# Patient Record
Sex: FEMALE | Race: OTHER | Employment: UNEMPLOYED | URBAN - METROPOLITAN AREA
[De-identification: names, ages, dates, MRNs, and addresses within clinical notes are randomized per-mention and may not be internally consistent; named-entity substitution may affect disease eponyms.]

---

## 2020-06-20 ENCOUNTER — APPOINTMENT (OUTPATIENT)
Dept: CT IMAGING | Facility: HOSPITAL | Age: 50
DRG: 552 | End: 2020-06-20
Attending: EMERGENCY MEDICINE
Payer: COMMERCIAL

## 2020-06-20 ENCOUNTER — HOSPITAL ENCOUNTER (INPATIENT)
Facility: HOSPITAL | Age: 50
LOS: 2 days | Discharge: HOME | DRG: 552 | End: 2020-06-23
Attending: EMERGENCY MEDICINE | Admitting: COLON & RECTAL SURGERY
Payer: COMMERCIAL

## 2020-06-20 DIAGNOSIS — S32.009A CLOSED FRACTURE OF TRANSVERSE PROCESS OF LUMBAR VERTEBRA, INITIAL ENCOUNTER (HCC): ICD-10-CM

## 2020-06-20 DIAGNOSIS — T14.90XA BLUNT TRAUMA: ICD-10-CM

## 2020-06-20 DIAGNOSIS — S12.600A CLOSED DISPLACED FRACTURE OF SEVENTH CERVICAL VERTEBRA, UNSPECIFIED FRACTURE MORPHOLOGY, INITIAL ENCOUNTER (HCC): ICD-10-CM

## 2020-06-20 DIAGNOSIS — S30.1XXA ABDOMINAL HEMATOMA: ICD-10-CM

## 2020-06-20 DIAGNOSIS — S12.500A CLOSED DISPLACED FRACTURE OF SIXTH CERVICAL VERTEBRA, UNSPECIFIED FRACTURE MORPHOLOGY, INITIAL ENCOUNTER (HCC): Primary | ICD-10-CM

## 2020-06-20 PROCEDURE — 99253 IP/OBS CNSLTJ NEW/EST LOW 45: CPT | Performed by: INTERNAL MEDICINE

## 2020-06-20 PROCEDURE — 70450 CT HEAD/BRAIN W/O DYE: CPT | Performed by: EMERGENCY MEDICINE

## 2020-06-20 PROCEDURE — 70498 CT ANGIOGRAPHY NECK: CPT | Performed by: EMERGENCY MEDICINE

## 2020-06-20 PROCEDURE — 74177 CT ABD & PELVIS W/CONTRAST: CPT | Performed by: EMERGENCY MEDICINE

## 2020-06-20 PROCEDURE — 72125 CT NECK SPINE W/O DYE: CPT | Performed by: EMERGENCY MEDICINE

## 2020-06-20 PROCEDURE — 99223 1ST HOSP IP/OBS HIGH 75: CPT | Performed by: COLON & RECTAL SURGERY

## 2020-06-20 PROCEDURE — 71260 CT THORAX DX C+: CPT | Performed by: EMERGENCY MEDICINE

## 2020-06-20 RX ORDER — ONDANSETRON 2 MG/ML
4 INJECTION INTRAMUSCULAR; INTRAVENOUS ONCE
Status: COMPLETED | OUTPATIENT
Start: 2020-06-20 | End: 2020-06-20

## 2020-06-20 RX ORDER — SODIUM CHLORIDE 9 MG/ML
125 INJECTION, SOLUTION INTRAVENOUS CONTINUOUS
Status: DISCONTINUED | OUTPATIENT
Start: 2020-06-20 | End: 2020-06-21

## 2020-06-20 RX ORDER — LISINOPRIL 10 MG/1
10 TABLET ORAL DAILY
COMMUNITY

## 2020-06-20 RX ORDER — MORPHINE SULFATE 4 MG/ML
INJECTION, SOLUTION INTRAMUSCULAR; INTRAVENOUS
Status: COMPLETED
Start: 2020-06-20 | End: 2020-06-20

## 2020-06-20 RX ORDER — ONDANSETRON 2 MG/ML
INJECTION INTRAMUSCULAR; INTRAVENOUS
Status: COMPLETED
Start: 2020-06-20 | End: 2020-06-20

## 2020-06-20 RX ORDER — MORPHINE SULFATE 4 MG/ML
4 INJECTION, SOLUTION INTRAMUSCULAR; INTRAVENOUS ONCE
Status: COMPLETED | OUTPATIENT
Start: 2020-06-20 | End: 2020-06-20
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
## 2020-06-21 PROBLEM — S12.500A: Status: ACTIVE | Noted: 2020-06-21

## 2020-06-21 PROBLEM — I87.1 MAY-THURNER SYNDROME: Chronic | Status: ACTIVE | Noted: 2020-06-21

## 2020-06-21 PROBLEM — S32.009A CLOSED FRACTURE OF TRANSVERSE PROCESS OF LUMBAR VERTEBRA, INITIAL ENCOUNTER (HCC): Status: ACTIVE | Noted: 2020-06-21

## 2020-06-21 PROBLEM — S30.1XXA ABDOMINAL HEMATOMA: Status: ACTIVE | Noted: 2020-06-21

## 2020-06-21 PROBLEM — T14.90XA BLUNT TRAUMA: Status: ACTIVE | Noted: 2020-06-21

## 2020-06-21 PROBLEM — I10 ESSENTIAL HYPERTENSION: Status: ACTIVE | Noted: 2020-06-21

## 2020-06-21 PROBLEM — S12.600A: Status: ACTIVE | Noted: 2020-06-21

## 2020-06-21 PROCEDURE — 99232 SBSQ HOSP IP/OBS MODERATE 35: CPT | Performed by: COLON & RECTAL SURGERY

## 2020-06-21 PROCEDURE — 99253 IP/OBS CNSLTJ NEW/EST LOW 45: CPT | Performed by: PHYSICIAN ASSISTANT

## 2020-06-21 PROCEDURE — 99232 SBSQ HOSP IP/OBS MODERATE 35: CPT | Performed by: HOSPITALIST

## 2020-06-21 RX ORDER — OXYCODONE HYDROCHLORIDE 5 MG/1
5 TABLET ORAL EVERY 6 HOURS PRN
Qty: 10 TABLET | Refills: 0 | OUTPATIENT
Start: 2020-06-21

## 2020-06-21 RX ORDER — DEXTROSE AND SODIUM CHLORIDE 5; .45 G/100ML; G/100ML
INJECTION, SOLUTION INTRAVENOUS CONTINUOUS
Status: ACTIVE | OUTPATIENT
Start: 2020-06-21 | End: 2020-06-21

## 2020-06-21 RX ORDER — METOCLOPRAMIDE HYDROCHLORIDE 5 MG/ML
10 INJECTION INTRAMUSCULAR; INTRAVENOUS EVERY 8 HOURS PRN
Status: DISCONTINUED | OUTPATIENT
Start: 2020-06-21 | End: 2020-06-23

## 2020-06-21 RX ORDER — ONDANSETRON 2 MG/ML
4 INJECTION INTRAMUSCULAR; INTRAVENOUS EVERY 6 HOURS PRN
Status: DISCONTINUED | OUTPATIENT
Start: 2020-06-21 | End: 2020-06-23

## 2020-06-21 RX ORDER — ACETAMINOPHEN 500 MG
1000 TABLET ORAL EVERY 6 HOURS PRN
Status: DISCONTINUED | OUTPATIENT
Start: 2020-06-21 | End: 2020-06-23

## 2020-06-21 RX ORDER — HYDROMORPHONE HYDROCHLORIDE 1 MG/ML
0.4 INJECTION, SOLUTION INTRAMUSCULAR; INTRAVENOUS; SUBCUTANEOUS EVERY 2 HOUR PRN
Status: DISCONTINUED | OUTPATIENT
Start: 2020-06-21 | End: 2020-06-23

## 2020-06-21 RX ORDER — HYDROCODONE BITARTRATE AND ACETAMINOPHEN 5; 325 MG/1; MG/1
1 TABLET ORAL EVERY 4 HOURS PRN
Status: DISCONTINUED | OUTPATIENT
Start: 2020-06-21 | End: 2020-06-22

## 2020-06-21 RX ORDER — ONDANSETRON 2 MG/ML
4 INJECTION INTRAMUSCULAR; INTRAVENOUS EVERY 4 HOURS PRN
Status: DISCONTINUED | OUTPATIENT
Start: 2020-06-21 | End: 2020-06-23

## 2020-06-21 RX ORDER — LISINOPRIL 10 MG/1
10 TABLET ORAL DAILY
Status: DISCONTINUED | OUTPATIENT
Start: 2020-06-21 | End: 2020-06-23

## 2020-06-21 RX ORDER — NALOXONE HYDROCHLORIDE 4 MG/.1ML
4 SPRAY, METERED NASAL AS NEEDED
Qty: 1 KIT | Refills: 0 | OUTPATIENT
Start: 2020-06-21

## 2020-06-21 RX ORDER — SODIUM CHLORIDE 9 MG/ML
INJECTION, SOLUTION INTRAVENOUS CONTINUOUS
Status: DISCONTINUED | OUTPATIENT
Start: 2020-06-21 | End: 2020-06-23

## 2020-06-21 RX ORDER — HYDROMORPHONE HYDROCHLORIDE 1 MG/ML
0.2 INJECTION, SOLUTION INTRAMUSCULAR; INTRAVENOUS; SUBCUTANEOUS EVERY 2 HOUR PRN
Status: DISCONTINUED | OUTPATIENT
Start: 2020-06-21 | End: 2020-06-23

## 2020-06-21 RX ORDER — HYDROMORPHONE HYDROCHLORIDE 1 MG/ML
0.8 INJECTION, SOLUTION INTRAMUSCULAR; INTRAVENOUS; SUBCUTANEOUS EVERY 2 HOUR PRN
Status: DISCONTINUED | OUTPATIENT
Start: 2020-06-21 | End: 2020-06-23

## 2020-06-21 RX ORDER — MORPHINE SULFATE 4 MG/ML
4 INJECTION, SOLUTION INTRAMUSCULAR; INTRAVENOUS EVERY 30 MIN PRN
Status: ACTIVE | OUTPATIENT
Start: 2020-06-21 | End: 2020-06-21
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
## 2020-06-21 NOTE — PLAN OF CARE
Plan of care explained and updated with patient input. Progressing per plan of care. All information obtained through the use of an . Patient can speak some [place], but primary language is [address]  Patient is alert and oriented to person, place

## 2020-06-21 NOTE — CONSULTS
[facility]  Report of Consultation    Gino Chang Patient Status:  Emergency    3/15/1970 MRN [id number]   Location [address] [facility] Attending Pancho Monaco, [address] [facility] Day # 0 PCP PHYSICIAN NONSTAFF     Reason for Consultation: Family History   Problem Relation Age of Onset   • Hypertension Mother      Social History:   reports that she has never smoked. She has never used smokeless tobacco. She reports that she does not drink alcohol or use drugs.     Allergies:  No Known All ALT 32 06/20/2020    PTT 26.2 06/20/2020    INR 1.05 06/20/2020    PTP 14.0 06/20/2020       Imaging:   CT SPINE CERVICAL (CPT=72125)     COMPARISON:  [facility] , CT, CT CHEST+ABDOMEN+PELVIS(ALL CNTRST ONLY)(CPT=71260/01397), 6/20/2020, 8:36 PM.     Linsey Amezquita line just anterior to the vertebral artery canal of C6. Critical findings discussed with Dr. Maxine Gloria at 2100 hours on 6/20/2020. Dictated by: Shamika Yeh MD on 6/20/2020 at 8:57 PM         CT BRAIN OR HEAD ([id number])     COMPARISON:  None.      SERGIO significant calcification. PLEURA:  No mass or effusion. CHEST WALL:  No mass or axillary adenopathy. AORTA:  No aneurysm or dissection. VASCULATURE:  No visible pulmonary arterial thrombus or attenuation.        ABDOMEN/PELVIS:  LIVER:  No enlarg gutter without active extravasation. 3. No significant injury to the chest.     4. Thrombosed left common iliac vein stent and partial thrombosis of the left superficial femoral vein stent. Patient has a IVC filter.      Critical findings discussed w hemodynamically significant stenosis or dissection. Smaller than the contralateral side which is developmental.     LEFT  SUBCLAVIAN ARTERY:    No hemodynamically significant stenosis or dissection.   COMMON CAROTID:    No hemodynamically significant steno fracture and hematoma in the left paracolic gutter.   Patient understands the risk of increasing DVT and clot while off anticoagulation, also understands the risk of bleeding with the current spinal fracture and hematoma with the anticoagulation and agreeab
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
## 2020-06-21 NOTE — H&P
[facility]  Report of Consultation    Sha King Patient Status:  Emergency    3/15/1970 MRN [id number]   Location [address] [facility] Attending Robert Parnell, [facility] Day # 0 PCP PHYSICIAN NONSTAFF       Chief Complaint:  Neck swallowing. Respiratory: Negative for shortness of breath and wheezing. Cardiovascular: Negative for chest pain and palpitations. Gastrointestinal: Positive for vomiting and abdominal pain. Negative for nausea, diarrhea and constipation.    Endocrin .0       Recent Labs   Lab 06/20/20  2116   *   BUN 11   CREATSERUM 0.93   GFRAA 82   GFRNAA 71   CA 8.7   ALB 3.7      K 3.4*      CO2 25.0   ALKPHO 67   AST 39*   ALT 32   BILT 0.5   TP 7.6         Recent Labs   Lab 06/20/20 pericolic gutter measuring 32 x 13 mm transversely and approximately 5 cm in caudal cranial dimension could represent a small hematoma. There is no active extravasation. No visible mass, obstruction, or bowel wall thickening. Small hiatal hernia.   ABD fluid collections. There is no midline shift. There are no intraparenchymal brain abnormalities. There is nothing specific for acute infarct. There is no hemorrhage or mass lesion. SINUSES:           No sign of acute sinusitis.   Mild mucosal thicken disc/facet abnormality, spinal stenosis, or foraminal stenosis. C4-C5:  No significant disc/facet abnormality, spinal stenosis, or foraminal stenosis. C5-C6:  No significant disc/facet abnormality, spinal stenosis, or foraminal stenosis.   C6-C7:  No sign
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
## 2020-06-21 NOTE — PLAN OF CARE
Patient admitted to 371 via cart from emergency department. Accompanied by transporter and ER nurse. Transferred from cart to bed with slideboard and assistance of 4. Oriented to room and safety protocols. All care explained with the use of a translater.  V

## 2020-06-21 NOTE — PLAN OF CARE
Pt in bed. Has been on bedrest since admission. RA, VSS. Dr. Gerri Poon saw this am, ok/stable to ambulate with PT/OT, [place] collar on at all times. [place]ar[place]uan speaking, family at bedside. Was NPO, non-surgical per neurosurgery, will add regular diet.  Eligio Talbot i
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
## 2020-06-21 NOTE — CONSULTS
[facility]  Neurosurgery Consult    Virgilio Schmitz Patient Status:  Inpatient    3/15/1970 MRN [id number]   [facility] 3SW-A Attending Amanda Cai MD   Hosp Day # 0 PCP PHYSICIAN NONSTAFF     REASON FOR CONSULTATION:  Multiple injection 0.4 mg, 0.4 mg, Intravenous, Q2H PRN    Or  HYDROmorphone HCl (DILAUDID) 1 MG/ML injection 0.8 mg, 0.8 mg, Intravenous, Q2H PRN  ondansetron HCl (ZOFRAN) injection 4 mg, 4 mg, Intravenous, Q6H PRN  Metoclopramide HCl (REGLAN) injection 10 mg, 10 arteries. CT Brain -     No acute intracranial process. CT CAP -    1. Nondisplaced acute right transverse process fractures of L1 and L2.     2. Age indeterminate hematoma within the left pericolic gutter without active extravasation.        3. No si
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
## 2020-06-22 PROCEDURE — 99232 SBSQ HOSP IP/OBS MODERATE 35: CPT | Performed by: HOSPITALIST

## 2020-06-22 RX ORDER — CYCLOBENZAPRINE HCL 10 MG
10 TABLET ORAL 3 TIMES DAILY PRN
Status: DISCONTINUED | OUTPATIENT
Start: 2020-06-22 | End: 2020-06-23

## 2020-06-22 RX ORDER — HYDROCODONE BITARTRATE AND ACETAMINOPHEN 10; 325 MG/1; MG/1
1 TABLET ORAL EVERY 4 HOURS PRN
Status: DISCONTINUED | OUTPATIENT
Start: 2020-06-22 | End: 2020-06-23

## 2020-06-22 RX ORDER — HYDROCODONE BITARTRATE AND ACETAMINOPHEN 10; 325 MG/1; MG/1
2 TABLET ORAL EVERY 4 HOURS PRN
Status: DISCONTINUED | OUTPATIENT
Start: 2020-06-22 | End: 2020-06-23
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
## 2020-06-22 NOTE — PROGRESS NOTES
[facility]  Progress Note    Padminimattheidi Mancini Patient Status:  Inpatient    3/15/1970 MRN [id number]   [facility] 3SW-A Attending Matt Renee MD   Hosp Day # 1 PCP PHYSICIAN NONSTAFF     Subjective:  Patient continues to complain minutes face to face with the patient. More than 50% of that time was spent counseling the patient and/or on coordination of care. The diagnosis, prognosis, and general treatment was explained to the patient and the family.      Gabriela Reyes PA-C  [facility]
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
## 2020-06-22 NOTE — HOME CARE LIAISON
TNL ask supervisor if [facility] would accept this [place] referral on dc. [facility] will not be able to provide services to ptnt on dc. Janet WHITFIELD and Monalisa De Jesus CM  Aware.     Thanks  Kamlesh Cruz
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
## 2020-06-22 NOTE — CM/SW NOTE
06/22/20 1100   CM/SW Referral Data   Referral Source Social Work (self-referral)   Reason for Referral Discharge planning;Psychoscial assessment   Informant Patient; Other  (family member, Bailey Johnson)      Method of Interpretation Family Member ov needs/concerns at this time. / to remain available for support and/or discharge planning.      Triny Klein, [facility]  Discharge Planner  [phone]

## 2020-06-22 NOTE — PLAN OF CARE
Patient is alert and oriented x4, complains of moderate to severe pain to her neck and back, Pain medications were adjusted today. Aspen collar kept on at all time, the abrasion on her neck line was cleaned with NS, bacitracin ointment and gauze applied.
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
## 2020-06-22 NOTE — PROGRESS NOTES
[facility]  Neurosurgery Progress Note    Crystal Smith Patient Status:  Inpatient    3/15/1970 MRN [id number]   [facility] 3SW-A Attending Malka Valdez MD   Hosp Day # 1 PCP PHYSICIAN NONSTAFF     Subjective:  Pt seen and exam [id number] Brenda White for d/c when cleared.      Gilberto Calvo MD  Neurological Surgeon  [facility]  [address], [address], [address] [facility]

## 2020-06-23 VITALS
SYSTOLIC BLOOD PRESSURE: 132 MMHG | OXYGEN SATURATION: 96 % | BODY MASS INDEX: 34.74 KG/M2 | HEART RATE: 74 BPM | RESPIRATION RATE: 18 BRPM | WEIGHT: 216.19 LBS | HEIGHT: 66 IN | TEMPERATURE: 98 F | DIASTOLIC BLOOD PRESSURE: 87 MMHG

## 2020-06-23 PROCEDURE — 99232 SBSQ HOSP IP/OBS MODERATE 35: CPT | Performed by: COLON & RECTAL SURGERY

## 2020-06-23 PROCEDURE — 99232 SBSQ HOSP IP/OBS MODERATE 35: CPT | Performed by: HOSPITALIST

## 2020-06-23 RX ORDER — CYCLOBENZAPRINE HCL 10 MG
10 TABLET ORAL 3 TIMES DAILY PRN
Qty: 90 TABLET | Refills: 0 | Status: SHIPPED | OUTPATIENT
Start: 2020-06-23

## 2020-06-23 RX ORDER — HYDROCODONE BITARTRATE AND ACETAMINOPHEN 10; 325 MG/1; MG/1
1 TABLET ORAL EVERY 4 HOURS PRN
Qty: 30 TABLET | Refills: 0 | Status: SHIPPED | OUTPATIENT
Start: 2020-06-23

## 2020-06-23 RX ORDER — POLYETHYLENE GLYCOL 3350 17 G/17G
17 POWDER, FOR SOLUTION ORAL DAILY PRN
Status: DISCONTINUED | OUTPATIENT
Start: 2020-06-23 | End: 2020-06-23

## 2020-06-23 RX ORDER — DOCUSATE SODIUM 100 MG/1
100 CAPSULE, LIQUID FILLED ORAL 2 TIMES DAILY
Status: DISCONTINUED | OUTPATIENT
Start: 2020-06-23 | End: 2020-06-23
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
## 2020-06-23 NOTE — PROGRESS NOTES
[facility]  Progress Note    Dukenorma Alanis Patient Status:  Inpatient    3/15/1970 MRN [id number]   [facility] 3SW-A Attending Chadd Kaplan MD   Hosp Day # 2 PCP PHYSICIAN NONSTAFF     Subjective:  Patient was able to bathe her 30  minutes face to face with the patient. More than 50% of that time was spent counseling the patient and/or on coordination of care. The diagnosis, prognosis, and general treatment was explained to the patient and the family.      Danis Roper MD  [facility]

## 2020-06-23 NOTE — PLAN OF CARE
RN informed pt that there is a discharge order in place, hospitalist informed pt that she would be d/c'ing tonight as well. Pt states that \"she wants to go to [facility] [place]". SW accompanied RN to room so pt could explain this.  Pt informed that without

## 2020-06-23 NOTE — PLAN OF CARE
D/c paperwork given, family friend instructed on paperwork and where to  scripts. Will d/c home via personal car.
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
## 2020-06-23 NOTE — PLAN OF CARE
Pt AOX4. Has moderate pain on her neck. Aspen collar ON at all times. Pain management plan explained.  was used during assessment. All questions were answered. Slightly anxious d/t pain. Tolerating norco and Flexeril PRN.  Up with min ass
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
## 2020-07-08 ENCOUNTER — OFFICE VISIT (OUTPATIENT)
Dept: SURGERY | Facility: CLINIC | Age: 50
End: 2020-07-08
Payer: COMMERCIAL

## 2020-07-08 ENCOUNTER — TELEPHONE (OUTPATIENT)
Dept: NEUROLOGY | Facility: CLINIC | Age: 50
End: 2020-07-08

## 2020-07-08 DIAGNOSIS — S12.500A CLOSED DISPLACED FRACTURE OF SIXTH CERVICAL VERTEBRA, UNSPECIFIED FRACTURE MORPHOLOGY, INITIAL ENCOUNTER (HCC): ICD-10-CM

## 2020-07-08 DIAGNOSIS — S32.009A CLOSED FRACTURE OF TRANSVERSE PROCESS OF LUMBAR VERTEBRA, INITIAL ENCOUNTER (HCC): ICD-10-CM

## 2020-07-08 DIAGNOSIS — S12.600A CLOSED DISPLACED FRACTURE OF SEVENTH CERVICAL VERTEBRA, UNSPECIFIED FRACTURE MORPHOLOGY, INITIAL ENCOUNTER (HCC): Primary | ICD-10-CM

## 2020-07-08 PROCEDURE — 1111F DSCHRG MED/CURRENT MED MERGE: CPT | Performed by: PHYSICIAN ASSISTANT

## 2020-07-08 PROCEDURE — 99214 OFFICE O/P EST MOD 30 MIN: CPT | Performed by: PHYSICIAN ASSISTANT
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
## 2020-08-05 ENCOUNTER — OFFICE VISIT (OUTPATIENT)
Dept: SURGERY | Facility: CLINIC | Age: 50
End: 2020-08-05
Payer: COMMERCIAL

## 2020-08-05 ENCOUNTER — TELEPHONE (OUTPATIENT)
Dept: SURGERY | Facility: CLINIC | Age: 50
End: 2020-08-05

## 2020-08-05 VITALS — HEART RATE: 72 BPM | SYSTOLIC BLOOD PRESSURE: 128 MMHG | DIASTOLIC BLOOD PRESSURE: 82 MMHG

## 2020-08-05 DIAGNOSIS — S32.009A CLOSED FRACTURE OF TRANSVERSE PROCESS OF LUMBAR VERTEBRA, INITIAL ENCOUNTER (HCC): ICD-10-CM

## 2020-08-05 DIAGNOSIS — M54.81 OCCIPITAL NEURALGIA OF LEFT SIDE: ICD-10-CM

## 2020-08-05 DIAGNOSIS — S12.600A CLOSED DISPLACED FRACTURE OF SEVENTH CERVICAL VERTEBRA, UNSPECIFIED FRACTURE MORPHOLOGY, INITIAL ENCOUNTER (HCC): ICD-10-CM

## 2020-08-05 DIAGNOSIS — S12.500A CLOSED DISPLACED FRACTURE OF SIXTH CERVICAL VERTEBRA, UNSPECIFIED FRACTURE MORPHOLOGY, INITIAL ENCOUNTER (HCC): ICD-10-CM
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
## 2020-08-05 DIAGNOSIS — M54.2 NECK PAIN ON LEFT SIDE: Primary | ICD-10-CM

## 2020-08-05 DIAGNOSIS — M25.512 ACUTE PAIN OF LEFT SHOULDER: ICD-10-CM

## 2020-08-05 PROCEDURE — 99214 OFFICE O/P EST MOD 30 MIN: CPT | Performed by: PHYSICIAN ASSISTANT

## 2020-08-05 PROCEDURE — 3074F SYST BP LT 130 MM HG: CPT | Performed by: PHYSICIAN ASSISTANT

## 2020-08-05 PROCEDURE — 3079F DIAST BP 80-89 MM HG: CPT | Performed by: PHYSICIAN ASSISTANT
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
## 2020-08-06 ENCOUNTER — TELEPHONE (OUTPATIENT)
Dept: ORTHOPEDICS CLINIC | Facility: CLINIC | Age: 50
End: 2020-08-06

## 2020-08-06 DIAGNOSIS — M25.512 LEFT SHOULDER PAIN, UNSPECIFIED CHRONICITY: Primary | ICD-10-CM

## 2020-08-14 ENCOUNTER — TELEPHONE (OUTPATIENT)
Dept: PAIN CLINIC | Facility: CLINIC | Age: 50
End: 2020-08-14

## 2020-08-14 ENCOUNTER — OFFICE VISIT (OUTPATIENT)
Dept: PAIN CLINIC | Facility: CLINIC | Age: 50
End: 2020-08-14
Payer: COMMERCIAL

## 2020-08-14 VITALS — HEIGHT: 66.14 IN | BODY MASS INDEX: 33.75 KG/M2 | WEIGHT: 210 LBS
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
## 2020-08-14 DIAGNOSIS — M54.16 LUMBAR RADICULOPATHY: ICD-10-CM

## 2020-08-14 DIAGNOSIS — S32.009A CLOSED FRACTURE OF TRANSVERSE PROCESS OF LUMBAR VERTEBRA, INITIAL ENCOUNTER (HCC): ICD-10-CM

## 2020-08-14 DIAGNOSIS — S12.500A CLOSED DISPLACED FRACTURE OF SIXTH CERVICAL VERTEBRA, UNSPECIFIED FRACTURE MORPHOLOGY, INITIAL ENCOUNTER (HCC): ICD-10-CM

## 2020-08-14 DIAGNOSIS — S06.0X0D CONCUSSION WITHOUT LOSS OF CONSCIOUSNESS, SUBSEQUENT ENCOUNTER: Primary | ICD-10-CM

## 2020-08-14 PROCEDURE — 3008F BODY MASS INDEX DOCD: CPT | Performed by: ANESTHESIOLOGY

## 2020-08-14 PROCEDURE — 99204 OFFICE O/P NEW MOD 45 MIN: CPT | Performed by: ANESTHESIOLOGY

## 2020-08-14 NOTE — PROGRESS NOTES
Authorization required for recommended procedure. Patient left without receiving pre-procedure instructions.
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
## 2020-08-14 NOTE — PROGRESS NOTES
PHYSICAL EXAM:   Physical Exam   Constitutional:           Patient presents in office today with reported pain in neck, shoulders, right side of lower back, right buttocks, right leg    Current pain level reported = 3/10    Last reported dose of HYDROcod

## 2020-08-17 ENCOUNTER — HOSPITAL ENCOUNTER (OUTPATIENT)
Dept: GENERAL RADIOLOGY | Facility: HOSPITAL | Age: 50
Discharge: HOME | End: 2020-08-17
Attending: NURSE PRACTITIONER
Payer: COMMERCIAL

## 2020-08-17 DIAGNOSIS — M25.512 LEFT SHOULDER PAIN, UNSPECIFIED CHRONICITY: ICD-10-CM

## 2020-08-17 PROCEDURE — 73030 X-RAY EXAM OF SHOULDER: CPT | Performed by: NURSE PRACTITIONER
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
## 2020-08-25 ENCOUNTER — HOSPITAL ENCOUNTER (OUTPATIENT)
Dept: MRI IMAGING | Facility: HOSPITAL | Age: 50
Discharge: HOME | End: 2020-08-25
Attending: ANESTHESIOLOGY
Payer: COMMERCIAL

## 2020-08-25 DIAGNOSIS — M54.16 LUMBAR RADICULOPATHY: ICD-10-CM

## 2020-08-25 PROCEDURE — 72148 MRI LUMBAR SPINE W/O DYE: CPT | Performed by: ANESTHESIOLOGY

## 2020-08-27 ENCOUNTER — TELEPHONE (OUTPATIENT)
Dept: PAIN CLINIC | Facility: CLINIC | Age: 50
End: 2020-08-27

## 2020-08-27 DIAGNOSIS — M54.16 LUMBAR RADICULOPATHY: Primary | ICD-10-CM

## 2020-08-27 NOTE — TELEPHONE ENCOUNTER
Cases placed for 3 right TLESI's as recommended by Dr. Inés Pelletier. Patient does not have insurance, please forward as appropriate so that she can get information regarding cost of injections. Thanks!

## 2020-08-28 NOTE — TELEPHONE ENCOUNTER
Attempted to reach patient to provide information below through LUCY Cordova. Name of  - Mike Sam  ID [id number]       Patient was informed of below . Provided hospital Billing information to obtain out of pocket cost and payment.  Advised patient

## 2020-09-02 ENCOUNTER — OFFICE VISIT (OUTPATIENT)
Dept: SURGERY | Facility: CLINIC | Age: 50
End: 2020-09-02
Payer: COMMERCIAL

## 2020-09-02 VITALS — HEART RATE: 70 BPM | SYSTOLIC BLOOD PRESSURE: 130 MMHG | DIASTOLIC BLOOD PRESSURE: 72 MMHG

## 2020-09-02 DIAGNOSIS — M54.2 CERVICALGIA: Primary | ICD-10-CM
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
## 2020-09-02 PROCEDURE — 3078F DIAST BP <80 MM HG: CPT | Performed by: PHYSICIAN ASSISTANT

## 2020-09-02 PROCEDURE — 99212 OFFICE O/P EST SF 10 MIN: CPT | Performed by: PHYSICIAN ASSISTANT

## 2020-09-02 PROCEDURE — 3075F SYST BP GE 130 - 139MM HG: CPT | Performed by: PHYSICIAN ASSISTANT
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
## 2020-09-15 ENCOUNTER — OFFICE VISIT (OUTPATIENT)
Dept: PAIN CLINIC | Facility: CLINIC | Age: 50
End: 2020-09-15
Payer: COMMERCIAL

## 2020-09-15 DIAGNOSIS — M54.2 NECK PAIN: Primary | ICD-10-CM

## 2020-09-15 PROCEDURE — 97810 ACUP 1/> WO ESTIM 1ST 15 MIN: CPT | Performed by: ACUPUNCTURIST

## 2020-09-15 PROCEDURE — 97811 ACUP 1/> W/O ESTIM EA ADD 15: CPT | Performed by: ACUPUNCTURIST

## 2020-09-15 NOTE — PROGRESS NOTES
Melisa Pedrozavin Shone is a 44year old female Acupuncture Therapy. Complaints:  1. Neck pain but more pronoucced on the left side 6-8/10  2. Low back that extends down both legs but more pronounced on the left 6-7/10  3. Frontal HA 5-6/10  4.  Trouble

## 2020-09-23 ENCOUNTER — OFFICE VISIT (OUTPATIENT)
Dept: PAIN CLINIC | Facility: CLINIC | Age: 50
End: 2020-09-23
Payer: COMMERCIAL

## 2020-09-23 DIAGNOSIS — M54.2 NECK PAIN: ICD-10-CM

## 2020-09-23 PROCEDURE — 97810 ACUP 1/> WO ESTIM 1ST 15 MIN: CPT | Performed by: ACUPUNCTURIST

## 2020-09-23 PROCEDURE — 97811 ACUP 1/> W/O ESTIM EA ADD 15: CPT | Performed by: ACUPUNCTURIST

## 2020-09-23 NOTE — PROGRESS NOTES
Melisa Maldonado is a 44year old female Acupuncture Therapy. Complaints:  1. Neck pain but more pronoucced on the left side 6-8/10  2. Low back that extends down both legs but more pronounced on the left 6-7/10  3. Frontal HA 5-6/10  4.  Trouble

## 2020-09-30 ENCOUNTER — OFFICE VISIT (OUTPATIENT)
Dept: PAIN CLINIC | Facility: CLINIC | Age: 50
End: 2020-09-30
Payer: COMMERCIAL

## 2020-09-30 DIAGNOSIS — M54.2 NECK PAIN: ICD-10-CM

## 2020-09-30 PROCEDURE — 97811 ACUP 1/> W/O ESTIM EA ADD 15: CPT | Performed by: ACUPUNCTURIST

## 2020-09-30 PROCEDURE — 97810 ACUP 1/> WO ESTIM 1ST 15 MIN: CPT | Performed by: ACUPUNCTURIST

## 2020-09-30 NOTE — PROGRESS NOTES
Melisa Boldencarlos Solomon is a 44year old female Acupuncture Therapy. Complaints:  1. Neck pain but more pronoucced on the left side 6-8/10  2. Low back that extends down both legs but more pronounced on the left 6-7/10  3. Frontal HA 5-6/10  4.  Trouble

## 2020-10-07 ENCOUNTER — OFFICE VISIT (OUTPATIENT)
Dept: PAIN CLINIC | Facility: CLINIC | Age: 50
End: 2020-10-07
Payer: COMMERCIAL

## 2020-10-07 DIAGNOSIS — M54.2 NECK PAIN: ICD-10-CM

## 2020-10-07 PROCEDURE — 97810 ACUP 1/> WO ESTIM 1ST 15 MIN: CPT | Performed by: ACUPUNCTURIST

## 2020-10-07 PROCEDURE — 97811 ACUP 1/> W/O ESTIM EA ADD 15: CPT | Performed by: ACUPUNCTURIST

## 2020-10-07 NOTE — PROGRESS NOTES
Melisa Winters is a 44year old female Acupuncture Therapy. Complaints:  1. Neck pain but more pronoucced on the left side 6-8/10  2. Low back that extends down both legs but more pronounced on the left 6-7/10  3. Frontal HA 5-6/10  4.  Trouble

## 2020-10-21 ENCOUNTER — OFFICE VISIT (OUTPATIENT)
Dept: PAIN CLINIC | Facility: CLINIC | Age: 50
End: 2020-10-21
Payer: COMMERCIAL

## 2020-10-21 DIAGNOSIS — M54.2 NECK PAIN: ICD-10-CM

## 2020-10-21 PROCEDURE — 97811 ACUP 1/> W/O ESTIM EA ADD 15: CPT | Performed by: ACUPUNCTURIST

## 2020-10-21 PROCEDURE — 97810 ACUP 1/> WO ESTIM 1ST 15 MIN: CPT | Performed by: ACUPUNCTURIST

## 2020-10-21 NOTE — PROGRESS NOTES
Melisa Coates Yeni Oar is a 44year old female Acupuncture Therapy. Complaints:  1. Neck pain but more pronoucced on the left side 6-8/10  2. Low back that extends down both legs but more pronounced on the left 6-7/10  3. Frontal HA 5-6/10  4.  Trouble file.    LULU Roach.

## 2020-10-28 ENCOUNTER — OFFICE VISIT (OUTPATIENT)
Dept: PAIN CLINIC | Facility: CLINIC | Age: 50
End: 2020-10-28
Payer: COMMERCIAL

## 2020-10-28 DIAGNOSIS — M54.2 NECK PAIN: ICD-10-CM

## 2020-10-28 PROCEDURE — 97811 ACUP 1/> W/O ESTIM EA ADD 15: CPT | Performed by: ACUPUNCTURIST

## 2020-10-28 PROCEDURE — 97810 ACUP 1/> WO ESTIM 1ST 15 MIN: CPT | Performed by: ACUPUNCTURIST

## 2020-10-28 NOTE — PROGRESS NOTES
Melisa Patel Katherin is a 44year old female Acupuncture Therapy. Complaints:  1. Neck pain but more pronoucced on the left side 6-8/10  2. Low back that extends down both legs but more pronounced on the left 6-7/10  3. Frontal HA 5-6/10  4.  Trouble Anirudh Humphrey

## 2020-11-06 ENCOUNTER — TELEPHONE (OUTPATIENT)
Dept: SURGERY | Facility: CLINIC | Age: 50
End: 2020-11-06

## 2020-11-11 ENCOUNTER — OFFICE VISIT (OUTPATIENT)
Dept: PAIN CLINIC | Facility: CLINIC | Age: 50
End: 2020-11-11
Payer: COMMERCIAL

## 2020-11-11 DIAGNOSIS — M54.2 NECK PAIN: Primary | ICD-10-CM

## 2020-11-11 PROCEDURE — 97811 ACUP 1/> W/O ESTIM EA ADD 15: CPT | Performed by: ACUPUNCTURIST

## 2020-11-11 PROCEDURE — 97810 ACUP 1/> WO ESTIM 1ST 15 MIN: CPT | Performed by: ACUPUNCTURIST

## 2020-11-11 NOTE — PROGRESS NOTES
Melisa Coates Bj Bachelor is a 44year old female Acupuncture Therapy. Complaints:  1. Neck pain but more pronoucced on the left side 6-8/10  2. Low back that extends down both legs but more pronounced on the left 6-7/10  3. Frontal HA 5-6/10  4.  Trouble performed by Agnes RUSH Lac. at [place] [address]. No follow-ups on file. LULU Bolton.

## 2020-11-17 ENCOUNTER — OFFICE VISIT (OUTPATIENT)
Dept: PAIN CLINIC | Facility: CLINIC | Age: 50
End: 2020-11-17
Payer: COMMERCIAL

## 2020-11-17 DIAGNOSIS — M54.2 NECK PAIN: Primary | ICD-10-CM

## 2020-11-17 PROCEDURE — 97811 ACUP 1/> W/O ESTIM EA ADD 15: CPT | Performed by: ACUPUNCTURIST

## 2020-11-17 PROCEDURE — 97810 ACUP 1/> WO ESTIM 1ST 15 MIN: CPT | Performed by: ACUPUNCTURIST

## 2020-11-17 NOTE — PROGRESS NOTES
Melisa Coates Bj Bachelor is a 44year old female Acupuncture Therapy. Complaints:  1. Neck pain but more pronoucced on the left side 6-8/10  2. Low back that extends down both legs but more pronounced on the left 6-7/10  3. Frontal HA 5-6/10  4.  Trouble follow-ups on file.     Quentin Damian, [facility]  11/17/2020  10:44 AM

## 2020-11-18 ENCOUNTER — OFFICE VISIT (OUTPATIENT)
Dept: PAIN CLINIC | Facility: CLINIC | Age: 50
End: 2020-11-18
Payer: COMMERCIAL

## 2020-11-18 DIAGNOSIS — M54.2 NECK PAIN: ICD-10-CM

## 2020-11-18 PROCEDURE — 97811 ACUP 1/> W/O ESTIM EA ADD 15: CPT | Performed by: ACUPUNCTURIST

## 2020-11-18 PROCEDURE — 97810 ACUP 1/> WO ESTIM 1ST 15 MIN: CPT | Performed by: ACUPUNCTURIST

## 2020-11-18 NOTE — PROGRESS NOTES
Melisa Day Phoenix is a 44year old female Acupuncture Therapy. Complaints:  1. Neck pain but more pronoucced on the left side 6-8/10  2. Low back that extends down both legs but more pronounced on the left 6-7/10  3. Frontal HA 5-6/10  4.  Trouble on file. LULU Francois AC

## 2020-11-24 ENCOUNTER — OFFICE VISIT (OUTPATIENT)
Dept: PAIN CLINIC | Facility: CLINIC | Age: 50
End: 2020-11-24
Payer: COMMERCIAL

## 2020-11-24 DIAGNOSIS — M54.2 NECK PAIN: Primary | ICD-10-CM

## 2020-11-24 PROCEDURE — 97811 ACUP 1/> W/O ESTIM EA ADD 15: CPT | Performed by: ACUPUNCTURIST

## 2020-11-24 PROCEDURE — 97810 ACUP 1/> WO ESTIM 1ST 15 MIN: CPT | Performed by: ACUPUNCTURIST

## 2020-11-24 NOTE — PROGRESS NOTES
Melisa Aminata Adelfovin Shone is a 44year old female Acupuncture Therapy. Complaints:  1. Neck pain but more pronoucced on the left side 6-8/10  2. Frontal HA 5-6/10  3. Low back that extends down both legs but more pronounced on the left 4/10  4.  Trouble s Health. No follow-ups on file.     Quentin Damian, [facility]  11/24/2020  11:07 AM

## 2020-12-01 ENCOUNTER — OFFICE VISIT (OUTPATIENT)
Dept: PAIN CLINIC | Facility: CLINIC | Age: 50
End: 2020-12-01
Payer: COMMERCIAL

## 2020-12-01 VITALS — SYSTOLIC BLOOD PRESSURE: 110 MMHG | DIASTOLIC BLOOD PRESSURE: 72 MMHG

## 2020-12-01 DIAGNOSIS — R51.9 HEADACHE DISORDER: Primary | ICD-10-CM

## 2020-12-01 PROCEDURE — 3074F SYST BP LT 130 MM HG: CPT | Performed by: ACUPUNCTURIST

## 2020-12-01 PROCEDURE — 97810 ACUP 1/> WO ESTIM 1ST 15 MIN: CPT | Performed by: ACUPUNCTURIST

## 2020-12-01 PROCEDURE — 3078F DIAST BP <80 MM HG: CPT | Performed by: ACUPUNCTURIST

## 2020-12-01 PROCEDURE — 97811 ACUP 1/> W/O ESTIM EA ADD 15: CPT | Performed by: ACUPUNCTURIST

## 2020-12-01 NOTE — PROGRESS NOTES
Melisa Reagan is a 44year old female Acupuncture Therapy. Complaints:  1. Frontal HA 0-6/10  2. Neck pain but more pronoucced on the left side 6-8/10  3. Low back that extends down both legs but more pronounced on the left 4/10  4.  Trouble s PATRICIA  12/1/2020  11:11 AM

## 2020-12-07 ENCOUNTER — TELEPHONE (OUTPATIENT)
Dept: SURGERY | Facility: CLINIC | Age: 50
End: 2020-12-07

## 2020-12-07 NOTE — TELEPHONE ENCOUNTER
pt states that after acupuncture she has a lot of bloating and swelling in the head with pus where needles were placed

## 2020-12-08 NOTE — TELEPHONE ENCOUNTER
LULU BrodyAC  You 15 hours ago (5:34 PM)     Rd Brito,   This is a very unusual response.  There should not be pus around the needle site.  I suggest that she follow up with urgent care to make sure that there is not an infection.  The needles w

## 2021-06-07 ENCOUNTER — OFFICE VISIT (OUTPATIENT)
Dept: SURGERY | Facility: CLINIC | Age: 51
End: 2021-06-07
Payer: COMMERCIAL

## 2021-06-07 ENCOUNTER — TELEPHONE (OUTPATIENT)
Dept: ORTHOPEDICS CLINIC | Facility: CLINIC | Age: 51
End: 2021-06-07

## 2021-06-07 VITALS
DIASTOLIC BLOOD PRESSURE: 82 MMHG | WEIGHT: 210 LBS | BODY MASS INDEX: 33.75 KG/M2 | SYSTOLIC BLOOD PRESSURE: 120 MMHG | HEIGHT: 66 IN

## 2021-06-07 DIAGNOSIS — M25.512 CHRONIC LEFT SHOULDER PAIN: Primary | ICD-10-CM
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
## 2021-06-07 DIAGNOSIS — G89.29 CHRONIC LEFT SHOULDER PAIN: Primary | ICD-10-CM

## 2021-06-07 PROCEDURE — 3008F BODY MASS INDEX DOCD: CPT | Performed by: PHYSICIAN ASSISTANT

## 2021-06-07 PROCEDURE — 99214 OFFICE O/P EST MOD 30 MIN: CPT | Performed by: PHYSICIAN ASSISTANT

## 2021-06-07 PROCEDURE — 3079F DIAST BP 80-89 MM HG: CPT | Performed by: PHYSICIAN ASSISTANT

## 2021-06-07 PROCEDURE — 3074F SYST BP LT 130 MM HG: CPT | Performed by: PHYSICIAN ASSISTANT

## 2021-06-07 RX ORDER — LISINOPRIL 20 MG/1
TABLET ORAL
COMMUNITY
Start: 2021-06-02 | End: 2021-08-19
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
## 2021-06-07 NOTE — TELEPHONE ENCOUNTER
Patient coming in for Left shoulder pain. Patient had x-ray's done back in August 2020. Imaging can be viewed in [facility]. If additional imaging needed please place rx.   Future Appointments   Date Time Provider Kendall Villagran   7/1/2021  9:40 AM Jesusita Rodriguez

## 2021-07-01 ENCOUNTER — OFFICE VISIT (OUTPATIENT)
Dept: ORTHOPEDICS CLINIC | Facility: CLINIC | Age: 51
End: 2021-07-01
Payer: COMMERCIAL

## 2021-07-01 DIAGNOSIS — M75.02 ADHESIVE CAPSULITIS OF LEFT SHOULDER: Primary | ICD-10-CM

## 2021-07-01 PROCEDURE — 99243 OFF/OP CNSLTJ NEW/EST LOW 30: CPT | Performed by: ORTHOPAEDIC SURGERY

## 2021-07-01 PROCEDURE — 20610 DRAIN/INJ JOINT/BURSA W/O US: CPT | Performed by: ORTHOPAEDIC SURGERY

## 2021-07-01 RX ORDER — TRIAMCINOLONE ACETONIDE 40 MG/ML
40 INJECTION, SUSPENSION INTRA-ARTICULAR; INTRAMUSCULAR ONCE
Status: COMPLETED | OUTPATIENT
Start: 2021-07-01 | End: 2021-07-01

## 2021-07-01 RX ADMIN — TRIAMCINOLONE ACETONIDE 40 MG: 40 INJECTION, SUSPENSION INTRA-ARTICULAR; INTRAMUSCULAR at 11:06:00
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
## 2021-08-03 ENCOUNTER — APPOINTMENT (OUTPATIENT)
Dept: PHYSICAL THERAPY | Facility: HOSPITAL | Age: 51
End: 2021-08-03
Attending: ORTHOPAEDIC SURGERY
Payer: COMMERCIAL

## 2021-08-03 ENCOUNTER — TELEPHONE (OUTPATIENT)
Dept: PHYSICAL THERAPY | Facility: HOSPITAL | Age: 51
End: 2021-08-03

## 2021-08-10 ENCOUNTER — APPOINTMENT (OUTPATIENT)
Dept: PHYSICAL THERAPY | Facility: HOSPITAL | Age: 51
End: 2021-08-10
Attending: ORTHOPAEDIC SURGERY
Payer: COMMERCIAL

## 2021-08-12 ENCOUNTER — APPOINTMENT (OUTPATIENT)
Dept: PHYSICAL THERAPY | Facility: HOSPITAL | Age: 51
End: 2021-08-12
Attending: ORTHOPAEDIC SURGERY
Payer: COMMERCIAL

## 2021-08-17 ENCOUNTER — APPOINTMENT (OUTPATIENT)
Dept: PHYSICAL THERAPY | Facility: HOSPITAL | Age: 51
End: 2021-08-17
Attending: ORTHOPAEDIC SURGERY
Payer: COMMERCIAL

## 2021-08-19 ENCOUNTER — APPOINTMENT (OUTPATIENT)
Dept: PHYSICAL THERAPY | Facility: HOSPITAL | Age: 51
End: 2021-08-19
Attending: ORTHOPAEDIC SURGERY
Payer: COMMERCIAL

## 2021-08-24 ENCOUNTER — APPOINTMENT (OUTPATIENT)
Dept: PHYSICAL THERAPY | Facility: HOSPITAL | Age: 51
End: 2021-08-24
Attending: ORTHOPAEDIC SURGERY
Payer: COMMERCIAL

## 2021-08-26 ENCOUNTER — APPOINTMENT (OUTPATIENT)
Dept: PHYSICAL THERAPY | Facility: HOSPITAL | Age: 51
End: 2021-08-26
Attending: ORTHOPAEDIC SURGERY
Payer: COMMERCIAL

## 2022-08-18 ENCOUNTER — HOSPITAL ENCOUNTER (EMERGENCY)
Facility: HOSPITAL | Age: 52
Discharge: HOME | End: 2022-08-19
Attending: STUDENT IN AN ORGANIZED HEALTH CARE EDUCATION/TRAINING PROGRAM
Payer: MEDICAID

## 2022-08-18 ENCOUNTER — APPOINTMENT (OUTPATIENT)
Dept: GENERAL RADIOLOGY | Facility: HOSPITAL | Age: 52
End: 2022-08-18
Payer: MEDICAID

## 2022-08-18 ENCOUNTER — APPOINTMENT (OUTPATIENT)
Dept: CT IMAGING | Facility: HOSPITAL | Age: 52
End: 2022-08-18
Attending: STUDENT IN AN ORGANIZED HEALTH CARE EDUCATION/TRAINING PROGRAM
Payer: MEDICAID

## 2022-08-18 VITALS
OXYGEN SATURATION: 99 % | DIASTOLIC BLOOD PRESSURE: 85 MMHG | WEIGHT: 220.44 LBS | HEART RATE: 72 BPM | HEIGHT: 66.14 IN | BODY MASS INDEX: 35.43 KG/M2 | RESPIRATION RATE: 18 BRPM | TEMPERATURE: 99 F | SYSTOLIC BLOOD PRESSURE: 137 MMHG

## 2022-08-18 DIAGNOSIS — R10.9 ABDOMINAL PAIN, ACUTE: ICD-10-CM

## 2022-08-18 DIAGNOSIS — J20.9 ACUTE BRONCHITIS, UNSPECIFIED ORGANISM: Primary | ICD-10-CM

## 2022-08-18 LAB
ALBUMIN SERPL-MCNC: 3.7 G/DL (ref 3.4–5)
ALBUMIN/GLOB SERPL: 1 {RATIO} (ref 1–2)
ALP LIVER SERPL-CCNC: 93 U/L
ALT SERPL-CCNC: 38 U/L
ANION GAP SERPL CALC-SCNC: 3 MMOL/L (ref 0–18)
APTT PPP: 28.2 SECONDS (ref 23.3–35.6)
AST SERPL-CCNC: 35 U/L (ref 15–37)
BASOPHILS # BLD AUTO: 0.03 X10(3) UL (ref 0–0.2)
BASOPHILS NFR BLD AUTO: 0.4 %
BILIRUB SERPL-MCNC: 0.4 MG/DL (ref 0.1–2)
BILIRUB UR QL STRIP.AUTO: NEGATIVE
BUN BLD-MCNC: 11 MG/DL (ref 7–18)
CALCIUM BLD-MCNC: 8.7 MG/DL (ref 8.5–10.1)
CHLORIDE SERPL-SCNC: 108 MMOL/L (ref 98–112)
CLARITY UR REFRACT.AUTO: CLEAR
CO2 SERPL-SCNC: 26 MMOL/L (ref 21–32)
COLOR UR AUTO: YELLOW
CREAT BLD-MCNC: 0.74 MG/DL
EOSINOPHIL # BLD AUTO: 0.11 X10(3) UL (ref 0–0.7)
EOSINOPHIL NFR BLD AUTO: 1.6 %
ERYTHROCYTE [DISTWIDTH] IN BLOOD BY AUTOMATED COUNT: 13.1 %
GFR SERPLBLD BASED ON 1.73 SQ M-ARVRAT: 96 ML/MIN/1.73M2 (ref 60–?)
GLOBULIN PLAS-MCNC: 3.6 G/DL (ref 2.8–4.4)
GLUCOSE BLD-MCNC: 95 MG/DL (ref 70–99)
GLUCOSE UR STRIP.AUTO-MCNC: NEGATIVE MG/DL
HCT VFR BLD AUTO: 39.9 %
HGB BLD-MCNC: 12.8 G/DL
IMM GRANULOCYTES # BLD AUTO: 0.02 X10(3) UL (ref 0–1)
IMM GRANULOCYTES NFR BLD: 0.3 %
INR BLD: 1.04 (ref 0.85–1.16)
KETONES UR STRIP.AUTO-MCNC: NEGATIVE MG/DL
LEUKOCYTE ESTERASE UR QL STRIP.AUTO: NEGATIVE
LIPASE SERPL-CCNC: 118 U/L (ref 73–393)
LYMPHOCYTES # BLD AUTO: 1.13 X10(3) UL (ref 1–4)
LYMPHOCYTES NFR BLD AUTO: 16.1 %
MCH RBC QN AUTO: 28 PG (ref 26–34)
MCHC RBC AUTO-ENTMCNC: 32.1 G/DL (ref 31–37)
MCV RBC AUTO: 87.3 FL
MONOCYTES # BLD AUTO: 0.5 X10(3) UL (ref 0.1–1)
MONOCYTES NFR BLD AUTO: 7.1 %
NEUTROPHILS # BLD AUTO: 5.22 X10 (3) UL (ref 1.5–7.7)
NEUTROPHILS # BLD AUTO: 5.22 X10(3) UL (ref 1.5–7.7)
NEUTROPHILS NFR BLD AUTO: 74.5 %
NITRITE UR QL STRIP.AUTO: NEGATIVE
OSMOLALITY SERPL CALC.SUM OF ELEC: 283 MOSM/KG (ref 275–295)
PH UR STRIP.AUTO: 7 [PH] (ref 5–8)
PLATELET # BLD AUTO: 177 10(3)UL (ref 150–450)
POTASSIUM SERPL-SCNC: 3.9 MMOL/L (ref 3.5–5.1)
PROT SERPL-MCNC: 7.3 G/DL (ref 6.4–8.2)
PROT UR STRIP.AUTO-MCNC: NEGATIVE MG/DL
PROTHROMBIN TIME: 13.6 SECONDS (ref 11.6–14.8)
RBC # BLD AUTO: 4.57 X10(6)UL
RBC UR QL AUTO: NEGATIVE
SARS-COV-2 RNA RESP QL NAA+PROBE: NOT DETECTED
SODIUM SERPL-SCNC: 137 MMOL/L (ref 136–145)
SP GR UR STRIP.AUTO: 1.01 (ref 1–1.03)
UROBILINOGEN UR STRIP.AUTO-MCNC: 0.2 MG/DL
WBC # BLD AUTO: 7 X10(3) UL (ref 4–11)

## 2022-08-18 PROCEDURE — 99284 EMERGENCY DEPT VISIT MOD MDM: CPT | Performed by: STUDENT IN AN ORGANIZED HEALTH CARE EDUCATION/TRAINING PROGRAM

## 2022-08-18 PROCEDURE — 83690 ASSAY OF LIPASE: CPT | Performed by: STUDENT IN AN ORGANIZED HEALTH CARE EDUCATION/TRAINING PROGRAM

## 2022-08-18 PROCEDURE — 74177 CT ABD & PELVIS W/CONTRAST: CPT | Performed by: STUDENT IN AN ORGANIZED HEALTH CARE EDUCATION/TRAINING PROGRAM

## 2022-08-18 PROCEDURE — 85610 PROTHROMBIN TIME: CPT | Performed by: STUDENT IN AN ORGANIZED HEALTH CARE EDUCATION/TRAINING PROGRAM

## 2022-08-18 PROCEDURE — 96374 THER/PROPH/DIAG INJ IV PUSH: CPT | Performed by: STUDENT IN AN ORGANIZED HEALTH CARE EDUCATION/TRAINING PROGRAM

## 2022-08-18 PROCEDURE — 81003 URINALYSIS AUTO W/O SCOPE: CPT | Performed by: STUDENT IN AN ORGANIZED HEALTH CARE EDUCATION/TRAINING PROGRAM

## 2022-08-18 PROCEDURE — 71046 X-RAY EXAM CHEST 2 VIEWS: CPT | Performed by: STUDENT IN AN ORGANIZED HEALTH CARE EDUCATION/TRAINING PROGRAM

## 2022-08-18 PROCEDURE — 96375 TX/PRO/DX INJ NEW DRUG ADDON: CPT | Performed by: STUDENT IN AN ORGANIZED HEALTH CARE EDUCATION/TRAINING PROGRAM

## 2022-08-18 PROCEDURE — 85730 THROMBOPLASTIN TIME PARTIAL: CPT | Performed by: STUDENT IN AN ORGANIZED HEALTH CARE EDUCATION/TRAINING PROGRAM

## 2022-08-18 PROCEDURE — 85025 COMPLETE CBC W/AUTO DIFF WBC: CPT | Performed by: STUDENT IN AN ORGANIZED HEALTH CARE EDUCATION/TRAINING PROGRAM

## 2022-08-18 PROCEDURE — 80053 COMPREHEN METABOLIC PANEL: CPT | Performed by: STUDENT IN AN ORGANIZED HEALTH CARE EDUCATION/TRAINING PROGRAM

## 2022-08-18 PROCEDURE — 96361 HYDRATE IV INFUSION ADD-ON: CPT | Performed by: STUDENT IN AN ORGANIZED HEALTH CARE EDUCATION/TRAINING PROGRAM

## 2022-08-18 RX ORDER — IOHEXOL 350 MG/ML
100 INJECTION, SOLUTION INTRAVENOUS
Status: COMPLETED | OUTPATIENT
Start: 2022-08-18 | End: 2022-08-18

## 2022-08-18 RX ORDER — METOCLOPRAMIDE HYDROCHLORIDE 5 MG/ML
10 INJECTION INTRAMUSCULAR; INTRAVENOUS ONCE
Status: COMPLETED | OUTPATIENT
Start: 2022-08-18 | End: 2022-08-18

## 2022-08-18 RX ORDER — DIPHENHYDRAMINE HYDROCHLORIDE 50 MG/ML
25 INJECTION INTRAMUSCULAR; INTRAVENOUS ONCE
Status: COMPLETED | OUTPATIENT
Start: 2022-08-18 | End: 2022-08-18

## 2022-08-18 RX ORDER — KETOROLAC TROMETHAMINE 15 MG/ML
15 INJECTION, SOLUTION INTRAMUSCULAR; INTRAVENOUS ONCE
Status: COMPLETED | OUTPATIENT
Start: 2022-08-18 | End: 2022-08-18

## 2022-08-18 NOTE — ED INITIAL ASSESSMENT (HPI)
Patient presenting with a cough for 3 days, that's getting worse. Patient had something similar 1 month ago, at that moment she was sent with antibiotics that she only took for 2 days because she started having GI issues because of it.

## 2022-08-19 RX ORDER — ALBUTEROL SULFATE 90 UG/1
2 AEROSOL, METERED RESPIRATORY (INHALATION) EVERY 4 HOURS PRN
Qty: 1 EACH | Refills: 0 | Status: SHIPPED | OUTPATIENT
Start: 2022-08-18 | End: 2022-09-17

## 2022-08-19 RX ORDER — DICYCLOMINE HCL 20 MG
20 TABLET ORAL 4 TIMES DAILY PRN
Qty: 30 TABLET | Refills: 0 | Status: SHIPPED | OUTPATIENT
Start: 2022-08-18 | End: 2022-09-17

## 2022-08-19 RX ORDER — CODEINE PHOSPHATE AND GUAIFENESIN 10; 100 MG/5ML; MG/5ML
5 SOLUTION ORAL EVERY 6 HOURS PRN
Qty: 180 ML | Refills: 0 | Status: SHIPPED | OUTPATIENT
Start: 2022-08-19

## 2022-08-19 RX ORDER — BENZONATATE 100 MG/1
100 CAPSULE ORAL 3 TIMES DAILY PRN
Qty: 30 CAPSULE | Refills: 0 | Status: SHIPPED | OUTPATIENT
Start: 2022-08-18 | End: 2022-09-17

## 2022-08-30 ENCOUNTER — APPOINTMENT (OUTPATIENT)
Dept: CT IMAGING | Facility: HOSPITAL | Age: 52
End: 2022-08-30
Attending: STUDENT IN AN ORGANIZED HEALTH CARE EDUCATION/TRAINING PROGRAM
Payer: MEDICAID

## 2022-08-30 ENCOUNTER — APPOINTMENT (OUTPATIENT)
Dept: GENERAL RADIOLOGY | Facility: HOSPITAL | Age: 52
End: 2022-08-30
Attending: STUDENT IN AN ORGANIZED HEALTH CARE EDUCATION/TRAINING PROGRAM
Payer: MEDICAID

## 2022-08-30 ENCOUNTER — HOSPITAL ENCOUNTER (EMERGENCY)
Facility: HOSPITAL | Age: 52
Discharge: HOME | End: 2022-08-30
Attending: STUDENT IN AN ORGANIZED HEALTH CARE EDUCATION/TRAINING PROGRAM
Payer: MEDICAID

## 2022-08-30 VITALS
SYSTOLIC BLOOD PRESSURE: 133 MMHG | DIASTOLIC BLOOD PRESSURE: 94 MMHG | OXYGEN SATURATION: 98 % | RESPIRATION RATE: 15 BRPM | HEART RATE: 78 BPM

## 2022-08-30 DIAGNOSIS — R05.3 CHRONIC COUGH: Primary | ICD-10-CM

## 2022-08-30 LAB
ANION GAP SERPL CALC-SCNC: 4 MMOL/L (ref 0–18)
BASOPHILS # BLD AUTO: 0.08 X10(3) UL (ref 0–0.2)
BASOPHILS NFR BLD AUTO: 0.9 %
BUN BLD-MCNC: 9 MG/DL (ref 7–18)
CALCIUM BLD-MCNC: 8.5 MG/DL (ref 8.5–10.1)
CHLORIDE SERPL-SCNC: 111 MMOL/L (ref 98–112)
CO2 SERPL-SCNC: 24 MMOL/L (ref 21–32)
CREAT BLD-MCNC: 0.8 MG/DL
EOSINOPHIL # BLD AUTO: 0.23 X10(3) UL (ref 0–0.7)
EOSINOPHIL NFR BLD AUTO: 2.5 %
ERYTHROCYTE [DISTWIDTH] IN BLOOD BY AUTOMATED COUNT: 12.9 %
GFR SERPLBLD BASED ON 1.73 SQ M-ARVRAT: 88 ML/MIN/1.73M2 (ref 60–?)
GLUCOSE BLD-MCNC: 128 MG/DL (ref 70–99)
HCT VFR BLD AUTO: 40.2 %
HGB BLD-MCNC: 13.3 G/DL
IMM GRANULOCYTES # BLD AUTO: 0.07 X10(3) UL (ref 0–1)
IMM GRANULOCYTES NFR BLD: 0.8 %
LYMPHOCYTES # BLD AUTO: 2.08 X10(3) UL (ref 1–4)
LYMPHOCYTES NFR BLD AUTO: 22.8 %
MCH RBC QN AUTO: 28.5 PG (ref 26–34)
MCHC RBC AUTO-ENTMCNC: 33.1 G/DL (ref 31–37)
MCV RBC AUTO: 86.1 FL
MONOCYTES # BLD AUTO: 0.57 X10(3) UL (ref 0.1–1)
MONOCYTES NFR BLD AUTO: 6.3 %
NEUTROPHILS # BLD AUTO: 6.08 X10 (3) UL (ref 1.5–7.7)
NEUTROPHILS # BLD AUTO: 6.08 X10(3) UL (ref 1.5–7.7)
NEUTROPHILS NFR BLD AUTO: 66.7 %
OSMOLALITY SERPL CALC.SUM OF ELEC: 288 MOSM/KG (ref 275–295)
PLATELET # BLD AUTO: 227 10(3)UL (ref 150–450)
POTASSIUM SERPL-SCNC: 3.8 MMOL/L (ref 3.5–5.1)
RBC # BLD AUTO: 4.67 X10(6)UL
SODIUM SERPL-SCNC: 139 MMOL/L (ref 136–145)
TROPONIN I HIGH SENSITIVITY: 4 NG/L
WBC # BLD AUTO: 9.1 X10(3) UL (ref 4–11)

## 2022-08-30 PROCEDURE — 36415 COLL VENOUS BLD VENIPUNCTURE: CPT

## 2022-08-30 PROCEDURE — 85025 COMPLETE CBC W/AUTO DIFF WBC: CPT | Performed by: STUDENT IN AN ORGANIZED HEALTH CARE EDUCATION/TRAINING PROGRAM

## 2022-08-30 PROCEDURE — 99284 EMERGENCY DEPT VISIT MOD MDM: CPT

## 2022-08-30 PROCEDURE — 71260 CT THORAX DX C+: CPT | Performed by: STUDENT IN AN ORGANIZED HEALTH CARE EDUCATION/TRAINING PROGRAM

## 2022-08-30 PROCEDURE — 80048 BASIC METABOLIC PNL TOTAL CA: CPT | Performed by: STUDENT IN AN ORGANIZED HEALTH CARE EDUCATION/TRAINING PROGRAM

## 2022-08-30 PROCEDURE — 84484 ASSAY OF TROPONIN QUANT: CPT | Performed by: STUDENT IN AN ORGANIZED HEALTH CARE EDUCATION/TRAINING PROGRAM

## 2022-08-30 PROCEDURE — 93005 ELECTROCARDIOGRAM TRACING: CPT

## 2022-08-30 PROCEDURE — 99285 EMERGENCY DEPT VISIT HI MDM: CPT

## 2022-08-30 PROCEDURE — 71046 X-RAY EXAM CHEST 2 VIEWS: CPT | Performed by: STUDENT IN AN ORGANIZED HEALTH CARE EDUCATION/TRAINING PROGRAM

## 2022-08-30 PROCEDURE — 93010 ELECTROCARDIOGRAM REPORT: CPT

## 2022-08-30 RX ORDER — AZITHROMYCIN 250 MG/1
250 TABLET, FILM COATED ORAL DAILY
COMMUNITY

## 2022-08-30 RX ORDER — HYDROCHLOROTHIAZIDE 25 MG/1
25 TABLET ORAL DAILY
Qty: 30 TABLET | Refills: 0 | Status: SHIPPED | OUTPATIENT
Start: 2022-08-30

## 2022-08-31 NOTE — ED INITIAL ASSESSMENT (HPI)
Pt arrives via walk c/o cough and suspected collapsed lung from [facility], [address] A&Ox4. Clear speech. Respirations easy and nonlabored.

## 2022-09-01 LAB
ATRIAL RATE: 79 BPM
P AXIS: 11 DEGREES
P-R INTERVAL: 138 MS
Q-T INTERVAL: 368 MS
QRS DURATION: 84 MS
QTC CALCULATION (BEZET): 421 MS
R AXIS: 0 DEGREES
T AXIS: 28 DEGREES
VENTRICULAR RATE: 79 BPM

## 2022-09-21 ENCOUNTER — OFFICE VISIT (OUTPATIENT)
Dept: FAMILY MEDICINE CLINIC | Facility: CLINIC | Age: 52
End: 2022-09-21

## 2022-09-21 VITALS
HEART RATE: 74 BPM | WEIGHT: 219 LBS | SYSTOLIC BLOOD PRESSURE: 135 MMHG | DIASTOLIC BLOOD PRESSURE: 87 MMHG | BODY MASS INDEX: 35.2 KG/M2 | RESPIRATION RATE: 19 BRPM | HEIGHT: 66.14 IN

## 2022-09-21 DIAGNOSIS — R10.84 GENERALIZED ABDOMINAL PAIN: ICD-10-CM

## 2022-09-21 DIAGNOSIS — K64.4 EXTERNAL HEMORRHOIDS: ICD-10-CM

## 2022-09-21 DIAGNOSIS — F43.10 PTSD (POST-TRAUMATIC STRESS DISORDER): ICD-10-CM

## 2022-09-21 DIAGNOSIS — R73.09 ELEVATED GLUCOSE: ICD-10-CM

## 2022-09-21 DIAGNOSIS — I10 ESSENTIAL HYPERTENSION: ICD-10-CM

## 2022-09-21 DIAGNOSIS — Z13.220 SCREENING CHOLESTEROL LEVEL: ICD-10-CM

## 2022-09-21 DIAGNOSIS — R60.0 LOWER EXTREMITY EDEMA: Primary | ICD-10-CM

## 2022-09-21 DIAGNOSIS — I82.5Z2 CHRONIC DEEP VEIN THROMBOSIS (DVT) OF DISTAL VEIN OF LEFT LOWER EXTREMITY (HCC): ICD-10-CM

## 2022-09-21 PROBLEM — T14.90XA BLUNT FORCE INJURY: Status: ACTIVE | Noted: 2020-06-21

## 2022-09-21 PROBLEM — S32.009A CLOSED FRACTURE OF TRANSVERSE PROCESS OF LUMBAR VERTEBRA, INITIAL ENCOUNTER (HCC): Status: RESOLVED | Noted: 2020-06-21 | Resolved: 2022-09-21

## 2022-09-21 PROBLEM — I82.5Z9 CHRONIC DEEP VEIN THROMBOSIS (DVT) OF DISTAL VEIN OF LOWER EXTREMITY (HCC): Status: ACTIVE | Noted: 2020-02-22

## 2022-09-21 PROBLEM — T14.90XA BLUNT FORCE INJURY: Status: RESOLVED | Noted: 2020-06-21 | Resolved: 2022-09-21
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
## 2022-09-21 PROBLEM — M72.2 PLANTAR FASCIITIS: Status: ACTIVE | Noted: 2022-09-21

## 2022-09-21 PROBLEM — S12.500A: Status: RESOLVED | Noted: 2020-06-21 | Resolved: 2022-09-21

## 2022-09-21 PROBLEM — I87.2 PERIPHERAL VENOUS INSUFFICIENCY: Status: ACTIVE | Noted: 2022-09-21

## 2022-09-21 PROBLEM — S12.600A: Status: RESOLVED | Noted: 2020-06-21 | Resolved: 2022-09-21

## 2022-09-21 PROBLEM — S30.1XXA ABDOMINAL HEMATOMA: Status: RESOLVED | Noted: 2020-06-21 | Resolved: 2022-09-21

## 2022-09-21 PROCEDURE — 99204 OFFICE O/P NEW MOD 45 MIN: CPT | Performed by: FAMILY MEDICINE

## 2022-09-21 PROCEDURE — 3075F SYST BP GE 130 - 139MM HG: CPT | Performed by: FAMILY MEDICINE

## 2022-09-21 PROCEDURE — 3079F DIAST BP 80-89 MM HG: CPT | Performed by: FAMILY MEDICINE

## 2022-09-21 PROCEDURE — 3008F BODY MASS INDEX DOCD: CPT | Performed by: FAMILY MEDICINE

## 2022-09-21 RX ORDER — FUROSEMIDE 40 MG/1
TABLET ORAL
COMMUNITY
Start: 2022-09-21

## 2022-09-21 RX ORDER — POLYETHYLENE GLYCOL 3350 17 G/17G
17 POWDER, FOR SOLUTION ORAL DAILY
Qty: 100 EACH | Refills: 0 | Status: SHIPPED | OUTPATIENT
Start: 2022-09-21

## 2022-09-21 RX ORDER — ESCITALOPRAM OXALATE 10 MG/1
TABLET ORAL
Qty: 27 TABLET | Refills: 0 | Status: SHIPPED | OUTPATIENT
Start: 2022-09-21 | End: 2022-10-21

## 2022-09-21 RX ORDER — HYDROXYZINE HYDROCHLORIDE 25 MG/1
25 TABLET, FILM COATED ORAL EVERY 8 HOURS PRN
Qty: 30 TABLET | Refills: 0 | Status: SHIPPED | OUTPATIENT
Start: 2022-09-21

## 2022-09-21 RX ORDER — PANTOPRAZOLE SODIUM 40 MG/1
40 TABLET, DELAYED RELEASE ORAL DAILY
COMMUNITY
Start: 2022-08-25

## 2022-09-21 RX ORDER — DOCUSATE SODIUM 100 MG/1
100 CAPSULE, LIQUID FILLED ORAL 2 TIMES DAILY
Qty: 30 CAPSULE | Refills: 0 | Status: SHIPPED | OUTPATIENT
Start: 2022-09-21

## 2022-09-21 RX ORDER — LOSARTAN POTASSIUM 50 MG/1
50 TABLET ORAL DAILY
Qty: 30 TABLET | Refills: 1 | Status: SHIPPED | OUTPATIENT
Start: 2022-09-21 | End: 2022-10-21

## 2022-09-22 ENCOUNTER — DOCUMENTATION ONLY (OUTPATIENT)
Dept: FAMILY MEDICINE CLINIC | Facility: CLINIC | Age: 52
End: 2022-09-22

## 2022-09-22 NOTE — PROGRESS NOTES
Per Octavia Nicholas navigator    On 9-22-22, the following referrals for therapy were placed in the mail to your patient's home per her preference:     [place] [address] [facility] [address], [place], [address]   [phone]     [facility] Department   [address], [place], [address]   [phone] [address], [id number] [phone]

## 2022-09-25 ENCOUNTER — HOSPITAL ENCOUNTER (EMERGENCY)
Facility: HOSPITAL | Age: 52
Discharge: HOME | End: 2022-09-25
Attending: EMERGENCY MEDICINE

## 2022-09-25 ENCOUNTER — APPOINTMENT (OUTPATIENT)
Dept: CT IMAGING | Facility: HOSPITAL | Age: 52
End: 2022-09-25
Attending: EMERGENCY MEDICINE

## 2022-09-25 VITALS
TEMPERATURE: 98 F | BODY MASS INDEX: 33.75 KG/M2 | OXYGEN SATURATION: 95 % | HEART RATE: 53 BPM | SYSTOLIC BLOOD PRESSURE: 144 MMHG | RESPIRATION RATE: 15 BRPM | WEIGHT: 210 LBS | DIASTOLIC BLOOD PRESSURE: 74 MMHG | HEIGHT: 66.14 IN

## 2022-09-25 DIAGNOSIS — G44.86 CERVICOGENIC HEADACHE: Primary | ICD-10-CM

## 2022-09-25 LAB
ALBUMIN SERPL-MCNC: 3.7 G/DL (ref 3.4–5)
ALBUMIN/GLOB SERPL: 0.9 {RATIO} (ref 1–2)
ALP LIVER SERPL-CCNC: 73 U/L
ALT SERPL-CCNC: 26 U/L
ANION GAP SERPL CALC-SCNC: 4 MMOL/L (ref 0–18)
AST SERPL-CCNC: 26 U/L (ref 15–37)
BASOPHILS # BLD AUTO: 0.07 X10(3) UL (ref 0–0.2)
BASOPHILS NFR BLD AUTO: 1.1 %
BILIRUB SERPL-MCNC: 0.3 MG/DL (ref 0.1–2)
BUN BLD-MCNC: 17 MG/DL (ref 7–18)
CALCIUM BLD-MCNC: 9.1 MG/DL (ref 8.5–10.1)
CHLORIDE SERPL-SCNC: 109 MMOL/L (ref 98–112)
CO2 SERPL-SCNC: 27 MMOL/L (ref 21–32)
CREAT BLD-MCNC: 0.83 MG/DL
EOSINOPHIL # BLD AUTO: 0.17 X10(3) UL (ref 0–0.7)
EOSINOPHIL NFR BLD AUTO: 2.7 %
ERYTHROCYTE [DISTWIDTH] IN BLOOD BY AUTOMATED COUNT: 13.1 %
GFR SERPLBLD BASED ON 1.73 SQ M-ARVRAT: 84 ML/MIN/1.73M2 (ref 60–?)
GLOBULIN PLAS-MCNC: 3.9 G/DL (ref 2.8–4.4)
GLUCOSE BLD-MCNC: 111 MG/DL (ref 70–99)
HCT VFR BLD AUTO: 42 %
HGB BLD-MCNC: 13.9 G/DL
IMM GRANULOCYTES # BLD AUTO: 0.03 X10(3) UL (ref 0–1)
IMM GRANULOCYTES NFR BLD: 0.5 %
LYMPHOCYTES # BLD AUTO: 2.19 X10(3) UL (ref 1–4)
LYMPHOCYTES NFR BLD AUTO: 34.3 %
MCH RBC QN AUTO: 28.7 PG (ref 26–34)
MCHC RBC AUTO-ENTMCNC: 33.1 G/DL (ref 31–37)
MCV RBC AUTO: 86.6 FL
MONOCYTES # BLD AUTO: 0.47 X10(3) UL (ref 0.1–1)
MONOCYTES NFR BLD AUTO: 7.4 %
NEUTROPHILS # BLD AUTO: 3.45 X10 (3) UL (ref 1.5–7.7)
NEUTROPHILS # BLD AUTO: 3.45 X10(3) UL (ref 1.5–7.7)
NEUTROPHILS NFR BLD AUTO: 54 %
OSMOLALITY SERPL CALC.SUM OF ELEC: 292 MOSM/KG (ref 275–295)
PLATELET # BLD AUTO: 227 10(3)UL (ref 150–450)
POTASSIUM SERPL-SCNC: 4 MMOL/L (ref 3.5–5.1)
PROT SERPL-MCNC: 7.6 G/DL (ref 6.4–8.2)
RBC # BLD AUTO: 4.85 X10(6)UL
SODIUM SERPL-SCNC: 140 MMOL/L (ref 136–145)
WBC # BLD AUTO: 6.4 X10(3) UL (ref 4–11)

## 2022-09-25 PROCEDURE — 99284 EMERGENCY DEPT VISIT MOD MDM: CPT | Performed by: EMERGENCY MEDICINE

## 2022-09-25 PROCEDURE — 80053 COMPREHEN METABOLIC PANEL: CPT | Performed by: EMERGENCY MEDICINE

## 2022-09-25 PROCEDURE — 96374 THER/PROPH/DIAG INJ IV PUSH: CPT | Performed by: EMERGENCY MEDICINE

## 2022-09-25 PROCEDURE — 96361 HYDRATE IV INFUSION ADD-ON: CPT | Performed by: EMERGENCY MEDICINE

## 2022-09-25 PROCEDURE — 85025 COMPLETE CBC W/AUTO DIFF WBC: CPT | Performed by: EMERGENCY MEDICINE

## 2022-09-25 PROCEDURE — 96375 TX/PRO/DX INJ NEW DRUG ADDON: CPT | Performed by: EMERGENCY MEDICINE

## 2022-09-25 PROCEDURE — 70498 CT ANGIOGRAPHY NECK: CPT | Performed by: EMERGENCY MEDICINE

## 2022-09-25 PROCEDURE — 70496 CT ANGIOGRAPHY HEAD: CPT | Performed by: EMERGENCY MEDICINE

## 2022-09-25 RX ORDER — CYCLOBENZAPRINE HCL 10 MG
10 TABLET ORAL 3 TIMES DAILY PRN
Qty: 20 TABLET | Refills: 0 | Status: SHIPPED | OUTPATIENT
Start: 2022-09-25 | End: 2022-10-02

## 2022-09-25 RX ORDER — ONDANSETRON 2 MG/ML
4 INJECTION INTRAMUSCULAR; INTRAVENOUS ONCE
Status: COMPLETED | OUTPATIENT
Start: 2022-09-25 | End: 2022-09-25

## 2022-09-25 RX ORDER — CYCLOBENZAPRINE HCL 10 MG
10 TABLET ORAL ONCE
Status: COMPLETED | OUTPATIENT
Start: 2022-09-25 | End: 2022-09-25

## 2022-09-25 RX ORDER — IOHEXOL 350 MG/ML
75 INJECTION, SOLUTION INTRAVENOUS
Status: COMPLETED | OUTPATIENT
Start: 2022-09-25 | End: 2022-09-25

## 2022-09-25 RX ORDER — ACETAMINOPHEN 500 MG
1000 TABLET ORAL EVERY 6 HOURS PRN
Qty: 30 TABLET | Refills: 0 | Status: SHIPPED | OUTPATIENT
Start: 2022-09-25 | End: 2022-10-02

## 2022-09-25 RX ORDER — MORPHINE SULFATE 2 MG/ML
2 INJECTION, SOLUTION INTRAMUSCULAR; INTRAVENOUS ONCE
Status: COMPLETED | OUTPATIENT
Start: 2022-09-25 | End: 2022-09-25

## 2022-09-25 NOTE — ED INITIAL ASSESSMENT (HPI)
Patient arrived to the ED from home with c/o headache x 3 days. Patient states she has a lot of pain in the back of her head and left eye that is sharp. Sensitivity to light. Complaining of nausea. Been taking Ibuprofen with minimal relief. AAOx4 and rates pain 7/10. Welsh speaking.

## 2022-09-28 ENCOUNTER — APPOINTMENT (OUTPATIENT)
Dept: HEMATOLOGY/ONCOLOGY | Facility: HOSPITAL | Age: 52
End: 2022-09-28
Attending: INTERNAL MEDICINE
Payer: MEDICAID

## 2022-09-28 ENCOUNTER — TELEPHONE (OUTPATIENT)
Dept: HEMATOLOGY/ONCOLOGY | Facility: HOSPITAL | Age: 52
End: 2022-09-28

## 2022-09-28 NOTE — TELEPHONE ENCOUNTER
Patient called and left message calling to reschedule consult appointment. Left message to please call [phone] to reschedule.

## 2022-09-29 ENCOUNTER — TELEPHONE (OUTPATIENT)
Dept: HEMATOLOGY/ONCOLOGY | Facility: HOSPITAL | Age: 52
End: 2022-09-29

## 2022-09-29 NOTE — TELEPHONE ENCOUNTER
Jamey Cogan patient cx appt for consult 9/28 because he thinks he has covid. I have not spoken to him to confirm. Looking for new date and time.  Thank you tarah

## 2022-09-29 NOTE — TELEPHONE ENCOUNTER
Patient calling with Interpretor   To reschedule consult    Stated she had cold/ covid symptoms        Please call with interpretor

## 2022-10-04 ENCOUNTER — HOSPITAL ENCOUNTER (OUTPATIENT)
Dept: LAB | Facility: HOSPITAL | Age: 52
Discharge: HOME | End: 2022-10-04
Attending: FAMILY MEDICINE
Payer: MEDICAID

## 2022-10-04 ENCOUNTER — HOSPITAL ENCOUNTER (OUTPATIENT)
Dept: CV DIAGNOSTICS | Facility: HOSPITAL | Age: 52
Discharge: HOME | End: 2022-10-04
Attending: FAMILY MEDICINE
Payer: MEDICAID

## 2022-10-04 DIAGNOSIS — R73.09 ELEVATED GLUCOSE: ICD-10-CM

## 2022-10-04 DIAGNOSIS — R60.0 LOWER EXTREMITY EDEMA: ICD-10-CM

## 2022-10-04 DIAGNOSIS — Z13.220 SCREENING CHOLESTEROL LEVEL: ICD-10-CM

## 2022-10-04 PROBLEM — R73.03 PREDIABETES: Status: ACTIVE | Noted: 2022-10-04

## 2022-10-04 LAB
ALBUMIN SERPL-MCNC: 3.5 G/DL (ref 3.4–5)
ALBUMIN/GLOB SERPL: 1 {RATIO} (ref 1–2)
ALP LIVER SERPL-CCNC: 68 U/L
ALT SERPL-CCNC: 35 U/L
ANION GAP SERPL CALC-SCNC: 5 MMOL/L (ref 0–18)
AST SERPL-CCNC: 28 U/L (ref 15–37)
BILIRUB SERPL-MCNC: 0.7 MG/DL (ref 0.1–2)
BUN BLD-MCNC: 12 MG/DL (ref 7–18)
CALCIUM BLD-MCNC: 9.1 MG/DL (ref 8.5–10.1)
CHLORIDE SERPL-SCNC: 111 MMOL/L (ref 98–112)
CHOLEST SERPL-MCNC: 180 MG/DL (ref ?–200)
CO2 SERPL-SCNC: 24 MMOL/L (ref 21–32)
CREAT BLD-MCNC: 0.83 MG/DL
EST. AVERAGE GLUCOSE BLD GHB EST-MCNC: 120 MG/DL (ref 68–126)
FASTING PATIENT LIPID ANSWER: YES
FASTING STATUS PATIENT QL REPORTED: YES
GFR SERPLBLD BASED ON 1.73 SQ M-ARVRAT: 84 ML/MIN/1.73M2 (ref 60–?)
GLOBULIN PLAS-MCNC: 3.6 G/DL (ref 2.8–4.4)
GLUCOSE BLD-MCNC: 100 MG/DL (ref 70–99)
HBA1C MFR BLD: 5.8 % (ref ?–5.7)
HDLC SERPL-MCNC: 35 MG/DL (ref 40–59)
LDLC SERPL CALC-MCNC: 104 MG/DL (ref ?–100)
NONHDLC SERPL-MCNC: 145 MG/DL (ref ?–130)
OSMOLALITY SERPL CALC.SUM OF ELEC: 290 MOSM/KG (ref 275–295)
POTASSIUM SERPL-SCNC: 3.9 MMOL/L (ref 3.5–5.1)
PROT SERPL-MCNC: 7.1 G/DL (ref 6.4–8.2)
SODIUM SERPL-SCNC: 140 MMOL/L (ref 136–145)
TRIGL SERPL-MCNC: 235 MG/DL (ref 30–149)
VLDLC SERPL CALC-MCNC: 40 MG/DL (ref 0–30)

## 2022-10-04 PROCEDURE — 93306 TTE W/DOPPLER COMPLETE: CPT | Performed by: FAMILY MEDICINE

## 2022-10-04 PROCEDURE — 83036 HEMOGLOBIN GLYCOSYLATED A1C: CPT

## 2022-10-04 PROCEDURE — 36415 COLL VENOUS BLD VENIPUNCTURE: CPT

## 2022-10-04 PROCEDURE — 80053 COMPREHEN METABOLIC PANEL: CPT

## 2022-10-04 PROCEDURE — 80061 LIPID PANEL: CPT

## 2022-10-11 ENCOUNTER — APPOINTMENT (OUTPATIENT)
Dept: CT IMAGING | Facility: HOSPITAL | Age: 52
End: 2022-10-11
Attending: EMERGENCY MEDICINE
Payer: MEDICAID

## 2022-10-11 ENCOUNTER — HOSPITAL ENCOUNTER (EMERGENCY)
Facility: HOSPITAL | Age: 52
Discharge: HOME | End: 2022-10-11
Attending: EMERGENCY MEDICINE
Payer: MEDICAID

## 2022-10-11 VITALS
WEIGHT: 200 LBS | SYSTOLIC BLOOD PRESSURE: 136 MMHG | HEART RATE: 63 BPM | OXYGEN SATURATION: 100 % | RESPIRATION RATE: 18 BRPM | BODY MASS INDEX: 32.14 KG/M2 | TEMPERATURE: 98 F | DIASTOLIC BLOOD PRESSURE: 72 MMHG | HEIGHT: 66.14 IN

## 2022-10-11 DIAGNOSIS — R42 DIZZINESS: ICD-10-CM

## 2022-10-11 DIAGNOSIS — R10.9 ABDOMINAL PAIN OF UNKNOWN ETIOLOGY: Primary | ICD-10-CM

## 2022-10-11 LAB
ALBUMIN SERPL-MCNC: 3.8 G/DL (ref 3.4–5)
ALBUMIN/GLOB SERPL: 1 {RATIO} (ref 1–2)
ALP LIVER SERPL-CCNC: 70 U/L
ALT SERPL-CCNC: 28 U/L
ANION GAP SERPL CALC-SCNC: 7 MMOL/L (ref 0–18)
AST SERPL-CCNC: 21 U/L (ref 15–37)
BASOPHILS # BLD AUTO: 0.06 X10(3) UL (ref 0–0.2)
BASOPHILS NFR BLD AUTO: 0.9 %
BILIRUB SERPL-MCNC: 0.4 MG/DL (ref 0.1–2)
BILIRUB UR QL STRIP.AUTO: NEGATIVE
BUN BLD-MCNC: 12 MG/DL (ref 7–18)
CALCIUM BLD-MCNC: 9.6 MG/DL (ref 8.5–10.1)
CHLORIDE SERPL-SCNC: 107 MMOL/L (ref 98–112)
CLARITY UR REFRACT.AUTO: CLEAR
CO2 SERPL-SCNC: 26 MMOL/L (ref 21–32)
COLOR UR AUTO: COLORLESS
CREAT BLD-MCNC: 0.86 MG/DL
EOSINOPHIL # BLD AUTO: 0.07 X10(3) UL (ref 0–0.7)
EOSINOPHIL NFR BLD AUTO: 1.1 %
ERYTHROCYTE [DISTWIDTH] IN BLOOD BY AUTOMATED COUNT: 13.1 %
GFR SERPLBLD BASED ON 1.73 SQ M-ARVRAT: 80 ML/MIN/1.73M2 (ref 60–?)
GLOBULIN PLAS-MCNC: 3.9 G/DL (ref 2.8–4.4)
GLUCOSE BLD-MCNC: 95 MG/DL (ref 70–99)
GLUCOSE UR STRIP.AUTO-MCNC: NEGATIVE MG/DL
HCT VFR BLD AUTO: 41.5 %
HGB BLD-MCNC: 13.8 G/DL
IMM GRANULOCYTES # BLD AUTO: 0.02 X10(3) UL (ref 0–1)
IMM GRANULOCYTES NFR BLD: 0.3 %
KETONES UR STRIP.AUTO-MCNC: NEGATIVE MG/DL
LIPASE SERPL-CCNC: 135 U/L (ref 73–393)
LYMPHOCYTES # BLD AUTO: 1.56 X10(3) UL (ref 1–4)
LYMPHOCYTES NFR BLD AUTO: 24.3 %
MCH RBC QN AUTO: 28.8 PG (ref 26–34)
MCHC RBC AUTO-ENTMCNC: 33.3 G/DL (ref 31–37)
MCV RBC AUTO: 86.6 FL
MONOCYTES # BLD AUTO: 0.32 X10(3) UL (ref 0.1–1)
MONOCYTES NFR BLD AUTO: 5 %
NEUTROPHILS # BLD AUTO: 4.39 X10 (3) UL (ref 1.5–7.7)
NEUTROPHILS # BLD AUTO: 4.39 X10(3) UL (ref 1.5–7.7)
NEUTROPHILS NFR BLD AUTO: 68.4 %
NITRITE UR QL STRIP.AUTO: NEGATIVE
OSMOLALITY SERPL CALC.SUM OF ELEC: 290 MOSM/KG (ref 275–295)
PH UR STRIP.AUTO: 6 [PH] (ref 5–8)
PLATELET # BLD AUTO: 219 10(3)UL (ref 150–450)
POTASSIUM SERPL-SCNC: 3.9 MMOL/L (ref 3.5–5.1)
PROT SERPL-MCNC: 7.7 G/DL (ref 6.4–8.2)
PROT UR STRIP.AUTO-MCNC: NEGATIVE MG/DL
RBC # BLD AUTO: 4.79 X10(6)UL
RBC UR QL AUTO: NEGATIVE
SARS-COV-2 RNA RESP QL NAA+PROBE: NOT DETECTED
SODIUM SERPL-SCNC: 140 MMOL/L (ref 136–145)
SP GR UR STRIP.AUTO: 1 (ref 1–1.03)
UROBILINOGEN UR STRIP.AUTO-MCNC: <2 MG/DL
WBC # BLD AUTO: 6.4 X10(3) UL (ref 4–11)

## 2022-10-11 PROCEDURE — 99284 EMERGENCY DEPT VISIT MOD MDM: CPT

## 2022-10-11 PROCEDURE — 96361 HYDRATE IV INFUSION ADD-ON: CPT

## 2022-10-11 PROCEDURE — 96360 HYDRATION IV INFUSION INIT: CPT

## 2022-10-11 PROCEDURE — 81001 URINALYSIS AUTO W/SCOPE: CPT | Performed by: EMERGENCY MEDICINE

## 2022-10-11 PROCEDURE — 83690 ASSAY OF LIPASE: CPT | Performed by: EMERGENCY MEDICINE

## 2022-10-11 PROCEDURE — 85025 COMPLETE CBC W/AUTO DIFF WBC: CPT | Performed by: EMERGENCY MEDICINE

## 2022-10-11 PROCEDURE — 87086 URINE CULTURE/COLONY COUNT: CPT | Performed by: EMERGENCY MEDICINE

## 2022-10-11 PROCEDURE — 74177 CT ABD & PELVIS W/CONTRAST: CPT | Performed by: EMERGENCY MEDICINE

## 2022-10-11 PROCEDURE — 80053 COMPREHEN METABOLIC PANEL: CPT | Performed by: EMERGENCY MEDICINE

## 2022-10-11 PROCEDURE — 93010 ELECTROCARDIOGRAM REPORT: CPT

## 2022-10-11 PROCEDURE — 70450 CT HEAD/BRAIN W/O DYE: CPT | Performed by: EMERGENCY MEDICINE

## 2022-10-11 PROCEDURE — 93005 ELECTROCARDIOGRAM TRACING: CPT

## 2022-10-11 PROCEDURE — 99285 EMERGENCY DEPT VISIT HI MDM: CPT

## 2022-10-11 RX ORDER — MECLIZINE HYDROCHLORIDE 25 MG/1
25 TABLET ORAL ONCE
Status: COMPLETED | OUTPATIENT
Start: 2022-10-11 | End: 2022-10-11

## 2022-10-11 RX ORDER — MECLIZINE HYDROCHLORIDE 25 MG/1
25 TABLET ORAL 3 TIMES DAILY PRN
Qty: 20 TABLET | Refills: 0 | Status: SHIPPED | OUTPATIENT
Start: 2022-10-11 | End: 2022-10-31

## 2022-10-11 RX ORDER — IOHEXOL 350 MG/ML
100 INJECTION, SOLUTION INTRAVENOUS
Status: COMPLETED | OUTPATIENT
Start: 2022-10-11 | End: 2022-10-11

## 2022-10-11 RX ORDER — ONDANSETRON 2 MG/ML
4 INJECTION INTRAMUSCULAR; INTRAVENOUS ONCE
Status: DISCONTINUED | OUTPATIENT
Start: 2022-10-11 | End: 2022-10-11

## 2022-10-11 RX ORDER — SODIUM CHLORIDE 9 MG/ML
125 INJECTION, SOLUTION INTRAVENOUS CONTINUOUS
Status: DISCONTINUED | OUTPATIENT
Start: 2022-10-11 | End: 2022-10-11

## 2022-10-11 NOTE — ED QUICK NOTES
Rounding Completed    Plan of Care reviewed. Waiting for CT scan     Bed is locked and in lowest position. Call light within reach.

## 2022-10-11 NOTE — ED QUICK NOTES
Rounding Completed    Plan of Care reviewed. Waiting for CT scan. Patient reports her abdomen feels better and just a slight headache. Bed is locked and in lowest position. Call light within reach.

## 2022-10-11 NOTE — ED INITIAL ASSESSMENT (HPI)
Pt states BP at home was 170/90, pt states dizziness since this am.  Pt states throbbing HA and nausea.

## 2022-10-12 LAB
ATRIAL RATE: 63 BPM
P AXIS: 173 DEGREES
P-R INTERVAL: 156 MS
Q-T INTERVAL: 400 MS
QRS DURATION: 88 MS
QTC CALCULATION (BEZET): 409 MS
R AXIS: 193 DEGREES
T AXIS: 164 DEGREES
VENTRICULAR RATE: 63 BPM

## 2022-10-13 DIAGNOSIS — I10 ESSENTIAL HYPERTENSION: ICD-10-CM

## 2022-10-13 DIAGNOSIS — F43.10 PTSD (POST-TRAUMATIC STRESS DISORDER): ICD-10-CM

## 2022-10-14 ENCOUNTER — OFFICE VISIT (OUTPATIENT)
Dept: HEMATOLOGY/ONCOLOGY | Facility: HOSPITAL | Age: 52
End: 2022-10-14
Attending: INTERNAL MEDICINE
Payer: MEDICAID

## 2022-10-14 VITALS
HEIGHT: 66 IN | SYSTOLIC BLOOD PRESSURE: 138 MMHG | HEART RATE: 72 BPM | TEMPERATURE: 98 F | WEIGHT: 217 LBS | BODY MASS INDEX: 34.87 KG/M2 | OXYGEN SATURATION: 98 % | RESPIRATION RATE: 16 BRPM | DIASTOLIC BLOOD PRESSURE: 95 MMHG

## 2022-10-14 DIAGNOSIS — I82.5Z2 CHRONIC DEEP VEIN THROMBOSIS (DVT) OF DISTAL VEIN OF LEFT LOWER EXTREMITY (HCC): Primary | ICD-10-CM

## 2022-10-14 DIAGNOSIS — I87.009 POST-PHLEBITIC SYNDROME: ICD-10-CM

## 2022-10-14 DIAGNOSIS — I87.1 MAY-THURNER SYNDROME: Chronic | ICD-10-CM

## 2022-10-14 DIAGNOSIS — Z95.828 PRESENCE OF IVC FILTER: ICD-10-CM

## 2022-10-14 PROCEDURE — 99244 OFF/OP CNSLTJ NEW/EST MOD 40: CPT | Performed by: INTERNAL MEDICINE

## 2022-10-14 RX ORDER — LOSARTAN POTASSIUM 50 MG/1
50 TABLET ORAL DAILY
Qty: 90 TABLET | Refills: 1 | Status: SHIPPED | OUTPATIENT
Start: 2022-10-14

## 2022-10-14 RX ORDER — ESCITALOPRAM OXALATE 10 MG/1
10 TABLET ORAL DAILY
Qty: 30 TABLET | Refills: 0 | Status: SHIPPED | OUTPATIENT
Start: 2022-10-14

## 2022-10-26 DIAGNOSIS — F43.10 PTSD (POST-TRAUMATIC STRESS DISORDER): ICD-10-CM

## 2022-10-26 RX ORDER — ESCITALOPRAM OXALATE 10 MG/1
10 TABLET ORAL DAILY
Qty: 90 TABLET | Refills: 0 | Status: SHIPPED | OUTPATIENT
Start: 2022-10-26

## 2022-11-01 ENCOUNTER — OFFICE VISIT (OUTPATIENT)
Dept: GASTROENTEROLOGY | Facility: CLINIC | Age: 52
End: 2022-11-01
Payer: MEDICAID

## 2022-11-01 VITALS
WEIGHT: 213 LBS | SYSTOLIC BLOOD PRESSURE: 124 MMHG | DIASTOLIC BLOOD PRESSURE: 88 MMHG | BODY MASS INDEX: 34.23 KG/M2 | HEIGHT: 66 IN

## 2022-11-01 DIAGNOSIS — R10.9 FLANK PAIN: ICD-10-CM

## 2022-11-01 DIAGNOSIS — A04.8 HELICOBACTER PYLORI (H. PYLORI) INFECTION: ICD-10-CM

## 2022-11-01 DIAGNOSIS — R10.2 PELVIC PAIN: Primary | ICD-10-CM

## 2022-11-01 PROCEDURE — 3074F SYST BP LT 130 MM HG: CPT | Performed by: INTERNAL MEDICINE

## 2022-11-01 PROCEDURE — 99204 OFFICE O/P NEW MOD 45 MIN: CPT | Performed by: INTERNAL MEDICINE

## 2022-11-01 PROCEDURE — 3079F DIAST BP 80-89 MM HG: CPT | Performed by: INTERNAL MEDICINE

## 2022-11-01 PROCEDURE — 3008F BODY MASS INDEX DOCD: CPT | Performed by: INTERNAL MEDICINE

## 2022-11-01 RX ORDER — CYCLOBENZAPRINE HCL 10 MG
10 TABLET ORAL 3 TIMES DAILY PRN
COMMUNITY

## 2022-11-01 RX ORDER — CETIRIZINE HYDROCHLORIDE 10 MG/1
10 TABLET ORAL DAILY
COMMUNITY

## 2022-11-02 ENCOUNTER — OFFICE VISIT (OUTPATIENT)
Dept: FAMILY MEDICINE CLINIC | Facility: CLINIC | Age: 52
End: 2022-11-02
Payer: MEDICAID

## 2022-11-02 VITALS
HEIGHT: 66 IN | RESPIRATION RATE: 18 BRPM | WEIGHT: 215 LBS | SYSTOLIC BLOOD PRESSURE: 132 MMHG | DIASTOLIC BLOOD PRESSURE: 84 MMHG | BODY MASS INDEX: 34.55 KG/M2 | HEART RATE: 84 BPM

## 2022-11-02 DIAGNOSIS — F43.10 PTSD (POST-TRAUMATIC STRESS DISORDER): ICD-10-CM

## 2022-11-02 DIAGNOSIS — Z12.31 SCREENING MAMMOGRAM FOR BREAST CANCER: ICD-10-CM

## 2022-11-02 DIAGNOSIS — I10 ESSENTIAL HYPERTENSION: Primary | ICD-10-CM

## 2022-11-02 PROCEDURE — 3008F BODY MASS INDEX DOCD: CPT | Performed by: FAMILY MEDICINE

## 2022-11-02 PROCEDURE — 3079F DIAST BP 80-89 MM HG: CPT | Performed by: FAMILY MEDICINE

## 2022-11-02 PROCEDURE — 99214 OFFICE O/P EST MOD 30 MIN: CPT | Performed by: FAMILY MEDICINE

## 2022-11-02 PROCEDURE — 3075F SYST BP GE 130 - 139MM HG: CPT | Performed by: FAMILY MEDICINE

## 2022-11-02 RX ORDER — METOPROLOL SUCCINATE 50 MG/1
50 TABLET, EXTENDED RELEASE ORAL DAILY
Qty: 30 TABLET | Refills: 1 | Status: SHIPPED | OUTPATIENT
Start: 2022-11-02

## 2022-11-21 VITALS — BODY MASS INDEX: 34.55 KG/M2 | WEIGHT: 215 LBS | HEIGHT: 66 IN

## 2022-11-21 RX ORDER — SODIUM CHLORIDE 9 MG/ML
INJECTION, SOLUTION INTRAVENOUS CONTINUOUS
Status: CANCELLED | OUTPATIENT
Start: 2022-11-21

## 2022-11-25 DIAGNOSIS — I10 ESSENTIAL HYPERTENSION: ICD-10-CM

## 2022-11-25 RX ORDER — METOPROLOL SUCCINATE 50 MG/1
50 TABLET, EXTENDED RELEASE ORAL DAILY
Qty: 30 TABLET | Refills: 1 | OUTPATIENT
Start: 2022-11-25

## 2022-11-27 DIAGNOSIS — F43.10 PTSD (POST-TRAUMATIC STRESS DISORDER): ICD-10-CM

## 2022-11-28 RX ORDER — HYDROXYZINE HYDROCHLORIDE 25 MG/1
25 TABLET, FILM COATED ORAL EVERY 8 HOURS PRN
Qty: 30 TABLET | Refills: 0 | Status: SHIPPED | OUTPATIENT
Start: 2022-11-28

## 2022-11-28 NOTE — TELEPHONE ENCOUNTER
Please review; protocol failed. Or has no protocol    Requested Prescriptions   Pending Prescriptions Disp Refills    HYDROXYZINE 25 MG Oral Tab [Pharmacy Med Name: HYDROXYZINE HCL 25 MG TABLET] 30 tablet 0     Sig: Take 1 tablet (25 mg total) by mouth every 8 (eight) hours as needed for Anxiety (and sleep).        There is no refill protocol information for this order           Recent Outpatient Visits              3 weeks ago Essential hypertension    [address] [facility] Gino Britton MD    Office Visit    3 weeks ago Pelvic pain    [address] [facility], Barbara Jackson MD    Office Visit    1 month ago Chronic deep vein thrombosis (DVT) of distal vein of left lower extremity [facility] Hematology Oncology Nubia Barillas MD    Office Visit    2 months ago Lower extremity edema    [address] [facility] Gino Britton MD    Office Visit    1 year ago Pain of upper abdomen    Gastroenterology - Smith Brito MD    Office Visit           Future Appointments         Provider Department Appt Notes    In 3 days [address], [address]    In 4 days Ag Sol MD; [facility] Interventional Suites IVC filter removal;HP scanned;Covid 12/1 [facility]  Needs     In 1 week [facility] QA SLS CONSENT    In 2 weeks Gino Britton MD [facility]

## 2022-11-30 RX ORDER — SODIUM CHLORIDE 9 MG/ML
INJECTION, SOLUTION INTRAVENOUS CONTINUOUS
Status: CANCELLED | OUTPATIENT
Start: 2022-11-30

## 2022-12-01 ENCOUNTER — LAB ENCOUNTER (OUTPATIENT)
Dept: LAB | Facility: HOSPITAL | Age: 52
End: 2022-12-01
Attending: RADIOLOGY
Payer: MEDICAID

## 2022-12-01 ENCOUNTER — HOSPITAL ENCOUNTER (OUTPATIENT)
Dept: INTERVENTIONAL RADIOLOGY/VASCULAR | Facility: HOSPITAL | Age: 52
Discharge: HOME | End: 2022-12-01
Attending: RADIOLOGY
Payer: MEDICAID

## 2022-12-01 VITALS — BODY MASS INDEX: 34.55 KG/M2 | WEIGHT: 215 LBS | HEIGHT: 66 IN

## 2022-12-01 DIAGNOSIS — Z01.818 PRE-OP TESTING: ICD-10-CM

## 2022-12-01 DIAGNOSIS — Z01.818 PRE-OP TESTING: Primary | ICD-10-CM

## 2022-12-01 LAB — SARS-COV-2 RNA RESP QL NAA+PROBE: NOT DETECTED

## 2022-12-02 ENCOUNTER — HOSPITAL ENCOUNTER (OUTPATIENT)
Dept: INTERVENTIONAL RADIOLOGY/VASCULAR | Facility: HOSPITAL | Age: 52
Discharge: HOME | End: 2022-12-02
Attending: RADIOLOGY
Payer: MEDICAID

## 2022-12-02 ENCOUNTER — LAB ENCOUNTER (OUTPATIENT)
Dept: LAB | Facility: HOSPITAL | Age: 52
End: 2022-12-02
Attending: INTERNAL MEDICINE
Payer: MEDICAID

## 2022-12-02 DIAGNOSIS — A04.8 HELICOBACTER PYLORI (H. PYLORI) INFECTION: ICD-10-CM

## 2022-12-02 PROCEDURE — 87338 HPYLORI STOOL AG IA: CPT

## 2022-12-05 ENCOUNTER — HOSPITAL ENCOUNTER (OUTPATIENT)
Dept: INTERVENTIONAL RADIOLOGY/VASCULAR | Facility: HOSPITAL | Age: 52
Discharge: HOME | End: 2022-12-05
Attending: RADIOLOGY | Admitting: RADIOLOGY
Payer: MEDICAID

## 2022-12-05 VITALS
WEIGHT: 215 LBS | TEMPERATURE: 97 F | BODY MASS INDEX: 34.55 KG/M2 | OXYGEN SATURATION: 97 % | RESPIRATION RATE: 13 BRPM | DIASTOLIC BLOOD PRESSURE: 100 MMHG | SYSTOLIC BLOOD PRESSURE: 174 MMHG | HEIGHT: 66 IN | HEART RATE: 54 BPM

## 2022-12-05 DIAGNOSIS — I82.409 DVT (DEEP VENOUS THROMBOSIS) (HCC): ICD-10-CM

## 2022-12-05 LAB
H PYLORI AG STL QL IA: NEGATIVE
SARS-COV-2 RNA RESP QL NAA+PROBE: NOT DETECTED

## 2022-12-05 PROCEDURE — 36415 COLL VENOUS BLD VENIPUNCTURE: CPT

## 2022-12-05 PROCEDURE — 36010 PLACE CATHETER IN VEIN: CPT

## 2022-12-05 PROCEDURE — 06PY3DZ REMOVAL OF INTRALUMINAL DEVICE FROM LOWER VEIN, PERCUTANEOUS APPROACH: ICD-10-PCS | Performed by: RADIOLOGY

## 2022-12-05 PROCEDURE — 37193 REM ENDOVAS VENA CAVA FILTER: CPT

## 2022-12-05 PROCEDURE — B5191ZZ FLUOROSCOPY OF INFERIOR VENA CAVA USING LOW OSMOLAR CONTRAST: ICD-10-PCS | Performed by: RADIOLOGY

## 2022-12-05 PROCEDURE — 99152 MOD SED SAME PHYS/QHP 5/>YRS: CPT

## 2022-12-05 RX ORDER — LOSARTAN POTASSIUM 50 MG/1
50 TABLET ORAL DAILY
COMMUNITY
Start: 2022-11-20

## 2022-12-05 RX ORDER — MIDAZOLAM HYDROCHLORIDE 1 MG/ML
INJECTION INTRAMUSCULAR; INTRAVENOUS
Status: COMPLETED
Start: 2022-12-05 | End: 2022-12-05

## 2022-12-05 RX ORDER — LIDOCAINE HYDROCHLORIDE 20 MG/ML
INJECTION, SOLUTION EPIDURAL; INFILTRATION; INTRACAUDAL; PERINEURAL
Status: COMPLETED
Start: 2022-12-05 | End: 2022-12-05

## 2022-12-05 RX ORDER — SODIUM CHLORIDE 9 MG/ML
INJECTION, SOLUTION INTRAVENOUS CONTINUOUS
Status: DISCONTINUED | OUTPATIENT
Start: 2022-12-05 | End: 2022-12-05

## 2022-12-05 RX ADMIN — SODIUM CHLORIDE: 9 INJECTION, SOLUTION INTRAVENOUS at 07:30:00

## 2022-12-05 NOTE — DISCHARGE INSTRUCTIONS
Pr-14  4.2  [phone]     Patient Name:  Lea Sanders    Procedure:  IVC Filter removal    Site Care: Remove your band-aid or dressing in 24 hours. Gently wash area with soap and water. Activity/Diet  No heavy lifting or strenuous activity for 48 hours. Drink plenty of fluids, unless you have otherwise been told to restrict your fluid intake. Do not drink alcohol for 24 hours. Do not drive,  operate heavy machinery, make important decisions or sign legal documents today. Medications: Take acetaminophen if needed for pain. Do not exceed 4000mg of acetaminophen in a 24-hour period. , Do not take blood thinners, aspirin, or Plavix for 24 hours. , and Make no changes to your existing medications. Contact Interventional Radiology at [phone] if you have severe/unrelieved pain, fever, chills, dizziness/lightheadedness, or drainage/bleeding from your incision site.

## 2022-12-16 ENCOUNTER — TELEPHONE (OUTPATIENT)
Dept: FAMILY MEDICINE CLINIC | Facility: CLINIC | Age: 52
End: 2022-12-16

## 2022-12-16 ENCOUNTER — TELEPHONE (OUTPATIENT)
Facility: CLINIC | Age: 52
End: 2022-12-16

## 2022-12-16 NOTE — TELEPHONE ENCOUNTER
Dr. Charito Bah,    Contacted patient and verified . Informed her h. Pylori test was negative. Patient reported she is still having lower abdominal pain, notes had IVC filter removed. She also has been having bleeding when wiping for the past 3 months. Has to clean her self about 3 times a day, attributes this to her hemorrhoids. She denies lightheadedness/dizziness, chest pain, sob. The patient is on eliquis. The patient also wanted to make an appt with you sooner. Reviewed hemorrhoid management recommendations. Advised ED if symtoms worsens. Please advise, thank you.

## 2022-12-16 NOTE — TELEPHONE ENCOUNTER
[address], [place] [address], [place]    Condition update:  She missed her physical with Dr Eloise Chatman on 12/02/2022. IVC Filter removed by Dr Gill Prater 12/02/2022. Reports having pain in right shoulder. Also in neck. Had to go back to work and trying not to do a lot of heavy lifting. Denies fever. Able to turn her head side to side, up and down. 3 days after procedure skin was red and hot but not anymore. Denies chest pain or shortness of breath. \"There was a cap to take out but she did that in the shower as the surgeon had instructed. \"  Advised:  Go to Immediate Care if she is having pain now. She wanted to make follow up appointment with Dr Eloise Chatman. Declines alternate provider. Appointment was scheduled.   Future Appointments   Date Time Provider Kendall Villagran   1/3/2023  5:00 PM [facility] RM[facility] [address] [facility]   1/4/2023  2:45 PM Gay Russo MD [facility] [place]

## 2022-12-19 NOTE — TELEPHONE ENCOUNTER
used [id number] patient and confirmed appt for 1/3 at 10am. Location/date/time details provided.     Your appointments     Date & Time Appointment Department [facility])    Jan 03, 2023 10:00 AM CST Follow Up Visit with Dayami Mcgowan MD [address] [place], [address], [address] ([address])

## 2022-12-19 NOTE — TELEPHONE ENCOUNTER
Can offer appointment 1/3/22    Thanks    Dorien Mcburney, MD  [address] - Gastroenterology  12/19/2022  3:50 PM

## 2023-01-03 ENCOUNTER — HOSPITAL ENCOUNTER (OUTPATIENT)
Dept: MAMMOGRAPHY | Facility: HOSPITAL | Age: 53
Discharge: HOME | End: 2023-01-03
Attending: FAMILY MEDICINE
Payer: MEDICAID

## 2023-01-03 ENCOUNTER — OFFICE VISIT (OUTPATIENT)
Dept: GASTROENTEROLOGY | Facility: CLINIC | Age: 53
End: 2023-01-03
Payer: MEDICAID

## 2023-01-03 ENCOUNTER — TELEPHONE (OUTPATIENT)
Dept: GASTROENTEROLOGY | Facility: CLINIC | Age: 53
End: 2023-01-03

## 2023-01-03 VITALS
HEART RATE: 64 BPM | HEIGHT: 66 IN | SYSTOLIC BLOOD PRESSURE: 148 MMHG | WEIGHT: 216 LBS | BODY MASS INDEX: 34.72 KG/M2 | DIASTOLIC BLOOD PRESSURE: 89 MMHG

## 2023-01-03 DIAGNOSIS — K62.5 RECTAL BLEEDING: Primary | ICD-10-CM

## 2023-01-03 DIAGNOSIS — K62.89 RECTAL PAIN: ICD-10-CM

## 2023-01-03 DIAGNOSIS — Z12.31 SCREENING MAMMOGRAM FOR BREAST CANCER: ICD-10-CM

## 2023-01-03 PROCEDURE — 3079F DIAST BP 80-89 MM HG: CPT | Performed by: INTERNAL MEDICINE

## 2023-01-03 PROCEDURE — 99214 OFFICE O/P EST MOD 30 MIN: CPT | Performed by: INTERNAL MEDICINE

## 2023-01-03 PROCEDURE — 3077F SYST BP >= 140 MM HG: CPT | Performed by: INTERNAL MEDICINE

## 2023-01-03 PROCEDURE — 3008F BODY MASS INDEX DOCD: CPT | Performed by: INTERNAL MEDICINE

## 2023-01-03 NOTE — TELEPHONE ENCOUNTER
Spoke with Dr. Diony Yates who ok'd IV sedation with patient. He updated his notes to the following:  \"flexible sigmoidoscopy with MAC or IV sedation\"    Surgical case change request sent to Endo.

## 2023-01-03 NOTE — TELEPHONE ENCOUNTER
Scheduled for:  Flexible sigmoidoscopy [address]  Provider Name:  Dr. Duke Mcgowan  Date:  1/20/2023  Location:  [facility]  Sedation:  MAC  Time:  8:15 am, arrival 7:15 am  Prep:  Fleet enemas  Meds/Allergies Reconciled?:  Physician reviewed  Diagnosis with codes:  Rectal bleeding K62.5; Rectal pain K62.89  Was patient informed to call insurance with codes (Y/N):  Yes  Referral sent?:  Referral was sent at the time of electronic surgical scheduling. [address] [facility] or [address] [place] [address] notified?:  I sent an electronic request to Endo Scheduling and received a confirmation today. Medication Orders:  Hold Losartan the night before/morning of. \"no need to discontinue anti-coagulation\"  Misc Orders:  N/A     Further instructions given by staff:  Provided patient with English prep instruction sheet. I did not have a Israeli copy at the time. Discussed in detail with  the prep instructions. Informed patient I will mail her a copy of instructions in Israeli once I get a copy. She verbalized understanding.

## 2023-01-03 NOTE — TELEPHONE ENCOUNTER
Dr. Baldemar Caraballo, this patient needs to be either IV sedation or needs to be rescheduled. Please advise if IV is ok for her. Thank you!

## 2023-01-04 ENCOUNTER — OFFICE VISIT (OUTPATIENT)
Dept: FAMILY MEDICINE CLINIC | Facility: CLINIC | Age: 53
End: 2023-01-04
Payer: MEDICAID

## 2023-01-04 VITALS
WEIGHT: 213 LBS | DIASTOLIC BLOOD PRESSURE: 86 MMHG | SYSTOLIC BLOOD PRESSURE: 128 MMHG | BODY MASS INDEX: 34.23 KG/M2 | HEIGHT: 66 IN | HEART RATE: 87 BPM

## 2023-01-04 DIAGNOSIS — Z12.4 CERVICAL CANCER SCREENING: ICD-10-CM

## 2023-01-04 DIAGNOSIS — M79.18 MUSCULOSKELETAL PAIN: ICD-10-CM

## 2023-01-04 DIAGNOSIS — M51.36 DEGENERATIVE DISC DISEASE, LUMBAR: ICD-10-CM

## 2023-01-04 DIAGNOSIS — I87.009 POST-PHLEBITIC SYNDROME: ICD-10-CM

## 2023-01-04 DIAGNOSIS — I10 ESSENTIAL HYPERTENSION: ICD-10-CM

## 2023-01-04 DIAGNOSIS — J02.8 SORE THROAT (VIRAL): ICD-10-CM

## 2023-01-04 DIAGNOSIS — N63.20 MASS OF MULTIPLE SITES OF LEFT BREAST: ICD-10-CM

## 2023-01-04 DIAGNOSIS — M71.379 SYNOVIAL CYST OF ANKLE AND FOOT REGION: ICD-10-CM

## 2023-01-04 DIAGNOSIS — Z00.00 WELLNESS EXAMINATION: Primary | ICD-10-CM

## 2023-01-04 DIAGNOSIS — I87.1 MAY-THURNER SYNDROME: ICD-10-CM

## 2023-01-04 DIAGNOSIS — F43.10 PTSD (POST-TRAUMATIC STRESS DISORDER): ICD-10-CM

## 2023-01-04 DIAGNOSIS — Z12.31 ENCOUNTER FOR SCREENING MAMMOGRAM FOR MALIGNANT NEOPLASM OF BREAST: ICD-10-CM

## 2023-01-04 DIAGNOSIS — I82.5Z2 CHRONIC DEEP VEIN THROMBOSIS (DVT) OF DISTAL VEIN OF LEFT LOWER EXTREMITY (HCC): ICD-10-CM

## 2023-01-04 DIAGNOSIS — B97.89 SORE THROAT (VIRAL): ICD-10-CM

## 2023-01-04 PROCEDURE — 3079F DIAST BP 80-89 MM HG: CPT | Performed by: FAMILY MEDICINE

## 2023-01-04 PROCEDURE — 99396 PREV VISIT EST AGE 40-64: CPT | Performed by: FAMILY MEDICINE

## 2023-01-04 PROCEDURE — 3074F SYST BP LT 130 MM HG: CPT | Performed by: FAMILY MEDICINE

## 2023-01-04 PROCEDURE — 3008F BODY MASS INDEX DOCD: CPT | Performed by: FAMILY MEDICINE

## 2023-01-04 PROCEDURE — 99214 OFFICE O/P EST MOD 30 MIN: CPT | Performed by: FAMILY MEDICINE

## 2023-01-04 RX ORDER — ESCITALOPRAM OXALATE 10 MG/1
10 TABLET ORAL DAILY
Qty: 90 TABLET | Refills: 0 | Status: SHIPPED | OUTPATIENT
Start: 2023-01-04

## 2023-01-04 RX ORDER — HYDROXYZINE HYDROCHLORIDE 25 MG/1
25 TABLET, FILM COATED ORAL EVERY 8 HOURS PRN
Qty: 30 TABLET | Refills: 0 | Status: SHIPPED | OUTPATIENT
Start: 2023-01-04

## 2023-01-04 RX ORDER — METOPROLOL SUCCINATE 50 MG/1
50 TABLET, EXTENDED RELEASE ORAL DAILY
Qty: 30 TABLET | Refills: 1 | Status: SHIPPED | OUTPATIENT
Start: 2023-01-04

## 2023-01-05 LAB — HPV I/H RISK 1 DNA SPEC QL NAA+PROBE: NEGATIVE

## 2023-01-20 ENCOUNTER — HOSPITAL ENCOUNTER (OUTPATIENT)
Facility: HOSPITAL | Age: 53
Setting detail: HOSPITAL OUTPATIENT SURGERY
Discharge: HOME | End: 2023-01-20
Attending: INTERNAL MEDICINE | Admitting: INTERNAL MEDICINE
Payer: MEDICAID

## 2023-01-20 VITALS
HEIGHT: 66 IN | OXYGEN SATURATION: 97 % | BODY MASS INDEX: 31.98 KG/M2 | SYSTOLIC BLOOD PRESSURE: 130 MMHG | HEART RATE: 47 BPM | RESPIRATION RATE: 15 BRPM | WEIGHT: 199 LBS | DIASTOLIC BLOOD PRESSURE: 86 MMHG

## 2023-01-20 DIAGNOSIS — K62.89 RECTAL PAIN: ICD-10-CM

## 2023-01-20 DIAGNOSIS — K62.5 RECTAL BLEEDING: ICD-10-CM

## 2023-01-20 PROCEDURE — 0DBQ8ZX EXCISION OF ANUS, VIA NATURAL OR ARTIFICIAL OPENING ENDOSCOPIC, DIAGNOSTIC: ICD-10-PCS | Performed by: INTERNAL MEDICINE

## 2023-01-20 PROCEDURE — 45331 SIGMOIDOSCOPY AND BIOPSY: CPT | Performed by: INTERNAL MEDICINE

## 2023-01-20 PROCEDURE — G0500 MOD SEDAT ENDO SERVICE >5YRS: HCPCS | Performed by: INTERNAL MEDICINE

## 2023-01-20 RX ORDER — SODIUM CHLORIDE 0.9 % (FLUSH) 0.9 %
10 SYRINGE (ML) INJECTION AS NEEDED
Status: DISCONTINUED | OUTPATIENT
Start: 2023-01-20 | End: 2023-01-20

## 2023-01-20 RX ORDER — MIDAZOLAM HYDROCHLORIDE 1 MG/ML
1 INJECTION INTRAMUSCULAR; INTRAVENOUS EVERY 5 MIN PRN
Status: DISCONTINUED | OUTPATIENT
Start: 2023-01-20 | End: 2023-01-20

## 2023-01-20 RX ORDER — MIDAZOLAM HYDROCHLORIDE 1 MG/ML
INJECTION INTRAMUSCULAR; INTRAVENOUS
Status: DISCONTINUED | OUTPATIENT
Start: 2023-01-20 | End: 2023-01-20

## 2023-01-20 RX ORDER — SODIUM CHLORIDE, SODIUM LACTATE, POTASSIUM CHLORIDE, CALCIUM CHLORIDE 600; 310; 30; 20 MG/100ML; MG/100ML; MG/100ML; MG/100ML
INJECTION, SOLUTION INTRAVENOUS CONTINUOUS
Status: DISCONTINUED | OUTPATIENT
Start: 2023-01-20 | End: 2023-01-20

## 2023-01-20 NOTE — DISCHARGE INSTRUCTIONS
Home Care Instructions for Colonoscopy and/or Gastroscopy with Sedation    Diet:  - Resume your regular diet as tolerated unless otherwise instructed. - Start with light meals to minimize bloating.  - Do not drink alcohol today. Medication:  - If you have questions about resuming your normal medications, please contact your Primary Care Physician. Activities:  - Take it easy today. Do not return to work today. - Do not drive today. - Do not operate any machinery today (including kitchen equipment). Colonoscopy:  - You may notice some rectal \"spotting\" (a little blood on the toilet tissue) for a day or two after the exam. This is normal.  - If you experience any rectal bleeding (not spotting), persistent tenderness or sharp severe abdominal pains, oral temperature over 100 degrees Fahrenheit, light-headedness or dizziness, or any other problems, contact your doctor. **If unable to reach your doctor, please go to the [facility] Emergency Room**    - Your referring physician will receive a full report of your examination.  - If you do not hear from your doctor's office within two weeks of your biopsy, please call them for your results. You may be able to see your laboratory results in [facility] between 4 and 7 business days. In some cases, your physician may not have viewed the results before they are released to [address] If you have questions regarding your results contact the physician who ordered the test/exam by phone or via [address] by choosing \"Ask a Medical Question. \"

## 2023-01-25 ENCOUNTER — TELEPHONE (OUTPATIENT)
Dept: GASTROENTEROLOGY | Facility: CLINIC | Age: 53
End: 2023-01-25

## 2023-01-25 ENCOUNTER — HOSPITAL ENCOUNTER (OUTPATIENT)
Dept: MAMMOGRAPHY | Facility: HOSPITAL | Age: 53
Discharge: HOME | End: 2023-01-25
Attending: FAMILY MEDICINE
Payer: MEDICAID

## 2023-01-25 DIAGNOSIS — Z12.31 ENCOUNTER FOR SCREENING MAMMOGRAM FOR MALIGNANT NEOPLASM OF BREAST: ICD-10-CM

## 2023-01-25 DIAGNOSIS — N63.20 MASS OF MULTIPLE SITES OF LEFT BREAST: ICD-10-CM

## 2023-01-25 PROCEDURE — 76642 ULTRASOUND BREAST LIMITED: CPT | Performed by: FAMILY MEDICINE

## 2023-01-25 PROCEDURE — 77066 DX MAMMO INCL CAD BI: CPT | Performed by: FAMILY MEDICINE

## 2023-01-25 PROCEDURE — 77062 BREAST TOMOSYNTHESIS BI: CPT | Performed by: FAMILY MEDICINE

## 2023-01-25 NOTE — TELEPHONE ENCOUNTER
GI staff:    Please contact the patient with a phone     Please let her know the biopsies from her rectum were unremarkable. As we discussed her bleeding is likely related to hemorrhoids. Recommendations for her bleeding is increase fiber in the diet     She has asked me about her pains in the back, flanks and lower pelvis in November and in January and as I've discussed with her I don't believe is related to her gastrointestinal tract and have recommended she follow up with her primary care physician    Based on her outside colonoscopy from 2021 it is recommended to repeat her colonoscopy for 2031 and she can be recalled for colonoscopy then.     Thank you    Yi Mroan MD  [address] - Gastroenterology  1/25/2023  4:05 PM

## 2023-01-26 DIAGNOSIS — I10 ESSENTIAL HYPERTENSION: ICD-10-CM

## 2023-01-26 RX ORDER — METOPROLOL SUCCINATE 50 MG/1
50 TABLET, EXTENDED RELEASE ORAL EVERY MORNING
Qty: 90 TABLET | Refills: 1 | Status: SHIPPED | OUTPATIENT
Start: 2023-01-26

## 2023-01-26 NOTE — TELEPHONE ENCOUNTER
Refill passed per [employer], [facility] protocol. Requested Prescriptions   Pending Prescriptions Disp Refills    METOPROLOL SUCCINATE ER 50 MG Oral Tablet 24 Hr [Pharmacy Med Name: METOPROLOL SUCC ER 50 MG TAB] 30 tablet 1     Sig: TAKE 1 TABLET (50 MG TOTAL) BY MOUTH IN THE MORNING.        Hypertensive Medications Protocol Passed - 1/26/2023  8:32 AM        Passed - In person appointment in the past 12 or next 3 months     Recent Outpatient Visits              3 weeks ago Wellness examination    [address] [facility], [place] Yue Soto MD    Office Visit    3 weeks ago Rectal bleeding    [facility], [address], Roshan Jackson MD    Office Visit    2 months ago Essential hypertension    [address], [place] Anand June, Eddie Oquendo MD    Office Visit    2 months ago Pelvic pain    [facility], [address], Roshan Jackson MD    Office Visit    3 months ago Chronic deep vein thrombosis (DVT) of distal vein of left lower extremity [facility] AND [facility] Hematology Oncology Renu Cano MD    Office Visit          Future Appointments         Provider Department Appt Notes    In 1 month Yue Stoo MD [facility], [address], [place] 3 Month Follow Up               Passed - Last BP reading less than 140/90     BP Readings from Last 1 Encounters:  01/20/23 : 130/86              Passed - CMP or BMP in past 6 months     Recent Results (from the past 4392 hour(s))   Comp Metabolic Panel (14)    Collection Time: 10/11/22 11:52 AM   Result Value Ref Range    Glucose 95 70 - 99 mg/dL    Sodium 140 136 - 145 mmol/L    Potassium 3.9 3.5 - 5.1 mmol/L    Chloride 107 98 - 112 mmol/L    CO2 26.0 21.0 - 32.0 mmol/L    Anion Gap 7 0 - 18 mmol/L    BUN 12 7 - 18 mg/dL    Creatinine 0.86 0.55 - 1.02 mg/dL    Calcium, Total 9.6 8.5 - 10.1 mg/dL    Calculated Osmolality 290 275 - 295 mOsm/kg    eGFR-Cr 80 >=60 mL/min/1.73m2    AST 21 15 - 37 U/L    ALT 28 13 - 56 U/L    Alkaline Phosphatase 70 41 - 108 U/L    Bilirubin, Total 0.4 0.1 - 2.0 mg/dL    Total Protein 7.7 6.4 - 8.2 g/dL    Albumin 3.8 3.4 - 5.0 g/dL    Globulin  3.9 2.8 - 4.4 g/dL    A/G Ratio 1.0 1.0 - 2.0     *Note: Due to a large number of results and/or encounters for the requested time period, some results have not been displayed. A complete set of results can be found in Results Review.                Passed - In person appointment or virtual visit in the past 6 months     Recent Outpatient Visits              3 weeks ago Wellness examination    [address], [place] Elinore Slimmer, MD    Office Visit    3 weeks ago [facility] [address], Ying Jackson MD    Office Visit    2 months ago Essential hypertension    [address], [place] Earvin Hillock, Katherene Cover, MD    Office Visit    2 months ago [facility] [address], Ying Jackson MD    Office Visit    3 months ago Chronic deep vein thrombosis (DVT) of distal vein of left lower extremity [facility] Hematology Oncology Daya Moreno MD    Office Visit          Future Appointments         Provider Department Appt Notes    In 1 month [facility] [address], [place] 3 Month Follow Up               Passed - [facility] or [facility] > 50     GFR Evaluation  EGFRCR: 80 , resulted on 10/11/2022             Recent Outpatient Visits              3 weeks ago Wellness examination    [address], [place] Elinore Slimmer, MD    Office Visit    3 weeks ago Rectal bleeding    [address] [facility] Capri Erwin [facility]    Office Visit    2 months ago Essential hypertension [facility], [address], [place] Rj Rivas MD    Office Visit    2 months ago Pelvic pain    Alyssa Horvath MD    Office Visit    3 months ago Chronic deep vein thrombosis (DVT) of distal vein of left lower extremity [facility] Hematology Oncology Fritz Noel MD    Office Visit          Future Appointments         Provider Department Appt Notes    In 1 month Rj Rivas MD [address], [facility] [address] [place] [address], [place] 3 Month Follow Up

## 2023-01-31 ENCOUNTER — TELEPHONE (OUTPATIENT)
Dept: ORTHOPEDICS CLINIC | Facility: CLINIC | Age: 53
End: 2023-01-31

## 2023-01-31 DIAGNOSIS — M79.671 HEEL PAIN, BILATERAL: Primary | ICD-10-CM

## 2023-01-31 DIAGNOSIS — M79.672 HEEL PAIN, BILATERAL: Primary | ICD-10-CM

## 2023-01-31 NOTE — TELEPHONE ENCOUNTER
HEEL xrays ordered for both feet. Can both be scheduled. If any pain.symptoms beyond heels, please advise. Thank you!

## 2023-01-31 NOTE — TELEPHONE ENCOUNTER
NPT scheduled with Dr. Gregorio Zuniga for MAR feet bump on heels. No imaging in epic.      Future Appointments   Date Time Provider Kendall Murphyi   2/7/2023  1:00 PM Ashkan Diop MD PM&R [facility] OF THE [facility]   2/22/2023  3:15 PM Javier Motta., DPM [facility]O [facility]   3/1/2023  3:00 PM Luis Ochoa MD [facility] [place]

## 2023-02-01 ENCOUNTER — APPOINTMENT (OUTPATIENT)
Dept: GENERAL RADIOLOGY | Facility: HOSPITAL | Age: 53
End: 2023-02-01
Attending: EMERGENCY MEDICINE
Payer: MEDICAID

## 2023-02-01 ENCOUNTER — APPOINTMENT (OUTPATIENT)
Dept: CT IMAGING | Facility: HOSPITAL | Age: 53
End: 2023-02-01
Attending: EMERGENCY MEDICINE
Payer: MEDICAID

## 2023-02-01 ENCOUNTER — HOSPITAL ENCOUNTER (EMERGENCY)
Facility: HOSPITAL | Age: 53
Discharge: HOME | End: 2023-02-01
Attending: EMERGENCY MEDICINE
Payer: MEDICAID

## 2023-02-01 VITALS
OXYGEN SATURATION: 100 % | SYSTOLIC BLOOD PRESSURE: 130 MMHG | HEART RATE: 46 BPM | WEIGHT: 210 LBS | RESPIRATION RATE: 18 BRPM | TEMPERATURE: 98 F | BODY MASS INDEX: 33.75 KG/M2 | DIASTOLIC BLOOD PRESSURE: 84 MMHG | HEIGHT: 66.14 IN

## 2023-02-01 DIAGNOSIS — S09.90XA CLOSED HEAD INJURY, INITIAL ENCOUNTER: ICD-10-CM

## 2023-02-01 DIAGNOSIS — S80.02XA CONTUSION OF LEFT KNEE, INITIAL ENCOUNTER: ICD-10-CM
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
## 2023-02-01 DIAGNOSIS — S60.042A CONTUSION OF LEFT RING FINGER WITHOUT DAMAGE TO NAIL, INITIAL ENCOUNTER: ICD-10-CM

## 2023-02-01 PROCEDURE — 73562 X-RAY EXAM OF KNEE 3: CPT | Performed by: EMERGENCY MEDICINE

## 2023-02-01 PROCEDURE — 99284 EMERGENCY DEPT VISIT MOD MDM: CPT

## 2023-02-01 PROCEDURE — 73140 X-RAY EXAM OF FINGER(S): CPT | Performed by: EMERGENCY MEDICINE

## 2023-02-01 PROCEDURE — 73030 X-RAY EXAM OF SHOULDER: CPT | Performed by: EMERGENCY MEDICINE

## 2023-02-01 PROCEDURE — 73502 X-RAY EXAM HIP UNI 2-3 VIEWS: CPT | Performed by: EMERGENCY MEDICINE

## 2023-02-01 PROCEDURE — 70450 CT HEAD/BRAIN W/O DYE: CPT | Performed by: EMERGENCY MEDICINE

## 2023-02-01 PROCEDURE — 71101 X-RAY EXAM UNILAT RIBS/CHEST: CPT | Performed by: EMERGENCY MEDICINE

## 2023-02-01 RX ORDER — ACETAMINOPHEN AND CODEINE PHOSPHATE 300; 30 MG/1; MG/1
1 TABLET ORAL ONCE
Status: COMPLETED | OUTPATIENT
Start: 2023-02-01 | End: 2023-02-01

## 2023-02-01 NOTE — DISCHARGE INSTRUCTIONS
Follow-up with your primary care doctor within the next week for reassessment. Take Tylenol as needed for pain. Ice the areas of discomfort. Return for any new or worsening pain or any other concerning symptoms.

## 2023-02-01 NOTE — ED INITIAL ASSESSMENT (HPI)
Patient to ED with c/o fall. Left hand pain, head, and left knee pain and left hip pain after slipping on ice. No LOC, c/o dizziness. Patient is on eliquis.

## 2023-02-07 ENCOUNTER — OFFICE VISIT (OUTPATIENT)
Dept: PHYSICAL MEDICINE AND REHAB | Facility: CLINIC | Age: 53
End: 2023-02-07
Payer: MEDICAID

## 2023-02-07 ENCOUNTER — HOSPITAL ENCOUNTER (OUTPATIENT)
Dept: GENERAL RADIOLOGY | Facility: HOSPITAL | Age: 53
Discharge: HOME | End: 2023-02-07
Attending: PHYSICAL MEDICINE & REHABILITATION
Payer: MEDICAID

## 2023-02-07 VITALS
SYSTOLIC BLOOD PRESSURE: 110 MMHG | DIASTOLIC BLOOD PRESSURE: 80 MMHG | HEIGHT: 66 IN | WEIGHT: 210 LBS | BODY MASS INDEX: 33.75 KG/M2

## 2023-02-07 DIAGNOSIS — M17.10 PRIMARY OSTEOARTHRITIS OF KNEE, UNSPECIFIED LATERALITY: ICD-10-CM

## 2023-02-07 DIAGNOSIS — M25.462 BILATERAL KNEE EFFUSIONS: ICD-10-CM

## 2023-02-07 DIAGNOSIS — M22.2X9 PATELLOFEMORAL PAIN SYNDROME, UNSPECIFIED LATERALITY: Primary | ICD-10-CM

## 2023-02-07 DIAGNOSIS — I82.502 LEG DVT (DEEP VENOUS THROMBOEMBOLISM), CHRONIC, LEFT (HCC): ICD-10-CM

## 2023-02-07 DIAGNOSIS — M25.461 BILATERAL KNEE EFFUSIONS: ICD-10-CM

## 2023-02-07 DIAGNOSIS — D17.20 LIPOMA OF LOWER EXTREMITY, UNSPECIFIED LATERALITY: ICD-10-CM

## 2023-02-07 DIAGNOSIS — M71.21 SYNOVIAL CYST OF RIGHT POPLITEAL SPACE: ICD-10-CM

## 2023-02-07 DIAGNOSIS — M22.2X9 PATELLOFEMORAL PAIN SYNDROME, UNSPECIFIED LATERALITY: ICD-10-CM

## 2023-02-07 DIAGNOSIS — I87.1 MAY-THURNER SYNDROME: Chronic | ICD-10-CM

## 2023-02-07 DIAGNOSIS — I87.2 PERIPHERAL VENOUS INSUFFICIENCY: ICD-10-CM

## 2023-02-07 PROCEDURE — 99244 OFF/OP CNSLTJ NEW/EST MOD 40: CPT | Performed by: PHYSICAL MEDICINE & REHABILITATION

## 2023-02-07 PROCEDURE — 3008F BODY MASS INDEX DOCD: CPT | Performed by: PHYSICAL MEDICINE & REHABILITATION

## 2023-02-07 PROCEDURE — 73564 X-RAY EXAM KNEE 4 OR MORE: CPT | Performed by: PHYSICAL MEDICINE & REHABILITATION

## 2023-02-07 PROCEDURE — 3079F DIAST BP 80-89 MM HG: CPT | Performed by: PHYSICAL MEDICINE & REHABILITATION

## 2023-02-07 PROCEDURE — 3074F SYST BP LT 130 MM HG: CPT | Performed by: PHYSICAL MEDICINE & REHABILITATION

## 2023-02-07 NOTE — PATIENT INSTRUCTIONS
1) My office will call you to schedule the RIGHT baker cyst aspiration and knee CSI once the procedure is approved by your insurance carrier. 2) My office will call you to schedule the LEFT knee CSI under ultrasound guidance once the procedure is approved by your insurance carrier. 3) Please begin physical therapy as soon as possible. 4) Look into [facility], [facility], [place], or [facility] shoes. You can purchase these at [employer] or [employer] [address] [employer] in front fo the [address]  5) Please get X-rays of the RIGHT knee today on your way out. 6)  Ice 20 minutes at a time 3-4 times per day  7) Tylenol 500-1000 mg every 6-8 hours as needed for pain. No more than 3000 mg daily.

## 2023-02-08 ENCOUNTER — TELEPHONE (OUTPATIENT)
Dept: PHYSICAL MEDICINE AND REHAB | Facility: CLINIC | Age: 53
End: 2023-02-08

## 2023-02-08 NOTE — TELEPHONE ENCOUNTER
used #[id number]    Contacted patient and verified . Reviewed note below with patient which she verbalized understanding of. All questions answered. Health maintenance updated. Last colonoscopy done 2021. 10 year recall placed into Pt Outreach, next due on 2031 per Dr. Oscar Beard.

## 2023-02-09 NOTE — TELEPHONE ENCOUNTER
Patient is eligible for Medicaid Immigrant Adult Program    Per Medicaid Guidelines -no authorization is required for LEFT knee CSi under ultrasound guidance CPT 57989,      Status: Authorization is not required however may be subject to review once claim is submitted-Covered Benefit     AND    Per Medicaid Guidelines -no authorization is required for RIGHT baker cyst aspiration and knee CSI under ultrasound guidance CPT 90646,      Status: Authorization is not required however may be subject to review once claim is submitted-Covered Benefit     LMTCB-to schedule injections

## 2023-02-10 ENCOUNTER — TELEPHONE (OUTPATIENT)
Dept: PHYSICAL MEDICINE AND REHAB | Facility: CLINIC | Age: 53
End: 2023-02-10

## 2023-02-10 NOTE — TELEPHONE ENCOUNTER
Contacted patient to schedule RIGHT Baker's cyst aspiration and knee corticosteroid injection as well as a left knee corticosteroid injection under ultrasound guidance however patient is Kenyan speaking and prefers someone who speaks Kenyan to schedule appt.   Also need to confirm sooner availability with Dr. Galindo Gonzlaes

## 2023-02-12 DIAGNOSIS — F43.10 PTSD (POST-TRAUMATIC STRESS DISORDER): ICD-10-CM

## 2023-02-13 RX ORDER — HYDROXYZINE HYDROCHLORIDE 25 MG/1
25 TABLET, FILM COATED ORAL EVERY 8 HOURS PRN
Qty: 30 TABLET | Refills: 0 | Status: SHIPPED | OUTPATIENT
Start: 2023-02-13

## 2023-02-13 NOTE — TELEPHONE ENCOUNTER
Please review. Protocol failed / No Protocol. Requested Prescriptions   Pending Prescriptions Disp Refills    HYDROXYZINE 25 MG Oral Tab [Pharmacy Med Name: HYDROXYZINE HCL 25 MG TABLET] 30 tablet 0     Sig: Take 1 tablet (25 mg total) by mouth every 8 (eight) hours as needed for Anxiety (and sleep).        There is no refill protocol information for this order

## 2023-02-14 ENCOUNTER — OFFICE VISIT (OUTPATIENT)
Dept: PHYSICAL MEDICINE AND REHAB | Facility: CLINIC | Age: 53
End: 2023-02-14
Payer: MEDICAID

## 2023-02-14 DIAGNOSIS — M71.21 SYNOVIAL CYST OF RIGHT POPLITEAL SPACE: ICD-10-CM

## 2023-02-14 DIAGNOSIS — M25.461 BILATERAL KNEE EFFUSIONS: ICD-10-CM

## 2023-02-14 DIAGNOSIS — M17.10 PRIMARY OSTEOARTHRITIS OF KNEE, UNSPECIFIED LATERALITY: ICD-10-CM

## 2023-02-14 DIAGNOSIS — M25.462 BILATERAL KNEE EFFUSIONS: ICD-10-CM

## 2023-02-14 DIAGNOSIS — M22.2X9 PATELLOFEMORAL PAIN SYNDROME, UNSPECIFIED LATERALITY: Primary | ICD-10-CM

## 2023-02-14 PROCEDURE — 20611 DRAIN/INJ JOINT/BURSA W/US: CPT | Performed by: PHYSICAL MEDICINE & REHABILITATION

## 2023-02-14 RX ORDER — LIDOCAINE HYDROCHLORIDE 10 MG/ML
22 INJECTION, SOLUTION INFILTRATION; PERINEURAL ONCE
Status: COMPLETED | OUTPATIENT
Start: 2023-02-14 | End: 2023-02-14

## 2023-02-14 RX ORDER — TRIAMCINOLONE ACETONIDE 40 MG/ML
80 INJECTION, SUSPENSION INTRA-ARTICULAR; INTRAMUSCULAR ONCE
Status: COMPLETED | OUTPATIENT
Start: 2023-02-14 | End: 2023-02-14

## 2023-02-14 RX ADMIN — TRIAMCINOLONE ACETONIDE 80 MG: 40 INJECTION, SUSPENSION INTRA-ARTICULAR; INTRAMUSCULAR at 09:18:00

## 2023-02-14 RX ADMIN — LIDOCAINE HYDROCHLORIDE 22 ML: 10 INJECTION, SOLUTION INFILTRATION; PERINEURAL at 09:17:00

## 2023-02-14 NOTE — PATIENT INSTRUCTIONS
Post Injection Instructions     Please do not do anything strenuous over the next two days (if you had a knee injection do not walk more than 2 city blocks, do not attend any aerobic classes, do not run, no heavy lifting, no prolong standing). You may resume your day to day activities after your injection. You may experience some mild amount of swelling after the procedure. Please ice your joint that was injected at least 5-6 times a day (15 minutes) for two days after (this will help prevent worsening pain that sometimes occurs after an injection). Only take tylenol if needed for pain for the first few days. Watch for signs of infection which include redness, warmth, worsening pain, fevers or chills. If you develop any of these signs call the office immediately at [phone]    Everyone responds differently to injections, but you can expect your peak effects a few weeks after your last injection. Shayy Zuluaga.  Stacie Gonzales MD  Physical Medicine and Rehabilitation/Sports Medicine  MEDICAL CENTER [facility]

## 2023-02-18 ENCOUNTER — HOSPITAL ENCOUNTER (EMERGENCY)
Facility: HOSPITAL | Age: 53
Discharge: HOME | End: 2023-02-19
Attending: EMERGENCY MEDICINE
Payer: MEDICAID

## 2023-02-18 VITALS
WEIGHT: 210 LBS | HEART RATE: 68 BPM | DIASTOLIC BLOOD PRESSURE: 92 MMHG | OXYGEN SATURATION: 95 % | BODY MASS INDEX: 34 KG/M2 | RESPIRATION RATE: 18 BRPM | SYSTOLIC BLOOD PRESSURE: 139 MMHG | TEMPERATURE: 97 F

## 2023-02-18 DIAGNOSIS — R10.30 LOWER ABDOMINAL PAIN: Primary | ICD-10-CM

## 2023-02-18 DIAGNOSIS — K59.00 CONSTIPATION, UNSPECIFIED CONSTIPATION TYPE: ICD-10-CM

## 2023-02-18 LAB
ALBUMIN SERPL-MCNC: 3.6 G/DL (ref 3.4–5)
ALBUMIN/GLOB SERPL: 0.9 {RATIO} (ref 1–2)
ALP LIVER SERPL-CCNC: 85 U/L
ALT SERPL-CCNC: 32 U/L
ANION GAP SERPL CALC-SCNC: 2 MMOL/L (ref 0–18)
AST SERPL-CCNC: 32 U/L (ref 15–37)
B-HCG UR QL: NEGATIVE
BASOPHILS # BLD AUTO: 0.07 X10(3) UL (ref 0–0.2)
BASOPHILS NFR BLD AUTO: 0.9 %
BILIRUB SERPL-MCNC: 0.4 MG/DL (ref 0.1–2)
BILIRUB UR QL STRIP.AUTO: NEGATIVE
BUN BLD-MCNC: 12 MG/DL (ref 7–18)
CALCIUM BLD-MCNC: 9 MG/DL (ref 8.5–10.1)
CHLORIDE SERPL-SCNC: 109 MMOL/L (ref 98–112)
CLARITY UR REFRACT.AUTO: CLEAR
CO2 SERPL-SCNC: 28 MMOL/L (ref 21–32)
COLOR UR AUTO: YELLOW
CREAT BLD-MCNC: 0.92 MG/DL
EOSINOPHIL # BLD AUTO: 0.23 X10(3) UL (ref 0–0.7)
EOSINOPHIL NFR BLD AUTO: 3 %
ERYTHROCYTE [DISTWIDTH] IN BLOOD BY AUTOMATED COUNT: 12.8 %
GFR SERPLBLD BASED ON 1.73 SQ M-ARVRAT: 74 ML/MIN/1.73M2 (ref 60–?)
GLOBULIN PLAS-MCNC: 3.8 G/DL (ref 2.8–4.4)
GLUCOSE BLD-MCNC: 107 MG/DL (ref 70–99)
GLUCOSE UR STRIP.AUTO-MCNC: NEGATIVE MG/DL
HCT VFR BLD AUTO: 42.1 %
HGB BLD-MCNC: 13.9 G/DL
IMM GRANULOCYTES # BLD AUTO: 0.06 X10(3) UL (ref 0–1)
IMM GRANULOCYTES NFR BLD: 0.8 %
KETONES UR STRIP.AUTO-MCNC: NEGATIVE MG/DL
LEUKOCYTE ESTERASE UR QL STRIP.AUTO: NEGATIVE
LIPASE SERPL-CCNC: 148 U/L (ref 73–393)
LIPASE SERPL-CCNC: 38 U/L (ref 13–75)
LYMPHOCYTES # BLD AUTO: 2.43 X10(3) UL (ref 1–4)
LYMPHOCYTES NFR BLD AUTO: 31.3 %
MCH RBC QN AUTO: 28.3 PG (ref 26–34)
MCHC RBC AUTO-ENTMCNC: 33 G/DL (ref 31–37)
MCV RBC AUTO: 85.6 FL
MONOCYTES # BLD AUTO: 0.45 X10(3) UL (ref 0.1–1)
MONOCYTES NFR BLD AUTO: 5.8 %
NEUTROPHILS # BLD AUTO: 4.53 X10 (3) UL (ref 1.5–7.7)
NEUTROPHILS # BLD AUTO: 4.53 X10(3) UL (ref 1.5–7.7)
NEUTROPHILS NFR BLD AUTO: 58.2 %
NITRITE UR QL STRIP.AUTO: NEGATIVE
OSMOLALITY SERPL CALC.SUM OF ELEC: 288 MOSM/KG (ref 275–295)
PH UR STRIP.AUTO: 5 [PH] (ref 5–8)
PLATELET # BLD AUTO: 218 10(3)UL (ref 150–450)
POTASSIUM SERPL-SCNC: 4.2 MMOL/L (ref 3.5–5.1)
PROT SERPL-MCNC: 7.4 G/DL (ref 6.4–8.2)
PROT UR STRIP.AUTO-MCNC: NEGATIVE MG/DL
RBC # BLD AUTO: 4.92 X10(6)UL
RBC UR QL AUTO: NEGATIVE
SODIUM SERPL-SCNC: 139 MMOL/L (ref 136–145)
SP GR UR STRIP.AUTO: 1.02 (ref 1–1.03)
UROBILINOGEN UR STRIP.AUTO-MCNC: <2 MG/DL
WBC # BLD AUTO: 7.8 X10(3) UL (ref 4–11)

## 2023-02-18 PROCEDURE — 83690 ASSAY OF LIPASE: CPT | Performed by: EMERGENCY MEDICINE

## 2023-02-18 PROCEDURE — 96361 HYDRATE IV INFUSION ADD-ON: CPT

## 2023-02-18 PROCEDURE — 99284 EMERGENCY DEPT VISIT MOD MDM: CPT

## 2023-02-18 PROCEDURE — 80053 COMPREHEN METABOLIC PANEL: CPT | Performed by: EMERGENCY MEDICINE

## 2023-02-18 PROCEDURE — 81001 URINALYSIS AUTO W/SCOPE: CPT | Performed by: EMERGENCY MEDICINE

## 2023-02-18 PROCEDURE — 85025 COMPLETE CBC W/AUTO DIFF WBC: CPT | Performed by: EMERGENCY MEDICINE

## 2023-02-18 PROCEDURE — 99285 EMERGENCY DEPT VISIT HI MDM: CPT

## 2023-02-18 PROCEDURE — 81025 URINE PREGNANCY TEST: CPT

## 2023-02-18 RX ORDER — MORPHINE SULFATE 4 MG/ML
4 INJECTION, SOLUTION INTRAMUSCULAR; INTRAVENOUS ONCE
Status: COMPLETED | OUTPATIENT
Start: 2023-02-18 | End: 2023-02-19

## 2023-02-19 ENCOUNTER — APPOINTMENT (OUTPATIENT)
Dept: CT IMAGING | Facility: HOSPITAL | Age: 53
End: 2023-02-19
Attending: EMERGENCY MEDICINE
Payer: MEDICAID

## 2023-02-19 PROCEDURE — 74177 CT ABD & PELVIS W/CONTRAST: CPT | Performed by: EMERGENCY MEDICINE

## 2023-02-19 PROCEDURE — 96374 THER/PROPH/DIAG INJ IV PUSH: CPT

## 2023-02-19 RX ORDER — POLYETHYLENE GLYCOL 3350 17 G/17G
17 POWDER, FOR SOLUTION ORAL DAILY PRN
Qty: 30 EACH | Refills: 0 | Status: SHIPPED | OUTPATIENT
Start: 2023-02-19 | End: 2023-03-21

## 2023-02-19 RX ORDER — DICYCLOMINE HCL 20 MG
20 TABLET ORAL EVERY 8 HOURS PRN
Qty: 30 TABLET | Refills: 0 | Status: SHIPPED | OUTPATIENT
Start: 2023-02-19 | End: 2023-03-21

## 2023-02-19 NOTE — DISCHARGE INSTRUCTIONS
Follow-up with your primary care physician regarding the IVC filter that was seen on CT that has a metallic foreign body in the filter previous location

## 2023-02-22 ENCOUNTER — OFFICE VISIT (OUTPATIENT)
Dept: ORTHOPEDICS CLINIC | Facility: CLINIC | Age: 53
End: 2023-02-22
Payer: MEDICAID

## 2023-02-22 ENCOUNTER — HOSPITAL ENCOUNTER (OUTPATIENT)
Dept: GENERAL RADIOLOGY | Age: 53
Discharge: HOME | End: 2023-02-22
Attending: PODIATRIST
Payer: MEDICAID

## 2023-02-22 VITALS — BODY MASS INDEX: 33.75 KG/M2 | WEIGHT: 210 LBS | HEIGHT: 66.14 IN

## 2023-02-22 DIAGNOSIS — M79.672 HEEL PAIN, BILATERAL: ICD-10-CM

## 2023-02-22 DIAGNOSIS — I87.2 PERIPHERAL VENOUS INSUFFICIENCY: ICD-10-CM

## 2023-02-22 DIAGNOSIS — M79.671 HEEL PAIN, BILATERAL: ICD-10-CM

## 2023-02-22 DIAGNOSIS — M25.473 ANKLE SWELLING, UNSPECIFIED LATERALITY: Primary | ICD-10-CM

## 2023-02-22 DIAGNOSIS — I82.5Z2 CHRONIC DEEP VEIN THROMBOSIS (DVT) OF DISTAL VEIN OF LEFT LOWER EXTREMITY (HCC): ICD-10-CM

## 2023-02-22 PROCEDURE — 3008F BODY MASS INDEX DOCD: CPT | Performed by: PODIATRIST

## 2023-02-22 PROCEDURE — 99204 OFFICE O/P NEW MOD 45 MIN: CPT | Performed by: PODIATRIST

## 2023-02-22 PROCEDURE — 73650 X-RAY EXAM OF HEEL: CPT | Performed by: PODIATRIST

## 2023-02-28 ENCOUNTER — APPOINTMENT (OUTPATIENT)
Dept: PHYSICAL THERAPY | Age: 53
End: 2023-02-28
Attending: PHYSICAL MEDICINE & REHABILITATION
Payer: MEDICAID

## 2023-02-28 ENCOUNTER — TELEPHONE (OUTPATIENT)
Dept: PHYSICAL THERAPY | Facility: HOSPITAL | Age: 53
End: 2023-02-28

## 2023-02-28 PROCEDURE — 97110 THERAPEUTIC EXERCISES: CPT

## 2023-02-28 PROCEDURE — 97161 PT EVAL LOW COMPLEX 20 MIN: CPT

## 2023-03-01 ENCOUNTER — TELEPHONE (OUTPATIENT)
Dept: PHYSICAL THERAPY | Facility: HOSPITAL | Age: 53
End: 2023-03-01

## 2023-03-02 ENCOUNTER — APPOINTMENT (OUTPATIENT)
Dept: PHYSICAL THERAPY | Age: 53
End: 2023-03-02
Attending: PHYSICAL MEDICINE & REHABILITATION
Payer: MEDICAID

## 2023-03-05 ENCOUNTER — HOSPITAL ENCOUNTER (OUTPATIENT)
Dept: MRI IMAGING | Facility: HOSPITAL | Age: 53
End: 2023-03-05
Attending: PODIATRIST
Payer: MEDICAID

## 2023-03-05 ENCOUNTER — HOSPITAL ENCOUNTER (OUTPATIENT)
Dept: MRI IMAGING | Facility: HOSPITAL | Age: 53
Discharge: HOME | End: 2023-03-05
Attending: PODIATRIST
Payer: MEDICAID

## 2023-03-05 DIAGNOSIS — M25.473 ANKLE SWELLING, UNSPECIFIED LATERALITY: ICD-10-CM

## 2023-03-05 PROCEDURE — 73721 MRI JNT OF LWR EXTRE W/O DYE: CPT | Performed by: PODIATRIST

## 2023-03-06 ENCOUNTER — TELEPHONE (OUTPATIENT)
Dept: ORTHOPEDICS CLINIC | Facility: CLINIC | Age: 53
End: 2023-03-06

## 2023-03-06 NOTE — TELEPHONE ENCOUNTER
----- Message from Vidal. Paulette Judd DPM sent at 3/6/2023  4:33 PM CST -----  Please call patient to come in and go over test results.   She is Chinese speaking

## 2023-03-07 ENCOUNTER — APPOINTMENT (OUTPATIENT)
Dept: PHYSICAL THERAPY | Age: 53
End: 2023-03-07
Attending: PHYSICAL MEDICINE & REHABILITATION
Payer: MEDICAID

## 2023-03-07 NOTE — TELEPHONE ENCOUNTER
Patient has appt today:    Future Appointments   Date Time Provider Kendall Villagran   3/7/2023  2:45 PM Paxton Porter DPM [facility] [id number]

## 2023-03-08 ENCOUNTER — OFFICE VISIT (OUTPATIENT)
Dept: ORTHOPEDICS CLINIC | Facility: CLINIC | Age: 53
End: 2023-03-08
Payer: MEDICAID

## 2023-03-08 VITALS — WEIGHT: 210 LBS | HEIGHT: 66.14 IN | BODY MASS INDEX: 33.75 KG/M2

## 2023-03-08 DIAGNOSIS — Z86.718 HISTORY OF DEEP VEIN THROMBOSIS (DVT) OF LOWER EXTREMITY: ICD-10-CM

## 2023-03-08 DIAGNOSIS — M65.9 TENOSYNOVITIS OF ANKLE: Primary | ICD-10-CM

## 2023-03-08 DIAGNOSIS — M76.62 ACHILLES TENDINITIS OF LEFT LOWER EXTREMITY: ICD-10-CM

## 2023-03-08 PROBLEM — M65.979 TENOSYNOVITIS OF ANKLE: Status: ACTIVE | Noted: 2023-03-08

## 2023-03-08 PROCEDURE — 99214 OFFICE O/P EST MOD 30 MIN: CPT | Performed by: PODIATRIST

## 2023-03-08 PROCEDURE — 3008F BODY MASS INDEX DOCD: CPT | Performed by: PODIATRIST

## 2023-03-09 ENCOUNTER — APPOINTMENT (OUTPATIENT)
Dept: PHYSICAL THERAPY | Age: 53
End: 2023-03-09
Attending: PHYSICAL MEDICINE & REHABILITATION
Payer: MEDICAID

## 2023-03-09 ENCOUNTER — OFFICE VISIT (OUTPATIENT)
Dept: PHYSICAL THERAPY | Facility: HOSPITAL | Age: 53
End: 2023-03-09
Attending: PHYSICAL MEDICINE & REHABILITATION
Payer: MEDICAID

## 2023-03-09 ENCOUNTER — TELEPHONE (OUTPATIENT)
Dept: NEUROLOGY | Facility: CLINIC | Age: 53
End: 2023-03-09

## 2023-03-09 PROCEDURE — 97140 MANUAL THERAPY 1/> REGIONS: CPT

## 2023-03-09 PROCEDURE — 97110 THERAPEUTIC EXERCISES: CPT

## 2023-03-09 NOTE — TELEPHONE ENCOUNTER
Pt. States she developed bilateral knee swelling 5 days after bilateral knee aspiration and right knee CSI and is progressively getting worse. Has \"ball\" in the posterior of right knee, Baker's cyst? Also asking if you can aspirate left ankle?; see MRI left ankle ordered by Dr. Marsha Johnson.  Added her on 03/13/23 at 3:20 pm. Will inform Dr. Adan Leigh

## 2023-03-09 NOTE — TELEPHONE ENCOUNTER
Patient requesting a call back from nurse as she has clinical information to provide to Dr.Behar after on MRI that was order by Kade Lawrence.

## 2023-03-09 NOTE — PROGRESS NOTES
Dx: Patellofemoral pain syndrome, unspecified laterality (M22.2X9)  Primary osteoarthritis of knee, unspecified laterality (M17.10)  Synovial cyst of right popliteal space (M71.21)  Bilateral knee effusions (M25.461,M25.462)  Lipoma of lower extremity, unspecified laterality (D17.20)     Authorized # of visit: unknown Next MD visits: 3/13/23  Fall Risk: standard Precautions: none    Date: 3/9/23  Tx #: 2    Virtual interpretor used for today's session - reference # [id number]    Current Pain Level: 8/10      Subjective: Patient reports her knees are very painful today, stating she is having difficulty walking. She notes she has a \"lump\" behind the knee, stating this has been there for a while. Reports the right knee was drained but the       Objective: Treatment per flow sheet. Assessment: Patent initially c/o global knee pain R>L but responds well to therapy, reporting significant pain reduction by completion of treatment (4/10 post-tx today). Trial of KT taping to address swelling and promote pain relief on R with patient instructed in proper wear and removal. Significant tightness present throughout the hip and LE musculature bilaterally - issue additional HEP next session focusing on self hip and LE stretching as tolerated. Plan: Continue per plan of care.       Treatment Flow:  Patellar mobs all planes grade 3 R/L, multiple bouts  90/90 HSS 2x30\" B  Piriformis stretch 2x30\" B  Adductor stretch 2x30\" B  STM to hip flexor/quad x5' each R/L  R/L ITB foam roll stretching x3' each  Quad set into towel roll 10 x3\" hold each R/L  Glut sqz (legs elevated on bolster) 10 x5\" hold  KT taping to R knee for global knee pain      Charges: MAN, 2 ALICJA Total Timed Treatment: 45 minutes  Total Treatment Time: 45 minutes

## 2023-03-13 ENCOUNTER — OFFICE VISIT (OUTPATIENT)
Dept: PHYSICAL MEDICINE AND REHAB | Facility: CLINIC | Age: 53
End: 2023-03-13
Payer: MEDICAID

## 2023-03-13 VITALS
HEIGHT: 66 IN | WEIGHT: 210 LBS | BODY MASS INDEX: 33.75 KG/M2 | DIASTOLIC BLOOD PRESSURE: 100 MMHG | SYSTOLIC BLOOD PRESSURE: 150 MMHG

## 2023-03-13 DIAGNOSIS — M22.2X9 PATELLOFEMORAL PAIN SYNDROME, UNSPECIFIED LATERALITY: Primary | ICD-10-CM

## 2023-03-13 DIAGNOSIS — M25.461 BILATERAL KNEE EFFUSIONS: ICD-10-CM

## 2023-03-13 DIAGNOSIS — I82.502 LEG DVT (DEEP VENOUS THROMBOEMBOLISM), CHRONIC, LEFT (HCC): ICD-10-CM

## 2023-03-13 DIAGNOSIS — M71.21 SYNOVIAL CYST OF RIGHT POPLITEAL SPACE: ICD-10-CM

## 2023-03-13 DIAGNOSIS — I87.2 PERIPHERAL VENOUS INSUFFICIENCY: ICD-10-CM

## 2023-03-13 DIAGNOSIS — M17.10 PRIMARY OSTEOARTHRITIS OF KNEE, UNSPECIFIED LATERALITY: ICD-10-CM

## 2023-03-13 DIAGNOSIS — M25.462 BILATERAL KNEE EFFUSIONS: ICD-10-CM

## 2023-03-13 PROCEDURE — 20611 DRAIN/INJ JOINT/BURSA W/US: CPT | Performed by: PHYSICAL MEDICINE & REHABILITATION

## 2023-03-13 PROCEDURE — 3077F SYST BP >= 140 MM HG: CPT | Performed by: PHYSICAL MEDICINE & REHABILITATION

## 2023-03-13 PROCEDURE — 3080F DIAST BP >= 90 MM HG: CPT | Performed by: PHYSICAL MEDICINE & REHABILITATION

## 2023-03-13 PROCEDURE — 99214 OFFICE O/P EST MOD 30 MIN: CPT | Performed by: PHYSICAL MEDICINE & REHABILITATION

## 2023-03-13 PROCEDURE — 3008F BODY MASS INDEX DOCD: CPT | Performed by: PHYSICAL MEDICINE & REHABILITATION

## 2023-03-14 ENCOUNTER — APPOINTMENT (OUTPATIENT)
Dept: PHYSICAL THERAPY | Age: 53
End: 2023-03-14
Attending: PHYSICAL MEDICINE & REHABILITATION
Payer: MEDICAID

## 2023-03-14 ENCOUNTER — OFFICE VISIT (OUTPATIENT)
Dept: PHYSICAL THERAPY | Facility: HOSPITAL | Age: 53
End: 2023-03-14
Attending: PHYSICAL MEDICINE & REHABILITATION
Payer: MEDICAID

## 2023-03-14 PROCEDURE — 97110 THERAPEUTIC EXERCISES: CPT

## 2023-03-14 PROCEDURE — 97140 MANUAL THERAPY 1/> REGIONS: CPT

## 2023-03-14 RX ORDER — LIDOCAINE HYDROCHLORIDE 10 MG/ML
3 INJECTION, SOLUTION INFILTRATION; PERINEURAL ONCE
Status: COMPLETED | OUTPATIENT
Start: 2023-03-14 | End: 2023-03-14

## 2023-03-14 NOTE — PROGRESS NOTES
Dx: Patellofemoral pain syndrome, unspecified laterality (M22.2X9)  Primary osteoarthritis of knee, unspecified laterality (M17.10)  Synovial cyst of right popliteal space (M71.21)  Bilateral knee effusions (M25.461,M25.462)  Lipoma of lower extremity, unspecified laterality (D17.20)     Authorized # of visit: unknown Next MD visits: 3/13/23  Fall Risk: standard Precautions: none    Date: 3/14/23  Tx #: 3    Virtual interpretor used for today's session - reference # [id number]    Current Pain Level: 8-9/10 (right a little more than left)      Subjective: Patient reports that her knees are worse since she was last here, stating she can barely walk at this point. She reports she saw the MD yesterday and they drained the knee again. She reports she had steroid injectio about 6 months ago with relief only for about a week. She reports she had relief for about 1-2 days following last PT session but pain gradually returned. States her knees are very painful with work. Objective: Treatment per flow sheet. Assessment: Patient education in HEP consisting of HS stretch, EOB mayi stretch, self ITB stretch with strap, seated piriformis stretch, adductor stretch, gastroc and soleus stretch. Had to modify piriformis stretch to supine with ankle over knee as seated piriformis stretch to too painful on the knee in that position. Patient does report pain relief post-tx, rating pain 4/10 and stating the knees feel \"looser\". Plan: Assess tolerance to HEP next session to determine if any modification is necessary. Continue to address soft tissue mobility and flexibility throughout the hip and LE musculature. .      Treatment Flow:  Patellar mobs all planes grade 3 R/L, multiple bouts  Self 90/90 HSS 2x30\" B  EOB self mayi stretch with strap 2x30\" B  Self ITB stretch with strap 2x30\" B  Self gastroc stretch 2x30\"  Self soleus stretch 2x30\"  Seated Piriformis stretch x30\" - modified to supine fig 4 as this causes less pain in the knee  Adductor stretch 2x30\" B  STM to hip flexor/quad x5' each R/L  R/L ITB foam roll stretching x3' each      Charges: MAN, 2 ALICJA Total Timed Treatment: 45 minutes  Total Treatment Time: 45 minutes

## 2023-03-16 ENCOUNTER — APPOINTMENT (OUTPATIENT)
Dept: PHYSICAL THERAPY | Facility: HOSPITAL | Age: 53
End: 2023-03-16
Attending: PHYSICAL MEDICINE & REHABILITATION
Payer: MEDICAID

## 2023-03-16 ENCOUNTER — APPOINTMENT (OUTPATIENT)
Dept: PHYSICAL THERAPY | Age: 53
End: 2023-03-16
Attending: PHYSICAL MEDICINE & REHABILITATION
Payer: MEDICAID

## 2023-03-21 ENCOUNTER — OFFICE VISIT (OUTPATIENT)
Dept: PHYSICAL THERAPY | Facility: HOSPITAL | Age: 53
End: 2023-03-21
Attending: PHYSICAL MEDICINE & REHABILITATION
Payer: MEDICAID

## 2023-03-21 ENCOUNTER — APPOINTMENT (OUTPATIENT)
Dept: PHYSICAL THERAPY | Age: 53
End: 2023-03-21
Attending: PHYSICAL MEDICINE & REHABILITATION
Payer: MEDICAID

## 2023-03-21 PROCEDURE — 97140 MANUAL THERAPY 1/> REGIONS: CPT

## 2023-03-21 PROCEDURE — 97110 THERAPEUTIC EXERCISES: CPT

## 2023-03-21 NOTE — PROGRESS NOTES
Dx: Patellofemoral pain syndrome, unspecified laterality (M22.2X9)  Primary osteoarthritis of knee, unspecified laterality (M17.10)  Synovial cyst of right popliteal space (M71.21)  Bilateral knee effusions (M25.461,M25.462)  Lipoma of lower extremity, unspecified laterality (D17.20)     Authorized # of visit: unknown Next MD visits: 3/13/23  Fall Risk: standard Precautions: none    Date: 3/21/23  Tx #: 4    Virtual interpretor used for today's session - reference # [id number]    Current Pain Level: 6/10      Subjective: Patient reports both knees have been doing a little better. She reports she wasn't able to perform the HEP issued last session due to time constraints but she did get new shoes and this has seemed to help the past few days to decrease her knee pain. Objective: Treatment per flow sheet. Assessment: Brief review of HEP issued last session with patient encouraged to perform this daily as it will help to lessen muscle tension and decrease compressive forces at the knees. Patient demonstrates good understanding and states she plans to start performance of HEP tomorrow. Patient tolerating tx well today and reporting pain decreases by the end of the session to 3/10. Plan: Continue per plan of care.       Treatment Flow:  Patellar mobs all planes grade 3 R/L, multiple bouts  STM to quad/hip flexor x5' each  Quad/hip flexor foam roll x3' each  90/90 HSS 2x30\" B  EOB mayi stretch  2x30\" B  Adductor stretch 2x30\" B  Manual piriformis stretch 2x30\" B  ITB foam roll manually in sdly x2' each  Self gastroc stretch 2x30\"  Self soleus stretch 2x30\"        Charges: MAN, 2 ALICJA Total Timed Treatment: 40 minutes  Total Treatment Time: 40 minutes

## 2023-03-23 ENCOUNTER — OFFICE VISIT (OUTPATIENT)
Dept: PHYSICAL THERAPY | Facility: HOSPITAL | Age: 53
End: 2023-03-23
Attending: PHYSICAL MEDICINE & REHABILITATION
Payer: MEDICAID

## 2023-03-23 ENCOUNTER — APPOINTMENT (OUTPATIENT)
Dept: PHYSICAL THERAPY | Age: 53
End: 2023-03-23
Attending: PHYSICAL MEDICINE & REHABILITATION
Payer: MEDICAID

## 2023-03-23 PROCEDURE — 97140 MANUAL THERAPY 1/> REGIONS: CPT

## 2023-03-23 PROCEDURE — 97110 THERAPEUTIC EXERCISES: CPT

## 2023-03-23 NOTE — PROGRESS NOTES
Dx: Patellofemoral pain syndrome, unspecified laterality (M22.2X9)  Primary osteoarthritis of knee, unspecified laterality (M17.10)  Synovial cyst of right popliteal space (M71.21)  Bilateral knee effusions (M25.461,M25.462)  Lipoma of lower extremity, unspecified laterality (D17.20)     Authorized # of visit: unknown Next MD visits: 3/13/23  Fall Risk: standard Precautions: none    Date: 3/23/23  Tx #: 4    Virtual interpretor used for today's session - reference # [id number]    Current Pain Level: not rated today      Subjective: Patient reports the right knee is feeling pretty good with less pain but the left knee is still more painful. Notes that she is seeing improvement in mobility, stating she has less difficulty with initial standing/walking after sitting. Also noticing decreased swelling of the right knee. Objective: Treatment per flow sheet. Assessment: Discussion regarding use of stationary bike/outdoor bike as well as pool today to promote increased activity with minimal to no impact. Patient demonstrates good understanding. Note significantly improved soft tissue mobility today throughout R hip flexor quad. Swelling throughout right knee is significantly less but left knee is still presenting with moderate-severe swelling. Plan: Continue per plan of care.       Treatment Flow:  Patellar mobs all planes grade 3 R/L, multiple bouts  STM to quad/hip flexor x5' each  Quad/hip flexor foam roll x3' each  90/90 HSS 2x30\" B  EOB mayi stretch  2x30\" B  Adductor stretch 2x30\" B  Manual piriformis stretch 2x30\" B  ITB foam roll manually in sdly x2' each  KT taping to L knee        Charges: MAN, 2 ALICJA Total Timed Treatment: 45 minutes  Total Treatment Time: 45 minutes

## 2023-03-28 ENCOUNTER — OFFICE VISIT (OUTPATIENT)
Dept: PHYSICAL THERAPY | Facility: HOSPITAL | Age: 53
End: 2023-03-28
Attending: PHYSICAL MEDICINE & REHABILITATION
Payer: MEDICAID

## 2023-03-28 PROCEDURE — 97140 MANUAL THERAPY 1/> REGIONS: CPT

## 2023-03-28 PROCEDURE — 97110 THERAPEUTIC EXERCISES: CPT

## 2023-03-28 NOTE — PROGRESS NOTES
Dx: Patellofemoral pain syndrome, unspecified laterality (M22.2X9)  Primary osteoarthritis of knee, unspecified laterality (M17.10)  Synovial cyst of right popliteal space (M71.21)  Bilateral knee effusions (M25.461,M25.462)  Lipoma of lower extremity, unspecified laterality (D17.20)     Authorized # of visit: unknown Next MD visits: 3/13/23  Fall Risk: standard Precautions: none    Date: 3/28/23  Tx #: 5    Virtual interpretor used for today's session - reference # [id number]    Current Pain Level: <5/10      Subjective: Patient reports she is very encouraged with her progress with PT, stating he knees are starting to feel much better. States she still gets increased pain on days where she is on her feet all day for work but she has been able to wake the next morning with pain intensity down. States pain today is <5/10 for both knees. And she feels she is walking much better, especially after getting up from sitting. .    Objective: Treatment per flow sheet. Assessment: Per patient request, issued neck/shoulder stretching program to HEP as she is complaining of pain and tightness in this area bilaterally (included UT stretch, levator stretch, corner pec stretch, sleeper stretch, and sdly posterior capsule stretch). Patient continues to demonstrate improvements in soft tissue mobility throughout the hip flexor/quads and ITB bilaterally ad knee flexion ROM is steadily improving as swelling is diminishing bilaterally. Plan: Continue to focus on hip/LE soft tissue mobility and flexibility as patient is demonstrating good strength throughout the hip/LE musculature.       Treatment Flow:  Patellar mobs all planes grade 3 R/L, multiple bouts  STM to quad/hip flexor x5' each  Quad/hip flexor foam roll x3' each  90/90 HSS 2x30\" B  EOB mayi stretch  2x30\" B  Adductor stretch 2x30\" B  Manual piriformis stretch 2x30\" B  ITB foam roll manually in sdly x2' each  KT taping to L knee  Issued additional stretching program for neck/shoulders per above        Charges: MAN, 2 ALICJA Total Timed Treatment: 45 minutes  Total Treatment Time: 45 minutes

## 2023-04-03 ENCOUNTER — OFFICE VISIT (OUTPATIENT)
Dept: FAMILY MEDICINE CLINIC | Facility: CLINIC | Age: 53
End: 2023-04-03

## 2023-04-03 VITALS
SYSTOLIC BLOOD PRESSURE: 135 MMHG | BODY MASS INDEX: 35.01 KG/M2 | DIASTOLIC BLOOD PRESSURE: 86 MMHG | HEART RATE: 61 BPM | RESPIRATION RATE: 16 BRPM | WEIGHT: 217.81 LBS | HEIGHT: 66 IN

## 2023-04-03 DIAGNOSIS — R53.81 PHYSICAL DECONDITIONING: ICD-10-CM

## 2023-04-03 DIAGNOSIS — F41.1 GAD (GENERALIZED ANXIETY DISORDER): ICD-10-CM

## 2023-04-03 DIAGNOSIS — I10 ESSENTIAL HYPERTENSION: Primary | ICD-10-CM

## 2023-04-03 DIAGNOSIS — M79.645 FINGER PAIN, LEFT: ICD-10-CM

## 2023-04-03 PROCEDURE — 3075F SYST BP GE 130 - 139MM HG: CPT | Performed by: FAMILY MEDICINE

## 2023-04-03 PROCEDURE — 3008F BODY MASS INDEX DOCD: CPT | Performed by: FAMILY MEDICINE

## 2023-04-03 PROCEDURE — 99214 OFFICE O/P EST MOD 30 MIN: CPT | Performed by: FAMILY MEDICINE

## 2023-04-03 PROCEDURE — 3079F DIAST BP 80-89 MM HG: CPT | Performed by: FAMILY MEDICINE

## 2023-04-03 RX ORDER — CARVEDILOL PHOSPHATE 20 MG/1
20 CAPSULE, EXTENDED RELEASE ORAL DAILY
Qty: 90 CAPSULE | Refills: 0 | Status: SHIPPED | OUTPATIENT
Start: 2023-04-03

## 2023-04-03 RX ORDER — MULTIVITAMIN WITH IRON
250 TABLET ORAL DAILY
Qty: 30 TABLET | Refills: 0 | Status: SHIPPED | OUTPATIENT
Start: 2023-04-03 | End: 2023-05-03

## 2023-04-04 ENCOUNTER — OFFICE VISIT (OUTPATIENT)
Dept: PHYSICAL THERAPY | Facility: HOSPITAL | Age: 53
End: 2023-04-04
Attending: PHYSICAL MEDICINE & REHABILITATION
Payer: MEDICAID

## 2023-04-04 PROCEDURE — 97110 THERAPEUTIC EXERCISES: CPT

## 2023-04-04 PROCEDURE — 97140 MANUAL THERAPY 1/> REGIONS: CPT

## 2023-04-04 NOTE — PROGRESS NOTES
Dx: Patellofemoral pain syndrome, unspecified laterality (M22.2X9)  Primary osteoarthritis of knee, unspecified laterality (M17.10)  Synovial cyst of right popliteal space (M71.21)  Bilateral knee effusions (M25.461,M25.462)  Lipoma of lower extremity, unspecified laterality (D17.20)     Authorized # of visit: unknown Next MD visits: 3/13/23  Fall Risk: standard Precautions: none    Date: 4/4/23  Tx #: 6    Virtual interpretor used for today's session - reference # [id number]    Current Pain Level: 3/10      Subjective: Patient reports knee pain on the right is gone and notes less swelling. Notes less swelling on the left but states the left is still painful, just not as intense as it had been. Continues to feel she is walking much better with smooth gait. Objective: Treatment per flow sheet. Assessment: Patient continues to have primary soft tissue tightness within the hip flexor/quad, adductor, and ITB bilaterally. This is improving but patient does report tenderness with STM/DTM throughout these areas. Knee flexion ROM continues to improve bilaterally and  Patient is able to perform sit to stand now without UE support. Plan: Continue per plan of care.       Treatment Flow:  Patellar mobs all planes grade 3 R/L, multiple bouts  STM to quad/hip flexor x5' each  Quad/hip flexor foam roll x3' each  90/90 HSS 2x30\" B  EOB mayi stretch  2x30\" B  Adductor stretch 2x30\" B  Manual piriformis stretch 2x30\" B  ITB foam roll manually in sdly x3' each      Charges: MAN, 2 ALICJA Total Timed Treatment: 45 minutes  Total Treatment Time: 45 minutes

## 2023-04-06 ENCOUNTER — OFFICE VISIT (OUTPATIENT)
Dept: PHYSICAL THERAPY | Facility: HOSPITAL | Age: 53
End: 2023-04-06
Attending: PHYSICAL MEDICINE & REHABILITATION
Payer: MEDICAID

## 2023-04-06 PROCEDURE — 97110 THERAPEUTIC EXERCISES: CPT

## 2023-04-06 PROCEDURE — 97140 MANUAL THERAPY 1/> REGIONS: CPT

## 2023-04-06 NOTE — PROGRESS NOTES
Dx: Patellofemoral pain syndrome, unspecified laterality (M22.2X9)  Primary osteoarthritis of knee, unspecified laterality (M17.10)  Synovial cyst of right popliteal space (M71.21)  Bilateral knee effusions (M25.461,M25.462)  Lipoma of lower extremity, unspecified laterality (D17.20)     Authorized # of visit: unknown Next MD visits: 3/13/23  Fall Risk: standard Precautions: none    Date: 4/6/23  Tx #: 7    Virtual interpretor used for today's session - reference # [id number]    Current Pain Level: 2/10      Subjective: Patient reports she feels the swelling in her knees has gone down quite a bit. States the right knee is feeling much better but still has a little pain in the left knee. States she feels she is walking much better. Objective: Treatment per flow sheet. Knee flexion AROM: R 140 deg; L 125 deg      Assessment: Patient is demonstrating decreased swelling of the knees both R/L but continues to have some mild swelling of the L compared to the right. Knee flexion ROM is significantly improved on the R but left is still limited due to stiffness/swelling but patient reports the knee is not painful at end range today. Plan: Continue to address soft tissue mobility and flexibility throughout the hip and LE musculature for reduced pain and improved functional ROM at the knees.       Treatment Flow:  Patellar mobs all planes grade 3 R/L, multiple bouts  STM to quad/hip flexor x5' each  Quad/hip flexor foam roll x3' each  90/90 HSS 2x30\" B  EOB mayi stretch  2x30\" B  Adductor stretch 2x30\" B  Manual piriformis stretch 2x30\" B  ITB foam roll manually in sdly x3' each      Charges: MAN, 2 ALICJA Total Timed Treatment: 45 minutes  Total Treatment Time: 45 minutes

## 2023-04-10 ENCOUNTER — TELEPHONE (OUTPATIENT)
Dept: FAMILY MEDICINE CLINIC | Facility: CLINIC | Age: 53
End: 2023-04-10

## 2023-04-10 ENCOUNTER — TELEPHONE (OUTPATIENT)
Dept: INTERNAL MEDICINE CLINIC | Facility: CLINIC | Age: 53
End: 2023-04-10

## 2023-04-10 DIAGNOSIS — M79.645 FINGER PAIN, LEFT: Primary | ICD-10-CM

## 2023-04-10 RX ORDER — CARVEDILOL 3.12 MG/1
3.12 TABLET ORAL 2 TIMES DAILY WITH MEALS
Qty: 180 TABLET | Refills: 0 | Status: SHIPPED | OUTPATIENT
Start: 2023-04-10 | End: 2023-07-09

## 2023-04-10 NOTE — TELEPHONE ENCOUNTER
New Prescription Request:      Message:    PATIENT REQUESTS NEW RX: CARVEDILOL ER 20 MG NOT COVERED UNLESS PA SUBMITTED. CAN MD PRESCRIBE THE IR TABLET?  IF NOT INSURANCEArva Slice: [phone] PT ID: [id number]

## 2023-04-10 NOTE — TELEPHONE ENCOUNTER
Patient states she was seen by Dr. Nancy West 04/03 and Dr. Nancy West was supposed to order an x-ray of her left hand due to having pain on her finger. Patient is at the hospital now to complete x-ray and is being told no order available for her. Patient took a taxi today and does not want to come back another day as its very expensive due to lack of transportation. Patient will be waiting on X-ray order. Patient requesting a call back as soon as order is available.

## 2023-04-25 ENCOUNTER — OFFICE VISIT (OUTPATIENT)
Dept: PHYSICAL THERAPY | Facility: HOSPITAL | Age: 53
End: 2023-04-25
Attending: PHYSICAL MEDICINE & REHABILITATION
Payer: MEDICAID

## 2023-04-25 PROCEDURE — 97110 THERAPEUTIC EXERCISES: CPT

## 2023-04-25 PROCEDURE — 97140 MANUAL THERAPY 1/> REGIONS: CPT

## 2023-04-25 NOTE — PROGRESS NOTES
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 Notes that the left knee began to swell again last Thursday and is still swollen, with this causing increased pain. Objective: Treatment per flow sheet. Assessment: Mild increase in swelling of R knee noted but significant increase in left knee and lower leg swelling present today. Patient does have compression wrapping on the lower leg which she states is helping some. Flexibility throughout the hip/LE musculature is improving with patient reporting she has been performing the HEP and tolerating this well. Educated patient to continue elevating the left LE and using compression but also incorporate ice to assist in swelling reduction. Plan: Continue per plan of care.       Treatment Flow:  Patellar mobs all planes grade 3 R/L, multiple bouts  STM to quad/hip flexor x5' each  Quad/hip flexor foam roll x3' each  90/90 HSS 2x30\" B  EOB mayi stretch  2x30\" B  Adductor stretch 2x30\" B  Manual piriformis stretch 2x30\" B  ITB foam roll manually in sdly 2' each      Charges: MAN, 2 ALICJA Total Timed Treatment: 38 minutes (patient was running a little late to this evening's appt)  Total Treatment Time: 38 minutes

## 2023-04-27 ENCOUNTER — OFFICE VISIT (OUTPATIENT)
Dept: PHYSICAL THERAPY | Facility: HOSPITAL | Age: 53
End: 2023-04-27
Attending: PHYSICAL MEDICINE & REHABILITATION
Payer: MEDICAID

## 2023-04-27 PROCEDURE — 97110 THERAPEUTIC EXERCISES: CPT

## 2023-04-27 PROCEDURE — 97140 MANUAL THERAPY 1/> REGIONS: CPT

## 2023-04-28 NOTE — PROGRESS NOTES
Discharged home,advised this is Against Medical Advise as test results are pending. Patient states he understands, has plane reservations for return to [place] today. Patient states he will follow up with his cardiologist (Dr. Dusty Sawant) in [place].   Dx: Patellofemoral pain syndrome, unspecified laterality (M22.2X9)  Primary osteoarthritis of knee, unspecified laterality (M17.10)  Synovial cyst of right popliteal space (M71.21)  Bilateral knee effusions (M25.461,M25.462)  Lipoma of lower extremity, unspecified laterality (D17.20)     Authorized # of visit: unknown Next MD visits: 3/13/23  Fall Risk: standard Precautions: none    Date: 4/27/23  Tx #: 9    Virtual interpretor used for today's session - reference # (NA)    Current Pain Level: R knee 4/10; L knee 7/10      Subjective: Patient reports her left knee is still quite painful and the L LE is still swollen. States the right knee is not too bad but still a little painful. Objective: Treatment per flow sheet. Assessment: Reincorporated use of KT taping to the L knee today to assist in pain management and swelling reduction. L knee, lower leg, ankle, and foot are all swollen - questioning if patient may have venous insufficiency contributing to chronic gross swelling of the LE. Patient continues to demonstrate improvement in hip and LE flexibility although L knee flexion ROM is limited secondary to swelling. Plan: Continue per plan of care.       Treatment Flow:  Patellar mobs all planes grade 3 R/L, multiple bouts  STM to quad/hip flexor x5' each  Quad/hip flexor foam roll x3' each  90/90 HSS 2x30\" B  EOB mayi stretch  2x30\" B  Adductor stretch 2x30\" B  Manual piriformis stretch 2x30\" B  ITB foam roll manually in sdly 2' each  L knee KT taping      Charges: MAN, 2 ALICJA Total Timed Treatment: 42 minutes   Total Treatment Time: 42 minutes

## 2023-05-02 ENCOUNTER — TELEPHONE (OUTPATIENT)
Dept: PHYSICAL THERAPY | Facility: HOSPITAL | Age: 53
End: 2023-05-02

## 2023-05-02 ENCOUNTER — APPOINTMENT (OUTPATIENT)
Dept: PHYSICAL THERAPY | Facility: HOSPITAL | Age: 53
End: 2023-05-02
Attending: PHYSICAL MEDICINE & REHABILITATION
Payer: MEDICAID

## 2023-05-04 ENCOUNTER — APPOINTMENT (OUTPATIENT)
Dept: PHYSICAL THERAPY | Facility: HOSPITAL | Age: 53
End: 2023-05-04
Attending: PHYSICAL MEDICINE & REHABILITATION
Payer: MEDICAID

## 2023-05-09 ENCOUNTER — APPOINTMENT (OUTPATIENT)
Dept: PHYSICAL THERAPY | Facility: HOSPITAL | Age: 53
End: 2023-05-09
Attending: PHYSICAL MEDICINE & REHABILITATION
Payer: MEDICAID

## 2023-05-11 ENCOUNTER — TELEPHONE (OUTPATIENT)
Dept: PHYSICAL THERAPY | Facility: HOSPITAL | Age: 53
End: 2023-05-11

## 2023-05-11 ENCOUNTER — APPOINTMENT (OUTPATIENT)
Dept: PHYSICAL THERAPY | Facility: HOSPITAL | Age: 53
End: 2023-05-11
Attending: PHYSICAL MEDICINE & REHABILITATION
Payer: MEDICAID

## 2023-05-23 ENCOUNTER — OFFICE VISIT (OUTPATIENT)
Dept: PHYSICAL THERAPY | Facility: HOSPITAL | Age: 53
End: 2023-05-23
Attending: FAMILY MEDICINE
Payer: MEDICAID

## 2023-05-23 PROCEDURE — 97110 THERAPEUTIC EXERCISES: CPT

## 2023-05-23 PROCEDURE — 97140 MANUAL THERAPY 1/> REGIONS: CPT

## 2023-05-23 NOTE — PROGRESS NOTES
Dx: Patellofemoral pain syndrome, unspecified laterality (M22.2X9)  Primary osteoarthritis of knee, unspecified laterality (M17.10)  Synovial cyst of right popliteal space (M71.21)  Bilateral knee effusions (M25.461,M25.462)  Lipoma of lower extremity, unspecified laterality (D17.20)     Authorized # of visit: unknown Next MD visits: 3/13/23  Fall Risk: standard Precautions: none    Date: 5/23/23  Tx #: 10    Virtual interpretor used for today's session - reference # (NA)    Current Pain Level: R knee 3/10; L knee 5-6/10      Subjective: Patient reports she had traveled to [place] for several days and just got back into town this morning and then worked all day, stating she was on her feet a lot and the knees are fairly painful. Notes swelling is still an issue. Objective: Treatment per flow sheet. Knee ext ROM: 0 deg B  Knee flexion ROM: R 128 deg; L 116 deg      Assessment: Swelling is not as significant bilaterally today as it has been in the past but still mild-moderate swelling present throughout the peripatellar area L>R contributing to decreased knee flexion ROM on the L at end range. Strength throughout the hips and LEs is 5/5 and hip/LE flexibility is improving but still moderate tightness of hip adductors, hip flexor/quad, ITB, and gastroc bilaterally. Plan: Continue to address soft tissue mobility, knee ROM, LE flexibility as tolerated for decreased pain with functional activities.       Treatment Flow:  Patellar mobs all planes grade 3-4 R/L, multiple bouts  STM to quad/hip flexor x5' each  Quad/hip flexor foam roll x3' each EOB Primitivo position   EOB [employer] stretch 2x30\" B  90/90 HSS 2x30\" B  Adductor stretch 2x30\" B  Manual piriformis stretch 2x30\" B  ITB foam roll manually in sdly 2' each  Knee flexion PROM x3' on L        Charges: MAN, 2 ALICJA Total Timed Treatment: 40 minutes   Total Treatment Time: 40 minutes

## 2023-05-24 ENCOUNTER — OFFICE VISIT (OUTPATIENT)
Dept: ORTHOPEDICS CLINIC | Facility: CLINIC | Age: 53
End: 2023-05-24
Payer: MEDICAID

## 2023-05-24 VITALS — HEIGHT: 66.14 IN | BODY MASS INDEX: 34.87 KG/M2 | WEIGHT: 217 LBS

## 2023-05-24 DIAGNOSIS — Z86.718 HISTORY OF DEEP VEIN THROMBOSIS (DVT) OF LOWER EXTREMITY: ICD-10-CM

## 2023-05-24 DIAGNOSIS — M76.62 ACHILLES TENDINITIS OF LEFT LOWER EXTREMITY: ICD-10-CM

## 2023-05-24 DIAGNOSIS — I83.90 VARICOSE VEINS: ICD-10-CM

## 2023-05-24 DIAGNOSIS — M65.9 TENOSYNOVITIS OF ANKLE: Primary | ICD-10-CM

## 2023-05-24 PROCEDURE — 99213 OFFICE O/P EST LOW 20 MIN: CPT | Performed by: PODIATRIST

## 2023-05-24 PROCEDURE — 3008F BODY MASS INDEX DOCD: CPT | Performed by: PODIATRIST

## 2023-05-24 NOTE — PROGRESS NOTES
[facility] Progress Note    Subjective:   Roseanna Dias is here for follow-up and this visit is done through  services    Has been doing PT for knees and requesting for her ankles  Some better with Hoka shoes      Offered boot last visit - did it help? Not much help - hard to use and only wears in afternoon. Was an option for pain and swelling. Objective:     Posterolateral , anterolateral ankle pain left mainly    Increased varicosities   Swelling bilateral ankles          Imaging: None today        Assessment/Plan:     Diagnoses and all orders for this visit:    Tenosynovitis of ankle    Achilles tendinitis of left lower extremity    History of deep vein thrombosis (DVT) of lower extremity    Discussed her progress and good shoes. Continued use of compression stockings    Add PT for ankles bilateral along with PT for knees      Follow-up after PT if she has not improved otherwise as needed      Basil Ramires. Lawrence Martinez DPM  [facility]    Mindscape speech recognition software was used to prepare this note. If a word or phrase is confusing, it is likely do to a failure of recognition. Please contact me with any questions or clarifications.

## 2023-05-25 ENCOUNTER — OFFICE VISIT (OUTPATIENT)
Dept: PHYSICAL THERAPY | Facility: HOSPITAL | Age: 53
End: 2023-05-25
Attending: FAMILY MEDICINE
Payer: MEDICAID

## 2023-05-25 PROCEDURE — 97110 THERAPEUTIC EXERCISES: CPT

## 2023-05-25 PROCEDURE — 97140 MANUAL THERAPY 1/> REGIONS: CPT

## 2023-05-31 ENCOUNTER — OFFICE VISIT (OUTPATIENT)
Dept: FAMILY MEDICINE CLINIC | Facility: CLINIC | Age: 53
End: 2023-05-31

## 2023-05-31 ENCOUNTER — HOSPITAL ENCOUNTER (OUTPATIENT)
Dept: ULTRASOUND IMAGING | Facility: HOSPITAL | Age: 53
Discharge: HOME | End: 2023-05-31
Attending: FAMILY MEDICINE
Payer: MEDICAID

## 2023-05-31 VITALS — WEIGHT: 215 LBS | BODY MASS INDEX: 34.55 KG/M2 | HEIGHT: 66 IN | RESPIRATION RATE: 17 BRPM

## 2023-05-31 DIAGNOSIS — E66.09 CLASS 1 OBESITY DUE TO EXCESS CALORIES WITH SERIOUS COMORBIDITY AND BODY MASS INDEX (BMI) OF 34.0 TO 34.9 IN ADULT: ICD-10-CM

## 2023-05-31 DIAGNOSIS — M79.662 PAIN OF LEFT CALF: ICD-10-CM

## 2023-05-31 DIAGNOSIS — I10 ESSENTIAL HYPERTENSION: ICD-10-CM

## 2023-05-31 DIAGNOSIS — R73.03 PREDIABETES: Primary | ICD-10-CM

## 2023-05-31 DIAGNOSIS — I83.90 VARICOSE VEINS: ICD-10-CM

## 2023-05-31 DIAGNOSIS — I87.2 PERIPHERAL VENOUS INSUFFICIENCY: ICD-10-CM

## 2023-05-31 PROCEDURE — 3008F BODY MASS INDEX DOCD: CPT | Performed by: FAMILY MEDICINE

## 2023-05-31 PROCEDURE — 99214 OFFICE O/P EST MOD 30 MIN: CPT | Performed by: FAMILY MEDICINE

## 2023-05-31 PROCEDURE — 93971 EXTREMITY STUDY: CPT | Performed by: FAMILY MEDICINE

## 2023-05-31 RX ORDER — METOPROLOL SUCCINATE 50 MG/1
50 TABLET, EXTENDED RELEASE ORAL EVERY MORNING
COMMUNITY
Start: 2023-03-02

## 2023-05-31 RX ORDER — SEMAGLUTIDE 0.68 MG/ML
0.25 INJECTION, SOLUTION SUBCUTANEOUS WEEKLY
Qty: 2 ML | Refills: 0 | Status: SHIPPED | OUTPATIENT
Start: 2023-05-31

## 2023-06-01 ENCOUNTER — TELEPHONE (OUTPATIENT)
Dept: FAMILY MEDICINE CLINIC | Facility: CLINIC | Age: 53
End: 2023-06-01

## 2023-06-01 DIAGNOSIS — R73.03 PREDIABETES: Primary | ICD-10-CM

## 2023-06-01 DIAGNOSIS — E66.09 CLASS 1 OBESITY DUE TO EXCESS CALORIES WITH SERIOUS COMORBIDITY AND BODY MASS INDEX (BMI) OF 34.0 TO 34.9 IN ADULT: ICD-10-CM

## 2023-06-01 NOTE — TELEPHONE ENCOUNTER
Spoke to [employer]zz and patient has both medicaid and [employer]. Medicaid doesn't cover it at all and [employer] requires a prior authorization. Patient has access [employer] and they are closed.  [phone]

## 2023-06-02 NOTE — TELEPHONE ENCOUNTER
Payer:  [facility]  [phone]     Phoned payor to initiate PA for Ozempic, per [facility] care[employer] this is to be handled by [facility] @ [phone]    Call was transferred to agent Theo Augustin', multiple attempts made- EXT incorrect    Option 2 for provider, then OPT 4- went to the same Ext of \"Tuyet\"    Will call again

## 2023-06-05 NOTE — TELEPHONE ENCOUNTER
Attempted to call [facility] again, call goes to  for [facility], Left detailed  for agent to return out call. Phoned pharmacy to verify INS on file, verified PA is still needed. Was told to call [phone], [facility] does not handle PA. Was given the same [facility] contact.      [facility] [employer] Fax: [phone]    Faxed over generic form    If call back please transfer to triage

## 2023-06-06 ENCOUNTER — TELEPHONE (OUTPATIENT)
Dept: PHYSICAL THERAPY | Facility: HOSPITAL | Age: 53
End: 2023-06-06

## 2023-06-06 ENCOUNTER — OFFICE VISIT (OUTPATIENT)
Dept: PHYSICAL THERAPY | Facility: HOSPITAL | Age: 53
End: 2023-06-06
Attending: FAMILY MEDICINE
Payer: MEDICAID

## 2023-06-06 DIAGNOSIS — F43.10 PTSD (POST-TRAUMATIC STRESS DISORDER): ICD-10-CM

## 2023-06-06 PROCEDURE — 97140 MANUAL THERAPY 1/> REGIONS: CPT

## 2023-06-06 PROCEDURE — 97110 THERAPEUTIC EXERCISES: CPT

## 2023-06-06 RX ORDER — ORAL SEMAGLUTIDE 3 MG/1
3 TABLET ORAL DAILY
Qty: 30 TABLET | Refills: 0 | Status: SHIPPED | OUTPATIENT
Start: 2023-06-06 | End: 2023-06-07

## 2023-06-06 NOTE — TELEPHONE ENCOUNTER
Prescription for Rybelsus sent, please inform patient. Please inform medication will be oral instead of injectable, if approved please inform patient recommendations of how to take medication as noted below:  One pill daily on an empty stomach with no more than 4 oz of plain water. Swallow whole. Do not split, crush, or chew. Wait at least 30 minutes to eat, drink, or take other oral medications.

## 2023-06-06 NOTE — TELEPHONE ENCOUNTER
[employer] does not cover Ozempic. Formulary alternative   Rybelsus, any dose.     Per [employer]

## 2023-06-07 RX ORDER — DULAGLUTIDE 0.75 MG/.5ML
0.75 INJECTION, SOLUTION SUBCUTANEOUS WEEKLY
Qty: 2 ML | Refills: 0 | Status: SHIPPED | OUTPATIENT
Start: 2023-06-07

## 2023-06-07 RX ORDER — HYDROXYZINE HYDROCHLORIDE 25 MG/1
25 TABLET, FILM COATED ORAL EVERY 8 HOURS PRN
Qty: 30 TABLET | Refills: 3 | Status: SHIPPED | OUTPATIENT
Start: 2023-06-07

## 2023-06-07 NOTE — TELEPHONE ENCOUNTER
Patient is calling to ask if PCP would be able to prescribe the following as she was informed other medication was not authorized. Qsymia  Or    Phentemine    Is this advisable with the other medications presently taking. Please advise.

## 2023-06-07 NOTE — PROGRESS NOTES
Dx: Patellofemoral pain syndrome, unspecified laterality (M22.2X9)  Primary osteoarthritis of knee, unspecified laterality (M17.10)  Synovial cyst of right popliteal space (M71.21)  Bilateral knee effusions (M25.461,M25.462)  Lipoma of lower extremity, unspecified laterality (D17.20)     Authorized # of visit: unknown Next MD visits: 3/13/23  Fall Risk: standard Precautions: none    Date: 6/6/23  Tx #: 12      Current Pain Level: not reported today      Subjective: Patient reports the knee pain has not been as significant, stating she is still aware of some swelling in the left knee but it is not as bad. Reports She has been wearing Hoka shoes for work and the extra cushion seems to offer some relief . Objective:       Assessment: Moderate swelling of L knee still present but knee flexion ROM is minimally limited by this now whereas knee flexion to end range had been limited and painful prior. Patient notes relief with soft tissue work as well as stretching and hip/LE flexibility is improving. Plan: Continue per plan of care.     Treatment Flow:  Patellar mobs all planes grade 3-4 R/L, multiple bouts  STM to quad/hip flexor x5' each  Quad/hip flexor foam roll x3' each EOB Primitivo position   EOB [employer] stretch 2x30\" B  90/90 HSS 2x30\" B  Adductor stretch 2x30\" B  Manual piriformis stretch 2x30\" B  ITB foam roll manually in sdly 2' each  Knee flexion PROM x3' on L      Charges: MAN, 2 ALICJA Total Timed Treatment: 45 minutes   Total Treatment Time: 45 minutes

## 2023-06-07 NOTE — TELEPHONE ENCOUNTER
Please review. Protocol failed / Has no protocol. Requested Prescriptions   Pending Prescriptions Disp Refills    HYDROXYZINE 25 MG Oral Tab [Pharmacy Med Name: HYDROXYZINE HCL 25 MG TABLET] 30 tablet 0     Sig: TAKE 1 TABLET (25 MG TOTAL) BY MOUTH EVERY 8 (EIGHT) HOURS AS NEEDED FOR ANXIETY (AND SLEEP).        There is no refill protocol information for this order        Future Appointments         Provider Department Appt Notes    In 1 week Darin 4, Selene West, 1 [facility] Physical Therapy Medicaid    In 1 week [facility], PT [facility] Physical Therapy Medicaid    In 2 weeks [facility], PT [facility] Physical Therapy Medicaid    In 4 weeks [facility], PT [facility] Physical Therapy Medicaid    In 1 month [facility], 1 [facility] Physical Therapy Medicaid    In 1 month [facility], 1 [facility] Physical Therapy Medicaid    In 1 month [facility], 1 [facility] Physical Therapy Medicaid    In 1 month [facility], 1 [facility] Physical Therapy Medicaid    In 1 month [facility], 1 [facility] Physical Therapy Medicaid           Recent Outpatient Visits              [facility], PT    Office Visit    1 week ago Prediabetes    [facility], [address], [place] Gus Lama MD    Office Visit    1 week ago     [address] [facility], PT    Office Visit    2 weeks ago Tenosynovitis of ankle    [facility] Jj Crocker., DPM    Office Visit    2 weeks ago     [address] PT    Office Visit

## 2023-06-07 NOTE — TELEPHONE ENCOUNTER
With  Nilson Deleon #[id number], Patient advised of notes below. Patient stated that she has tried rybelsus in the past and it made her very nauseated. Does not want the oral medication. Patient would like an injectable instead. Please advise.

## 2023-06-07 NOTE — TELEPHONE ENCOUNTER
Will send prescription for Trulicity, please inform patient, unclear if it would be approved as they may only cover 1 type of GLP, which could be the oral version.   Please inform patient

## 2023-06-08 NOTE — TELEPHONE ENCOUNTER
Left message for patient to call back. Please see provider note below. Schedule patient for follow up.

## 2023-06-08 NOTE — TELEPHONE ENCOUNTER
Phentermine is a stimulant that can increase her blood pressure, Qsymia has phentermine in it as well as another component, I would not recommend for her to start his medications in the context of her hypertension. There are other medication options but I would prefer that she make an appointment so we can discuss specifically the risk and benefits of the medications. Also please let her know that in general Medicaid will not cover  medications that are specifically FDA approved for weight loss like qsymia.

## 2023-06-09 DIAGNOSIS — E66.09 CLASS 1 OBESITY DUE TO EXCESS CALORIES WITH SERIOUS COMORBIDITY AND BODY MASS INDEX (BMI) OF 34.0 TO 34.9 IN ADULT: ICD-10-CM

## 2023-06-09 DIAGNOSIS — R73.03 PREDIABETES: ICD-10-CM

## 2023-06-09 RX ORDER — DULAGLUTIDE 0.75 MG/.5ML
0.75 INJECTION, SOLUTION SUBCUTANEOUS WEEKLY
Qty: 2 ML | Refills: 0 | OUTPATIENT
Start: 2023-06-09

## 2023-06-09 NOTE — TELEPHONE ENCOUNTER
Dulaglutide (TRULICITY) 1.66 HJ/7.9OK Subcutaneous Solution Pen-injector, Inject 0.75 mg into the skin once a week., Disp: 2 mL, Rfl: 0    Key: [id number]  Patient Last Name: Yeimi Cesar  : 3/15/1970

## 2023-06-15 ENCOUNTER — OFFICE VISIT (OUTPATIENT)
Dept: PHYSICAL THERAPY | Facility: HOSPITAL | Age: 53
End: 2023-06-15
Attending: FAMILY MEDICINE
Payer: MEDICAID

## 2023-06-15 ENCOUNTER — NURSE TRIAGE (OUTPATIENT)
Dept: FAMILY MEDICINE CLINIC | Facility: CLINIC | Age: 53
End: 2023-06-15

## 2023-06-15 PROCEDURE — 97110 THERAPEUTIC EXERCISES: CPT

## 2023-06-15 PROCEDURE — 97140 MANUAL THERAPY 1/> REGIONS: CPT

## 2023-06-15 NOTE — PROGRESS NOTES
Dx: Patellofemoral pain syndrome, unspecified laterality (M22.2X9)  Primary osteoarthritis of knee, unspecified laterality (M17.10)  Synovial cyst of right popliteal space (M71.21)  Bilateral knee effusions (M25.461,M25.462)  Lipoma of lower extremity, unspecified laterality (F58.22)     Authorized # of visit: unknown Next MD visits: 3/13/23  Fall Risk: standard Precautions: none    Date: 6/15/23  Tx #: 13      Current Pain Level: not rated today      Subjective: Patient reports her knee pain has been much better. She reports the right knee has been good but the left knee is still a little painful. Reports her new shoes seem to help and she has also been wearing compression stockings to help keep swelling down. Objective: Treatment per flow sheet. Knee flexion AROM: L 130 deg; R 135 deg      Assessment: Knee flexion ROM has significantly improved bilaterally with slightly less present on the L at end range, likely due to mild swelling still present on this side. The patient is demonstrating improved flexibility throughout the hip and LE musculature as well with moderate tightness remaining within the hip flexors/quads and adductors. Plan: 2 visits remaining on current PT schedule for treatment of the knees.     Treatment Flow:  Patellar mobs all planes grade 3-4 R/L, multiple bouts  STM to quad/hip flexor x5' each  Quad/hip flexor foam roll x3' each EOB Primitivo position   EOB Andriette Bhavesh stretch 2x30\" B  90/90 HSS 2x30\" B  Adductor stretch 2x30\" B  Manual piriformis stretch 2x30\" B  ITB foam roll manually in sdly 2' each  Knee flexion PROM x3' on L      Charges: MAN, 2 ALICJA Total Timed Treatment: 42 minutes   Total Treatment Time: 42 minutes

## 2023-06-20 ENCOUNTER — OFFICE VISIT (OUTPATIENT)
Dept: PHYSICAL THERAPY | Facility: HOSPITAL | Age: 53
End: 2023-06-20
Attending: FAMILY MEDICINE
Payer: MEDICAID

## 2023-06-20 PROCEDURE — 97140 MANUAL THERAPY 1/> REGIONS: CPT

## 2023-06-20 PROCEDURE — 97110 THERAPEUTIC EXERCISES: CPT

## 2023-06-20 NOTE — PROGRESS NOTES
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  States her L knee/lower leg was pretty swollen by the end of the day and she did not sleep well because of pain in various parts of her body. States the L knee/lower leg is still swollen today and \"a little painful\" but the R knee has been okay. Objective: Treatment per flow sheet. Note increased swelling of the L knee today extending into the lower leg and ankle    Assessment:  L knee flexion ROM slightly limited today as compared to previous visit due to increased swelling present today (125, was 130 last visit). Patient consistently reports increased swelling with increased activity level and this also contributed to increased pain; however, she reports the pain intensity is not nearly as severe as it had been in the past and generally does not seem to effect her walking like it had prior to and during the start of her therapy session. Plan: Reassess next session for possible discharge from PT for bilateral knee pain diagnoses.     Treatment Flow:  Patellar mobs all planes grade 3-4 R/L, multiple bouts  STM to quad/hip flexor x5' each  Quad/hip flexor foam roll x3' each EOB Primitivo position   EOB [employer] stretch 2x30\" B  90/90 HSS 2x30\" B  Adductor stretch 2x30\" B  Manual piriformis stretch 2x30\" B  ITB foam roll manually in sdly 2' each  Knee flexion PROM x3' on L      Charges: MAN, 2 ALICJA Total Timed Treatment: 45 minutes   Total Treatment Time: 45 minutes

## 2023-06-22 ENCOUNTER — OFFICE VISIT (OUTPATIENT)
Dept: PHYSICAL THERAPY | Facility: HOSPITAL | Age: 53
End: 2023-06-22
Attending: FAMILY MEDICINE
Payer: MEDICAID

## 2023-06-22 PROCEDURE — 97110 THERAPEUTIC EXERCISES: CPT

## 2023-06-27 ENCOUNTER — TELEMEDICINE (OUTPATIENT)
Dept: FAMILY MEDICINE CLINIC | Facility: CLINIC | Age: 53
End: 2023-06-27

## 2023-06-27 DIAGNOSIS — E66.09 CLASS 1 OBESITY DUE TO EXCESS CALORIES WITH SERIOUS COMORBIDITY AND BODY MASS INDEX (BMI) OF 34.0 TO 34.9 IN ADULT: ICD-10-CM

## 2023-06-27 DIAGNOSIS — R73.03 PREDIABETES: ICD-10-CM

## 2023-06-27 DIAGNOSIS — R53.83 FATIGUE, UNSPECIFIED TYPE: ICD-10-CM

## 2023-06-27 DIAGNOSIS — I87.2 PERIPHERAL VENOUS INSUFFICIENCY: Primary | ICD-10-CM

## 2023-06-27 DIAGNOSIS — H52.7 REFRACTION DISORDER: ICD-10-CM

## 2023-06-27 DIAGNOSIS — I83.90 VARICOSE VEINS: ICD-10-CM

## 2023-06-27 PROCEDURE — 99214 OFFICE O/P EST MOD 30 MIN: CPT | Performed by: FAMILY MEDICINE

## 2023-06-27 RX ORDER — BUPROPION HYDROCHLORIDE 75 MG/1
TABLET ORAL
Qty: 67 TABLET | Refills: 0 | Status: SHIPPED | OUTPATIENT
Start: 2023-06-27 | End: 2023-08-03

## 2023-07-03 ENCOUNTER — TELEPHONE (OUTPATIENT)
Dept: FAMILY MEDICINE CLINIC | Facility: CLINIC | Age: 53
End: 2023-07-03

## 2023-07-03 ENCOUNTER — LAB ENCOUNTER (OUTPATIENT)
Dept: LAB | Facility: HOSPITAL | Age: 53
End: 2023-07-03
Attending: FAMILY MEDICINE
Payer: MEDICAID

## 2023-07-03 DIAGNOSIS — R53.83 FATIGUE, UNSPECIFIED TYPE: ICD-10-CM

## 2023-07-03 DIAGNOSIS — R73.03 PREDIABETES: ICD-10-CM

## 2023-07-03 DIAGNOSIS — I10 ESSENTIAL HYPERTENSION: ICD-10-CM

## 2023-07-03 LAB
ALBUMIN SERPL-MCNC: 3.8 G/DL (ref 3.4–5)
ALBUMIN/GLOB SERPL: 1.2 {RATIO} (ref 1–2)
ALP LIVER SERPL-CCNC: 70 U/L
ALT SERPL-CCNC: 25 U/L
ANION GAP SERPL CALC-SCNC: 6 MMOL/L (ref 0–18)
AST SERPL-CCNC: 22 U/L (ref 15–37)
BASOPHILS # BLD AUTO: 0.06 X10(3) UL (ref 0–0.2)
BASOPHILS NFR BLD AUTO: 1.5 %
BILIRUB SERPL-MCNC: 0.5 MG/DL (ref 0.1–2)
BUN BLD-MCNC: 8 MG/DL (ref 7–18)
CALCIUM BLD-MCNC: 8.8 MG/DL (ref 8.5–10.1)
CHLORIDE SERPL-SCNC: 114 MMOL/L (ref 98–112)
CHOLEST SERPL-MCNC: 183 MG/DL (ref ?–200)
CO2 SERPL-SCNC: 22 MMOL/L (ref 21–32)
CREAT BLD-MCNC: 0.76 MG/DL
EOSINOPHIL # BLD AUTO: 0.11 X10(3) UL (ref 0–0.7)
EOSINOPHIL NFR BLD AUTO: 2.8 %
ERYTHROCYTE [DISTWIDTH] IN BLOOD BY AUTOMATED COUNT: 12.2 %
EST. AVERAGE GLUCOSE BLD GHB EST-MCNC: 117 MG/DL (ref 68–126)
FASTING PATIENT LIPID ANSWER: YES
FASTING STATUS PATIENT QL REPORTED: YES
FOLATE SERPL-MCNC: 16.7 NG/ML (ref 8.7–?)
GFR SERPLBLD BASED ON 1.73 SQ M-ARVRAT: 92 ML/MIN/1.73M2 (ref 60–?)
GLOBULIN PLAS-MCNC: 3.3 G/DL (ref 2.8–4.4)
GLUCOSE BLD-MCNC: 96 MG/DL (ref 70–99)
HBA1C MFR BLD: 5.7 % (ref ?–5.7)
HCT VFR BLD AUTO: 40.4 %
HDLC SERPL-MCNC: 45 MG/DL (ref 40–59)
HGB BLD-MCNC: 13.2 G/DL
IMM GRANULOCYTES # BLD AUTO: 0.01 X10(3) UL (ref 0–1)
IMM GRANULOCYTES NFR BLD: 0.3 %
LDLC SERPL CALC-MCNC: 110 MG/DL (ref ?–100)
LYMPHOCYTES # BLD AUTO: 1.29 X10(3) UL (ref 1–4)
LYMPHOCYTES NFR BLD AUTO: 32.9 %
MCH RBC QN AUTO: 27.8 PG (ref 26–34)
MCHC RBC AUTO-ENTMCNC: 32.7 G/DL (ref 31–37)
MCV RBC AUTO: 85.2 FL
MONOCYTES # BLD AUTO: 0.26 X10(3) UL (ref 0.1–1)
MONOCYTES NFR BLD AUTO: 6.6 %
NEUTROPHILS # BLD AUTO: 2.19 X10 (3) UL (ref 1.5–7.7)
NEUTROPHILS # BLD AUTO: 2.19 X10(3) UL (ref 1.5–7.7)
NEUTROPHILS NFR BLD AUTO: 55.9 %
NONHDLC SERPL-MCNC: 138 MG/DL (ref ?–130)
OSMOLALITY SERPL CALC.SUM OF ELEC: 292 MOSM/KG (ref 275–295)
PLATELET # BLD AUTO: 193 10(3)UL (ref 150–450)
POTASSIUM SERPL-SCNC: 3.9 MMOL/L (ref 3.5–5.1)
PROT SERPL-MCNC: 7.1 G/DL (ref 6.4–8.2)
RBC # BLD AUTO: 4.74 X10(6)UL
SODIUM SERPL-SCNC: 142 MMOL/L (ref 136–145)
TRIGL SERPL-MCNC: 160 MG/DL (ref 30–149)
TSI SER-ACNC: 2.77 MIU/ML (ref 0.36–3.74)
VIT B12 SERPL-MCNC: 457 PG/ML (ref 193–986)
VIT D+METAB SERPL-MCNC: 20 NG/ML (ref 30–100)
VLDLC SERPL CALC-MCNC: 27 MG/DL (ref 0–30)
WBC # BLD AUTO: 3.9 X10(3) UL (ref 4–11)

## 2023-07-03 PROCEDURE — 80061 LIPID PANEL: CPT

## 2023-07-03 PROCEDURE — 82746 ASSAY OF FOLIC ACID SERUM: CPT

## 2023-07-03 PROCEDURE — 83036 HEMOGLOBIN GLYCOSYLATED A1C: CPT

## 2023-07-03 PROCEDURE — 80053 COMPREHEN METABOLIC PANEL: CPT

## 2023-07-03 PROCEDURE — 82607 VITAMIN B-12: CPT

## 2023-07-03 PROCEDURE — 36415 COLL VENOUS BLD VENIPUNCTURE: CPT

## 2023-07-03 PROCEDURE — 82306 VITAMIN D 25 HYDROXY: CPT

## 2023-07-03 PROCEDURE — 84443 ASSAY THYROID STIM HORMONE: CPT

## 2023-07-03 PROCEDURE — 85025 COMPLETE CBC W/AUTO DIFF WBC: CPT

## 2023-07-03 NOTE — TELEPHONE ENCOUNTER
Patient is calling to advise PCP if she can recommend someone else as she has left messages for the following providers and no one returns her calls to schedule appointments per referrals given for:    Dr. Jazmin Castillo, Opthalmology  Dr. Kerry Bedoya  Cardiology    Please advise.

## 2023-07-03 NOTE — TELEPHONE ENCOUNTER
Patient is requesting the following prescription refill and mention she only has 2 dosage remaining; please advise    Metoprolol

## 2023-07-04 NOTE — TELEPHONE ENCOUNTER
LISTED AS HISTORICAL/EXTERNAL ,sent to PCP for approval.     Refill passed per [facility] protocol. Requested Prescriptions   Pending Prescriptions Disp Refills    metoprolol succinate ER 50 MG Oral Tablet 24 Hr 90 tablet 3     Sig: Take 1 tablet (50 mg total) by mouth every morning.        Hypertensive Medications Protocol Passed - 7/3/2023  1:53 PM        Passed - In person appointment in the past 12 or next 3 months     Recent Outpatient Visits              6 days ago Peripheral venous insufficiency    [facility], [address], [place] Octaviano Jensen MD    Telemedicine    1 week ago     [address] PT    Office Visit    1 week ago     [address] PT    Office Visit    2 weeks ago     [address] PT    Office Visit    3 weeks ago     [address] PT    Office Visit          Future Appointments         Provider Department Appt Notes    In 3 days Gaye , [address] Physical Therapy Medicaid    In 1 week Gaye , [address] Physical Therapy Medicaid    In 1 week Gaye , [address] Physical Therapy Medicaid    In 2 weeks Gaye , PT [facility] Physical Therapy Medicaid    In 3 weeks Gaye , [address] Physical Therapy Medicaid    In 4 weeks Gaye , [address] Physical Therapy Medicaid               Passed - Last BP reading less than 140/90     BP Readings from Last 1 Encounters:  04/03/23 : 135/86              Passed - CMP or BMP in past 6 months     Recent Results (from the past 4392 hour(s))   COMP METABOLIC PANEL (14)    Collection Time: 07/03/23  8:40 AM   Result Value Ref Range    Glucose 96 70 - 99 mg/dL    Sodium 142 136 - 145 mmol/L    Potassium 3.9 3.5 - 5.1 mmol/L    Chloride 114 (H) 98 - 112 mmol/L    CO2 22.0 21.0 - 32.0 mmol/L    Anion Gap 6 0 - 18 mmol/L    BUN 8 7 - 18 mg/dL    Creatinine 0.76 0.55 - 1.02 mg/dL    Calcium, Total 8.8 8.5 - 10.1 mg/dL    Calculated Osmolality 292 275 - 295 mOsm/kg    eGFR-Cr 92 >=60 mL/min/1.73m2    AST 22 15 - 37 U/L    ALT 25 13 - 56 U/L    Alkaline Phosphatase 70 41 - 108 U/L    Bilirubin, Total 0.5 0.1 - 2.0 mg/dL    Total Protein 7.1 6.4 - 8.2 g/dL    Albumin 3.8 3.4 - 5.0 g/dL    Globulin  3.3 2.8 - 4.4 g/dL    A/G Ratio 1.2 1.0 - 2.0    Patient Fasting for CMP? Yes      *Note: Due to a large number of results and/or encounters for the requested time period, some results have not been displayed. A complete set of results can be found in Results Review.                Passed - In person appointment or virtual visit in the past 6 months     Recent Outpatient Visits              6 days ago Peripheral venous insufficiency    [facility], [address], [place] Eliana Darby MD    Telemedicine    1 week ago     [address] PT    Office Visit    1 week ago     [address] PT    Office Visit    2 weeks ago     [address] PT    Office Visit    3 weeks ago     [address] Zulema Mead, PT    Office Visit          Future Appointments         Provider Department Appt Notes    In 3 days Zulema Mead, PT [facility] Physical Therapy Medicaid    In 1 week Zulema Mead, 1 [facility] Physical Therapy Medicaid    In 1 week Zulema Mead, 1 [facility] Physical Therapy Medicaid    In 2 weeks Zulema Mead, PT [facility] Physical Therapy Medicaid    In 3 weeks Zulema Mead, PT [facility] Physical Therapy Medicaid    In 4 weeks [place], [facility] Physical Therapy Medicaid               Passed [facility] or GFRNAA > 50     GFR Evaluation  EGFRCR: 92 , resulted on 7/3/2023                Future Appointments         Provider Department Appt Notes    In 3 days Odalis Fields, 1 [facility] Therapy Medicaid    In 1 week Odalis Fields, PT [facility] Physical Therapy Medicaid    In 1 week Odalis Fields, PT [facility] Physical Therapy Medicaid    In 2 weeks Odalis Fields, PT [facility] Physical Therapy Medicaid    In 3 weeks Odalis Fields, PT [facility] Physical Therapy Medicaid    In 4 weeks Odalis Fields, PT [facility] Physical Therapy Medicaid             Recent Outpatient Visits              6 days ago Peripheral venous insufficiency    [facility], [facility], [place] Rogelio Ocampo MD    Telemedicine    1 week ago     [address] PT    Office Visit    1 week ago     [address] PT    Office Visit    2 weeks ago     [address] PT    Office Visit    3 weeks ago     [address] PT    Office Visit

## 2023-07-05 ENCOUNTER — NURSE TRIAGE (OUTPATIENT)
Dept: FAMILY MEDICINE CLINIC | Facility: CLINIC | Age: 53
End: 2023-07-05

## 2023-07-05 RX ORDER — METOPROLOL SUCCINATE 50 MG/1
50 TABLET, EXTENDED RELEASE ORAL EVERY MORNING
Qty: 90 TABLET | Refills: 3 | Status: SHIPPED | OUTPATIENT
Start: 2023-07-05

## 2023-07-05 NOTE — TELEPHONE ENCOUNTER
ER f/u 7/6   Pt was called for test results but she then want to inform Cara Wagner that she has swelling and bruising in the back of both knees and pain is a 4/10 on/off. Pt stated that 6 months ago she had fluid removed from her knees and she just noticed the bruising on Friday. The swelling as been there on/off since she had the fluid removed. Pt denied redness. Today she stated that in the back of her right knee the bruising is not there but she does have some bruising on the left knee. Pt then said that she also has some groin pain on the left side and  swelling. Pt was inform with her history and with her symptoms she needs to go to ER. Pt agreed and she will go to [facility] ER as she lives in [facility]. Pt will go after work.       Reason for Disposition   Nursing judgment    Protocols used: No Protocol [id number]-DX

## 2023-07-05 NOTE — TELEPHONE ENCOUNTER
Please verify the numbers the patient has been using to call, they may have been closed due to the holidays.   She could try again with Dr. Claudio Castillo next week, or if she prefers I can give her a new referral, for Dr. Ernesto Aguayo there are multiple providers in that office so they should be open by now

## 2023-07-05 NOTE — TELEPHONE ENCOUNTER
Tried to reach patient to inform of message below. Left message to call back. Please inform of message below when returns call.

## 2023-07-06 ENCOUNTER — OFFICE VISIT (OUTPATIENT)
Dept: PHYSICAL THERAPY | Facility: HOSPITAL | Age: 53
End: 2023-07-06
Attending: PODIATRIST
Payer: MEDICAID

## 2023-07-06 DIAGNOSIS — M76.62 ACHILLES TENDINITIS OF LEFT LOWER EXTREMITY: ICD-10-CM

## 2023-07-06 DIAGNOSIS — Z86.718 HISTORY OF DEEP VEIN THROMBOSIS (DVT) OF LOWER EXTREMITY: ICD-10-CM

## 2023-07-06 DIAGNOSIS — M65.9 TENOSYNOVITIS OF ANKLE: Primary | ICD-10-CM

## 2023-07-06 DIAGNOSIS — I83.90 VARICOSE VEINS: ICD-10-CM

## 2023-07-06 PROCEDURE — 97140 MANUAL THERAPY 1/> REGIONS: CPT

## 2023-07-06 PROCEDURE — 97161 PT EVAL LOW COMPLEX 20 MIN: CPT

## 2023-07-06 NOTE — TELEPHONE ENCOUNTER
Dr Gerard Cui, [address] [address] [place]    Patient was instructed to go to the ER 23. See Note below under triage assessment. Patient called back. Using language line : Radha Watson ID # [id number]    Patient (name and  verified) states that she did not go to the ER as recommended. Patient states that she did not go yesterday because she did not feel well. States that she has physical therapy today and she will go after that. States that she will go to [facility] because that is the closest to her location.  Patient has physical therapy today at 4pm.

## 2023-07-07 NOTE — TELEPHONE ENCOUNTER
Pt stated that she did not go to ER yesterday as she went to PT and got home and then she had pain in her feet so she took Tylenol and she went to sleep. This morning she woke up and went to work. Pt was inform that the recommendation is for her to go to ER and if she was not going to go to let me know. She then stated that she will go today after work.

## 2023-07-11 ENCOUNTER — OFFICE VISIT (OUTPATIENT)
Dept: PHYSICAL THERAPY | Facility: HOSPITAL | Age: 53
End: 2023-07-11
Attending: PODIATRIST
Payer: MEDICAID

## 2023-07-11 PROCEDURE — 97140 MANUAL THERAPY 1/> REGIONS: CPT

## 2023-07-11 PROCEDURE — 97110 THERAPEUTIC EXERCISES: CPT

## 2023-07-11 NOTE — PROGRESS NOTES
Dx: Tenosynovitis of ankle (M65.9)  Achilles tendinitis of left lower extremity (M76.62)  History of deep vein thrombosis (DVT) of lower extremity (C94.336)  Varicose veins (I83.90)   Authorized # of visit: Medicaid Next MD visits: none  Fall Risk: standard Precautions: none    Date: 7/11/23  Tx #: 2    Current Pain Level: R 2/10; L 4/10       Subjective: Patient reports her feet/ankles have not been too painful today although she did have a lot of work. States she is noticing swelling in the left leg/ankle and the left is more painful than the right currently. Objective: Treatment per flow sheet. Assessment: Issued self gastroc and soleus stretching off the edge of a step as well as runner's calf stretch to HEP today. Focus on soft tissue mobilization, R ankle ROM, and stretching of lower leg musculature with patient tolerating well with no c/o pain. Plan: Continue per plan of care.       Treatment Flow:  Prone STM to calf musculature x5' each R/L  Supine STM to medial/lateral ankle, plantar surface of foot R/L x6' B  R Manual posterior talar glide grade 3-4, multiple bouts  R ankle PROM DF, PF, EVER  Manual gastroc stretch 2x30\" B  Pt edu in HEP per above      Charges: 2MAN, ALICJA Total Timed Treatment: 42 minutes  Total Treatment Time: 42 minutes

## 2023-07-13 ENCOUNTER — OFFICE VISIT (OUTPATIENT)
Dept: PHYSICAL THERAPY | Facility: HOSPITAL | Age: 53
End: 2023-07-13
Attending: PODIATRIST
Payer: MEDICAID

## 2023-07-13 PROCEDURE — 97140 MANUAL THERAPY 1/> REGIONS: CPT

## 2023-07-13 PROCEDURE — 97110 THERAPEUTIC EXERCISES: CPT

## 2023-07-13 NOTE — PROGRESS NOTES
Dx: Tenosynovitis of ankle (M65.9)  Achilles tendinitis of left lower extremity (M76.62)  History of deep vein thrombosis (DVT) of lower extremity (S72.712)  Varicose veins (I83.90)   Authorized # of visit: Medicaid Next MD visits: none  Fall Risk: standard Precautions: none    Date: 7/13/23  Tx #: 3    Current Pain Level: not rated today      Subjective: Patient reports she is achy everywhere today, stating the knees and ankles and shoulders all hurt. Notes increased swelling in the leg today, L>R. States she has had a lot of work the past few days and has been on her feet a lot. Objective: Treatment per flow sheet. Assessment: Incorporated light therex today with patient tolerating well. Notes tightness within the proximal calf with inclined calf stretch but notes relief post stretching. Some difficulty with ankle control for BAPs but performed in a seated position so no c/o pain. Plan: Continue to address soft tissue mobility and flexibility throughout the lower leg/foot/ankle and incorporate strengthening activities as tolerated.       Treatment Flow:  Prone STM to calf musculature x5' each R/L  Supine STM to medial/lateral ankle, plantar surface of foot R/L x6' B  R Manual posterior talar glide grade 3-4, multiple bouts  R ankle PROM DF, PF, EVER  Seated A/P tilt board tap x30  Seated L2 BAPs x15 each cw/ccw R/L  Standing incline board calf stretch 2x30\"      Charges: 2MAN, ALICJA Total Timed Treatment: 45 minutes  Total Treatment Time: 45 minutes

## 2023-07-16 ENCOUNTER — APPOINTMENT (OUTPATIENT)
Dept: ULTRASOUND IMAGING | Facility: HOSPITAL | Age: 53
End: 2023-07-16
Attending: EMERGENCY MEDICINE
Payer: MEDICAID

## 2023-07-16 ENCOUNTER — HOSPITAL ENCOUNTER (EMERGENCY)
Facility: HOSPITAL | Age: 53
Discharge: HOME | End: 2023-07-16
Attending: EMERGENCY MEDICINE
Payer: MEDICAID

## 2023-07-16 VITALS
RESPIRATION RATE: 19 BRPM | OXYGEN SATURATION: 98 % | WEIGHT: 212 LBS | HEIGHT: 66.14 IN | BODY MASS INDEX: 34.07 KG/M2 | DIASTOLIC BLOOD PRESSURE: 89 MMHG | HEART RATE: 68 BPM | SYSTOLIC BLOOD PRESSURE: 130 MMHG | TEMPERATURE: 98 F

## 2023-07-16 DIAGNOSIS — I10 HYPERTENSION, UNSPECIFIED TYPE: Primary | ICD-10-CM

## 2023-07-16 DIAGNOSIS — M79.605 LEFT LEG PAIN: ICD-10-CM

## 2023-07-16 LAB
ALBUMIN SERPL-MCNC: 3.6 G/DL (ref 3.4–5)
ALBUMIN/GLOB SERPL: 1 {RATIO} (ref 1–2)
ALP LIVER SERPL-CCNC: 73 U/L
ALT SERPL-CCNC: 34 U/L
ANION GAP SERPL CALC-SCNC: 4 MMOL/L (ref 0–18)
AST SERPL-CCNC: 36 U/L (ref 15–37)
BASOPHILS # BLD AUTO: 0.08 X10(3) UL (ref 0–0.2)
BASOPHILS NFR BLD AUTO: 1.4 %
BILIRUB SERPL-MCNC: 0.4 MG/DL (ref 0.1–2)
BUN BLD-MCNC: 19 MG/DL (ref 7–18)
CALCIUM BLD-MCNC: 8.7 MG/DL (ref 8.5–10.1)
CHLORIDE SERPL-SCNC: 110 MMOL/L (ref 98–112)
CO2 SERPL-SCNC: 24 MMOL/L (ref 21–32)
CREAT BLD-MCNC: 0.76 MG/DL
EOSINOPHIL # BLD AUTO: 0.18 X10(3) UL (ref 0–0.7)
EOSINOPHIL NFR BLD AUTO: 3 %
ERYTHROCYTE [DISTWIDTH] IN BLOOD BY AUTOMATED COUNT: 12.6 %
GFR SERPLBLD BASED ON 1.73 SQ M-ARVRAT: 92 ML/MIN/1.73M2 (ref 60–?)
GLOBULIN PLAS-MCNC: 3.5 G/DL (ref 2.8–4.4)
GLUCOSE BLD-MCNC: 133 MG/DL (ref 70–99)
HCT VFR BLD AUTO: 40.8 %
HGB BLD-MCNC: 13.6 G/DL
IMM GRANULOCYTES # BLD AUTO: 0.03 X10(3) UL (ref 0–1)
IMM GRANULOCYTES NFR BLD: 0.5 %
LYMPHOCYTES # BLD AUTO: 1.82 X10(3) UL (ref 1–4)
LYMPHOCYTES NFR BLD AUTO: 30.8 %
MAGNESIUM SERPL-MCNC: 2.1 MG/DL (ref 1.6–2.6)
MCH RBC QN AUTO: 29.1 PG (ref 26–34)
MCHC RBC AUTO-ENTMCNC: 33.3 G/DL (ref 31–37)
MCV RBC AUTO: 87.2 FL
MONOCYTES # BLD AUTO: 0.38 X10(3) UL (ref 0.1–1)
MONOCYTES NFR BLD AUTO: 6.4 %
NEUTROPHILS # BLD AUTO: 3.42 X10 (3) UL (ref 1.5–7.7)
NEUTROPHILS # BLD AUTO: 3.42 X10(3) UL (ref 1.5–7.7)
NEUTROPHILS NFR BLD AUTO: 57.9 %
OSMOLALITY SERPL CALC.SUM OF ELEC: 290 MOSM/KG (ref 275–295)
PLATELET # BLD AUTO: 226 10(3)UL (ref 150–450)
POTASSIUM SERPL-SCNC: 4.1 MMOL/L (ref 3.5–5.1)
PROT SERPL-MCNC: 7.1 G/DL (ref 6.4–8.2)
RBC # BLD AUTO: 4.68 X10(6)UL
SODIUM SERPL-SCNC: 138 MMOL/L (ref 136–145)
WBC # BLD AUTO: 5.9 X10(3) UL (ref 4–11)

## 2023-07-16 PROCEDURE — 99285 EMERGENCY DEPT VISIT HI MDM: CPT

## 2023-07-16 PROCEDURE — 85025 COMPLETE CBC W/AUTO DIFF WBC: CPT | Performed by: EMERGENCY MEDICINE

## 2023-07-16 PROCEDURE — 80053 COMPREHEN METABOLIC PANEL: CPT | Performed by: EMERGENCY MEDICINE

## 2023-07-16 PROCEDURE — 83735 ASSAY OF MAGNESIUM: CPT | Performed by: EMERGENCY MEDICINE

## 2023-07-16 PROCEDURE — 93971 EXTREMITY STUDY: CPT | Performed by: EMERGENCY MEDICINE

## 2023-07-16 PROCEDURE — 93010 ELECTROCARDIOGRAM REPORT: CPT

## 2023-07-16 PROCEDURE — 96374 THER/PROPH/DIAG INJ IV PUSH: CPT

## 2023-07-16 PROCEDURE — 93005 ELECTROCARDIOGRAM TRACING: CPT

## 2023-07-16 RX ORDER — LORAZEPAM 2 MG/ML
1 INJECTION INTRAMUSCULAR ONCE
Status: COMPLETED | OUTPATIENT
Start: 2023-07-16 | End: 2023-07-16

## 2023-07-16 RX ORDER — ORPHENADRINE CITRATE 100 MG/1
100 TABLET, EXTENDED RELEASE ORAL 2 TIMES DAILY PRN
Qty: 20 TABLET | Refills: 0 | Status: SHIPPED | OUTPATIENT
Start: 2023-07-16

## 2023-07-16 RX ORDER — ACETAMINOPHEN 500 MG
1000 TABLET ORAL ONCE
Status: COMPLETED | OUTPATIENT
Start: 2023-07-16 | End: 2023-07-16

## 2023-07-17 LAB
ATRIAL RATE: 64 BPM
P AXIS: 22 DEGREES
P-R INTERVAL: 134 MS
Q-T INTERVAL: 382 MS
QRS DURATION: 82 MS
QTC CALCULATION (BEZET): 426 MS
R AXIS: -9 DEGREES
T AXIS: 40 DEGREES
VENTRICULAR RATE: 75 BPM

## 2023-07-17 NOTE — ED INITIAL ASSESSMENT (HPI)
HIstory of high blood pressure on medication but has been elevated for over a week. Patient c/o left groin and leg pain with hx/o blood clot in leg. Patient with cardiac sent placement 8 years ago.

## 2023-07-18 ENCOUNTER — NURSE TRIAGE (OUTPATIENT)
Dept: FAMILY MEDICINE CLINIC | Facility: CLINIC | Age: 53
End: 2023-07-18

## 2023-07-18 NOTE — TELEPHONE ENCOUNTER
Pt stated she was seen at ER, leg , pain, dizzy,head ache, today still dizzy, requesting appt none available , only want Dr Aster Moody, decline another location    Asked if she was monitoring her BP, last night 140/80 , took BP while on phone  119/66, lower than previous OV, stated she was staying hydrated     Still feels dizz/lightheaded, advised to go back for promt eval,verbalized understanding

## 2023-07-19 ENCOUNTER — HOSPITAL ENCOUNTER (EMERGENCY)
Facility: HOSPITAL | Age: 53
Discharge: HOME | End: 2023-07-19
Attending: EMERGENCY MEDICINE
Payer: MEDICAID

## 2023-07-19 ENCOUNTER — APPOINTMENT (OUTPATIENT)
Dept: CT IMAGING | Facility: HOSPITAL | Age: 53
End: 2023-07-19
Payer: MEDICAID

## 2023-07-19 VITALS
TEMPERATURE: 97 F | RESPIRATION RATE: 16 BRPM | DIASTOLIC BLOOD PRESSURE: 95 MMHG | HEART RATE: 71 BPM | BODY MASS INDEX: 34.08 KG/M2 | SYSTOLIC BLOOD PRESSURE: 143 MMHG | OXYGEN SATURATION: 99 % | WEIGHT: 212.06 LBS | HEIGHT: 66 IN

## 2023-07-19 DIAGNOSIS — S00.03XA CONTUSION OF SCALP, INITIAL ENCOUNTER: Primary | ICD-10-CM

## 2023-07-19 DIAGNOSIS — S09.90XA CLOSED HEAD INJURY, INITIAL ENCOUNTER: ICD-10-CM

## 2023-07-19 PROCEDURE — 99284 EMERGENCY DEPT VISIT MOD MDM: CPT

## 2023-07-19 PROCEDURE — 70450 CT HEAD/BRAIN W/O DYE: CPT

## 2023-07-19 NOTE — ED INITIAL ASSESSMENT (HPI)
Heavy base board fell off wall and hit pt in the head, pt was able to get up on her own and felt dizzy when she got up and felt nauseas. Pt denies any LOC. Pt is on apixaban.

## 2023-07-20 ENCOUNTER — OFFICE VISIT (OUTPATIENT)
Dept: PHYSICAL THERAPY | Facility: HOSPITAL | Age: 53
End: 2023-07-20
Attending: PODIATRIST
Payer: MEDICAID

## 2023-07-20 PROCEDURE — 97110 THERAPEUTIC EXERCISES: CPT

## 2023-07-20 PROCEDURE — 97140 MANUAL THERAPY 1/> REGIONS: CPT

## 2023-07-20 NOTE — PROGRESS NOTES
Dx: Tenosynovitis of ankle (M65.9)  Achilles tendinitis of left lower extremity (M76.62)  History of deep vein thrombosis (DVT) of lower extremity (T69.847)  Varicose veins (I83.90)   Authorized # of visit: Medicaid Next MD visits: none  Fall Risk: standard Precautions: none    Date: 7/20/23  Tx #: 4    Current Pain Level: not rated today      Subjective: Patient reports feet and ankles have been \"okay\". Notes they have not been as painful the past several days. Had a head injury yesterday and work but was cleared by the doctor in the ER. Objective: Treatment per flow sheet. Assessment: Patient is demonstrating decreased thickening of the anna's tendons as well as diminished swelling surrounding the anna's tendons and lateral malleolus both R/L today. Patient is demonstrating improved posterior talar glide on L with DF ROM improving to 12 deg today. Plan: Continue per plan of care.     Treatment Flow:  Prone STM to calf musculature x5' each R/L  Supine STM to medial/lateral ankle, plantar surface of foot R/L x6' B  R Manual posterior talar glide grade 3-4, multiple bouts  R ankle PROM DF, PF, EVER  Seated A/P tilt board tap x30  Seated L2 BAPs x15 each cw/ccw R/L  Standing incline board calf stretch 2x30\"      Charges: 2MAN, ALICJA Total Timed Treatment: 45 minutes  Total Treatment Time: 45 minutes

## 2023-07-20 NOTE — DISCHARGE INSTRUCTIONS
Tylenol as needed for pain apply ice 20 minutes 4 times a day follow-up your doctor next 2 days return any problems.

## 2023-07-21 ENCOUNTER — TELEPHONE (OUTPATIENT)
Dept: FAMILY MEDICINE CLINIC | Facility: CLINIC | Age: 53
End: 2023-07-21

## 2023-07-21 NOTE — TELEPHONE ENCOUNTER
Patient indicates she has a scheduled appointment on 8/18 with Dr. Ritchie Records for Interventional Radiology with  Phone [phone], patient does not have their fax. Patient informed she should be able to retrieve through [employer] portal once placed, thanks.   *Patient was not able to schedule with Dr. Jackie Closs and requesting new order/referral

## 2023-07-21 NOTE — TELEPHONE ENCOUNTER
Good Afternoon Dr Eloy Rush and staff    Please review pended referral for Interventional Cardio, Dr Kenna Rojas at [facility] and add DX codes.     Thank you    [facility]

## 2023-07-22 NOTE — TELEPHONE ENCOUNTER
I am not sure this provider does what she was referred for.  Dr Cricket Lua is the only one I know in that practice that manage varicose veins, please inform pt

## 2023-07-24 NOTE — TELEPHONE ENCOUNTER
Left message for patient to call back. Please see provider note below. [id number] today only or RN triage.

## 2023-07-24 NOTE — TELEPHONE ENCOUNTER
Noted, she should then resume tracking her blood pressure and let us know the readings, she can do a weekly log and sent it to my chart

## 2023-07-24 NOTE — TELEPHONE ENCOUNTER
Informed patient of below. States will call back tomorrow to verify if new doctor can see her for varicose veins. She would like Kvng Sanders to know of the following:     Before bp would be around 120/87-70 Is the normal with taking metoprolol in the past  But now has noticed a lower blood pressure  Of 115/60- she has stopped taking metoprolol in the last week. She has been feeling dizzy and decided to hold the blood pressure medicine. And has not checked her blood pressure again. Patient states has also significantly changed her diet and avoiding salt.

## 2023-07-25 ENCOUNTER — OFFICE VISIT (OUTPATIENT)
Dept: PHYSICAL THERAPY | Facility: HOSPITAL | Age: 53
End: 2023-07-25
Attending: PODIATRIST
Payer: MEDICAID

## 2023-07-25 PROCEDURE — 97140 MANUAL THERAPY 1/> REGIONS: CPT

## 2023-07-25 PROCEDURE — 97110 THERAPEUTIC EXERCISES: CPT

## 2023-07-25 NOTE — PROGRESS NOTES
Dx: Tenosynovitis of ankle (M65.9)  Achilles tendinitis of left lower extremity (M76.62)  History of deep vein thrombosis (DVT) of lower extremity (I78.941)  Varicose veins (I83.90)   Authorized # of visit: Medicaid Next MD visits: none  Fall Risk: standard Precautions: none    Date: 7/25/23  Tx #: 5    Current Pain Level: not rated today      Subjective: Patient reports she worked a very long day yesterday and she did not have her \"good\" shoes on and could tell that her feet/ankle were more painful throughout the day and by the end of the day. She states she has noted she is not having as much pain in the plantar surface of her right foot recently with PT and HEP. Objective: Treatment per flow sheet. Assessment: Patient demonstrating decreased soft tissue restriction throughout the plantar fascia bilaterally but most significant on R. No swelling present on R today but noticeable swelling still present throughout left foot, ankle, and lower leg. Patient demonstrating improved excursion throughout functional ROM at the ankle with tilt board tap activity but has moderate difficulty controlling motion through full range with BAPs board. Plan: Continue to address soft tissue mobility throughout the foot and ankle/lower leg.     Treatment Flow:  Prone STM to calf musculature x5' each R/L  Supine STM to medial/lateral ankle, plantar surface of foot R/L x6' B  R Manual posterior talar glide grade 3-4, multiple bouts  R ankle PROM DF, PF, EVER  Seated A/P tilt board tap x30  Seated L2 BAPs x15 each cw/ccw R/L  Standing incline board calf stretch 2x30\"  (+) toe curle marble pick-up 3-direction drop into cup R/L      Charges: 2MAN, ALICJA Total Timed Treatment: 45 minutes  Total Treatment Time: 45 minutes

## 2023-08-01 ENCOUNTER — OFFICE VISIT (OUTPATIENT)
Dept: PHYSICAL THERAPY | Facility: HOSPITAL | Age: 53
End: 2023-08-01
Attending: PODIATRIST
Payer: MEDICAID

## 2023-08-01 PROCEDURE — 97110 THERAPEUTIC EXERCISES: CPT

## 2023-08-01 PROCEDURE — 97140 MANUAL THERAPY 1/> REGIONS: CPT

## 2023-08-01 NOTE — PROGRESS NOTES
Dx: Tenosynovitis of ankle (M65.9)  Achilles tendinitis of left lower extremity (M76.62)  History of deep vein thrombosis (DVT) of lower extremity (O85.353)  Varicose veins (I83.90)   Authorized # of visit: Medicaid Next MD visits: none  Fall Risk: standard Precautions: none    Date: 8/1/23  Tx #: 6    Current Pain Level: not rated today      Subjective: Patient reports she was on her feet all day yesterday working and by the end of the day her feet/ankles were painful, noting left was worse than right with pain mostly around the lateral malleolus. States this past Friday she went to an event and wore shoes with a little heel and this was painful for her left ankle. Objective: Treatment per flow sheet. Ankle AROM: WNL and = B (improved DF ROM on R to 12 deg)  Ankle strength 5/5 throughout B      Assessment: Patient is demonstrating improved ankle ROM to WNL but continues to have soft tissue/muscular tightness throughout the lateral ankle/lower leg (peroneals) as well as the plantar fascia. Patient tolerates therex without c/o pain but notes good stretch with gastroc/soleus stretching on tilt board. Plan: Continue to address soft tissue mobility throughout the foot and ankle/lower leg.     Treatment Flow:  Supine STM to medial/lateral ankle, plantar surface of foot R/L x8' B  R Manual posterior talar glide grade 3-4, multiple bouts  R ankle PROM DF, PF, EVER  Standing A/P tilt board tap x30  Standing L2 BAPs x15 each cw/ccw R/L  Standing incline board calf stretch 2x30\"      Charges: 2MAN, ALICJA Total Timed Treatment: 45 minutes  Total Treatment Time: 45 minutes

## 2023-08-08 ENCOUNTER — APPOINTMENT (OUTPATIENT)
Dept: GENERAL RADIOLOGY | Facility: HOSPITAL | Age: 53
End: 2023-08-08
Attending: EMERGENCY MEDICINE
Payer: MEDICAID

## 2023-08-08 ENCOUNTER — HOSPITAL ENCOUNTER (EMERGENCY)
Facility: HOSPITAL | Age: 53
Discharge: HOME | End: 2023-08-08
Attending: EMERGENCY MEDICINE
Payer: MEDICAID

## 2023-08-08 VITALS
OXYGEN SATURATION: 98 % | TEMPERATURE: 96 F | DIASTOLIC BLOOD PRESSURE: 80 MMHG | HEART RATE: 69 BPM | RESPIRATION RATE: 16 BRPM | SYSTOLIC BLOOD PRESSURE: 146 MMHG

## 2023-08-08 DIAGNOSIS — R07.9 CHEST PAIN WITH LOW RISK FOR CARDIAC ETIOLOGY: Primary | ICD-10-CM

## 2023-08-08 LAB
ALBUMIN SERPL-MCNC: 3.9 G/DL (ref 3.4–5)
ALBUMIN/GLOB SERPL: 1.1 {RATIO} (ref 1–2)
ALP LIVER SERPL-CCNC: 72 U/L
ALT SERPL-CCNC: 32 U/L
ANION GAP SERPL CALC-SCNC: 6 MMOL/L (ref 0–18)
AST SERPL-CCNC: 24 U/L (ref 15–37)
ATRIAL RATE: 63 BPM
BASOPHILS # BLD AUTO: 0.06 X10(3) UL (ref 0–0.2)
BASOPHILS NFR BLD AUTO: 1 %
BILIRUB SERPL-MCNC: 0.7 MG/DL (ref 0.1–2)
BUN BLD-MCNC: 11 MG/DL (ref 7–18)
CALCIUM BLD-MCNC: 9.5 MG/DL (ref 8.5–10.1)
CHLORIDE SERPL-SCNC: 107 MMOL/L (ref 98–112)
CO2 SERPL-SCNC: 26 MMOL/L (ref 21–32)
CREAT BLD-MCNC: 0.77 MG/DL
D DIMER PPP FEU-MCNC: <0.27 UG/ML FEU (ref ?–0.55)
EGFRCR SERPLBLD CKD-EPI 2021: 91 ML/MIN/1.73M2 (ref 60–?)
EOSINOPHIL # BLD AUTO: 0.11 X10(3) UL (ref 0–0.7)
EOSINOPHIL NFR BLD AUTO: 1.9 %
ERYTHROCYTE [DISTWIDTH] IN BLOOD BY AUTOMATED COUNT: 12.6 %
GLOBULIN PLAS-MCNC: 3.4 G/DL (ref 2.8–4.4)
GLUCOSE BLD-MCNC: 95 MG/DL (ref 70–99)
HCT VFR BLD AUTO: 40.4 %
HGB BLD-MCNC: 13.5 G/DL
IMM GRANULOCYTES # BLD AUTO: 0.03 X10(3) UL (ref 0–1)
IMM GRANULOCYTES NFR BLD: 0.5 %
LYMPHOCYTES # BLD AUTO: 1.46 X10(3) UL (ref 1–4)
LYMPHOCYTES NFR BLD AUTO: 24.7 %
MCH RBC QN AUTO: 28.3 PG (ref 26–34)
MCHC RBC AUTO-ENTMCNC: 33.4 G/DL (ref 31–37)
MCV RBC AUTO: 84.7 FL
MONOCYTES # BLD AUTO: 0.37 X10(3) UL (ref 0.1–1)
MONOCYTES NFR BLD AUTO: 6.3 %
NEUTROPHILS # BLD AUTO: 3.89 X10 (3) UL (ref 1.5–7.7)
NEUTROPHILS # BLD AUTO: 3.89 X10(3) UL (ref 1.5–7.7)
NEUTROPHILS NFR BLD AUTO: 65.6 %
OSMOLALITY SERPL CALC.SUM OF ELEC: 287 MOSM/KG (ref 275–295)
P AXIS: 14 DEGREES
P-R INTERVAL: 142 MS
PLATELET # BLD AUTO: 197 10(3)UL (ref 150–450)
POTASSIUM SERPL-SCNC: 3.6 MMOL/L (ref 3.5–5.1)
PROT SERPL-MCNC: 7.3 G/DL (ref 6.4–8.2)
Q-T INTERVAL: 450 MS
QRS DURATION: 82 MS
QTC CALCULATION (BEZET): 460 MS
R AXIS: -8 DEGREES
RBC # BLD AUTO: 4.77 X10(6)UL
SODIUM SERPL-SCNC: 139 MMOL/L (ref 136–145)
T AXIS: 14 DEGREES
TROPONIN I HIGH SENSITIVITY: 6 NG/L
VENTRICULAR RATE: 63 BPM
WBC # BLD AUTO: 5.9 X10(3) UL (ref 4–11)

## 2023-08-08 PROCEDURE — 71045 X-RAY EXAM CHEST 1 VIEW: CPT | Performed by: EMERGENCY MEDICINE

## 2023-08-08 PROCEDURE — 96374 THER/PROPH/DIAG INJ IV PUSH: CPT

## 2023-08-08 PROCEDURE — 99285 EMERGENCY DEPT VISIT HI MDM: CPT

## 2023-08-08 PROCEDURE — 84484 ASSAY OF TROPONIN QUANT: CPT | Performed by: EMERGENCY MEDICINE

## 2023-08-08 PROCEDURE — 93005 ELECTROCARDIOGRAM TRACING: CPT

## 2023-08-08 PROCEDURE — 85025 COMPLETE CBC W/AUTO DIFF WBC: CPT | Performed by: EMERGENCY MEDICINE

## 2023-08-08 PROCEDURE — 85379 FIBRIN DEGRADATION QUANT: CPT | Performed by: EMERGENCY MEDICINE

## 2023-08-08 PROCEDURE — 80053 COMPREHEN METABOLIC PANEL: CPT | Performed by: EMERGENCY MEDICINE

## 2023-08-08 PROCEDURE — 93010 ELECTROCARDIOGRAM REPORT: CPT

## 2023-08-08 RX ORDER — KETOROLAC TROMETHAMINE 15 MG/ML
15 INJECTION, SOLUTION INTRAMUSCULAR; INTRAVENOUS ONCE
Status: COMPLETED | OUTPATIENT
Start: 2023-08-08 | End: 2023-08-08

## 2023-08-08 NOTE — ED INITIAL ASSESSMENT (HPI)
Pt ambulatory to ED w/ c/o right sided chest pain x5 days. Seen in hospital last week s/p head injury. +ivana.  Pt a/ox4

## 2023-08-08 NOTE — ED QUICK NOTES
Rounding Completed. Received patient from waiting room. Patient is alert and oriented, understands some [place] but speaks predominantly [place] Resting in position of comfort. Plan of Care reviewed. Waiting for labs and EKG to result and be seen by ED MD.  Elimination needs assessed. Provided warm blankets. Bed is locked and in lowest position. Call light within reach.

## 2023-08-14 ENCOUNTER — LAB ENCOUNTER (OUTPATIENT)
Dept: LAB | Age: 53
End: 2023-08-14
Attending: FAMILY MEDICINE

## 2023-08-14 ENCOUNTER — OFFICE VISIT (OUTPATIENT)
Dept: FAMILY MEDICINE CLINIC | Facility: CLINIC | Age: 53
End: 2023-08-14

## 2023-08-14 ENCOUNTER — HOSPITAL ENCOUNTER (OUTPATIENT)
Age: 53
Discharge: STILL ADMITTED OR ADMINISTRATIVE | End: 2023-08-14

## 2023-08-14 VITALS
WEIGHT: 216 LBS | DIASTOLIC BLOOD PRESSURE: 88 MMHG | SYSTOLIC BLOOD PRESSURE: 136 MMHG | RESPIRATION RATE: 17 BRPM | HEIGHT: 66 IN | HEART RATE: 68 BPM | BODY MASS INDEX: 34.72 KG/M2

## 2023-08-14 DIAGNOSIS — M79.10 MYALGIA: Primary | ICD-10-CM

## 2023-08-14 DIAGNOSIS — R10.9 ABDOMINAL DISCOMFORT: ICD-10-CM

## 2023-08-14 DIAGNOSIS — M76.62 ACHILLES TENDINITIS OF LEFT LOWER EXTREMITY: ICD-10-CM

## 2023-08-14 DIAGNOSIS — M25.461 BILATERAL KNEE EFFUSIONS: ICD-10-CM

## 2023-08-14 DIAGNOSIS — M79.10 MYALGIA: ICD-10-CM

## 2023-08-14 DIAGNOSIS — I10 ESSENTIAL HYPERTENSION: ICD-10-CM

## 2023-08-14 DIAGNOSIS — M22.2X9 PATELLOFEMORAL PAIN SYNDROME, UNSPECIFIED LATERALITY: ICD-10-CM

## 2023-08-14 DIAGNOSIS — M25.462 BILATERAL KNEE EFFUSIONS: ICD-10-CM

## 2023-08-14 LAB
CK SERPL-CCNC: 84 U/L
CRP SERPL-MCNC: 0.42 MG/DL (ref ?–0.3)
ERYTHROCYTE [SEDIMENTATION RATE] IN BLOOD: 8 MM/HR

## 2023-08-14 PROCEDURE — 86038 ANTINUCLEAR ANTIBODIES: CPT

## 2023-08-14 PROCEDURE — 85652 RBC SED RATE AUTOMATED: CPT

## 2023-08-14 PROCEDURE — 36415 COLL VENOUS BLD VENIPUNCTURE: CPT

## 2023-08-14 PROCEDURE — 86225 DNA ANTIBODY NATIVE: CPT

## 2023-08-14 PROCEDURE — 3079F DIAST BP 80-89 MM HG: CPT | Performed by: FAMILY MEDICINE

## 2023-08-14 PROCEDURE — 86140 C-REACTIVE PROTEIN: CPT

## 2023-08-14 PROCEDURE — 82550 ASSAY OF CK (CPK): CPT

## 2023-08-14 PROCEDURE — 3008F BODY MASS INDEX DOCD: CPT | Performed by: FAMILY MEDICINE

## 2023-08-14 PROCEDURE — 99214 OFFICE O/P EST MOD 30 MIN: CPT | Performed by: FAMILY MEDICINE

## 2023-08-14 PROCEDURE — 3075F SYST BP GE 130 - 139MM HG: CPT | Performed by: FAMILY MEDICINE

## 2023-08-14 RX ORDER — METHOCARBAMOL 500 MG/1
500 TABLET, FILM COATED ORAL 3 TIMES DAILY
Qty: 30 TABLET | Refills: 0 | Status: SHIPPED | OUTPATIENT
Start: 2023-08-14

## 2023-08-14 RX ORDER — METHYLPREDNISOLONE 4 MG/1
TABLET ORAL
Qty: 21 EACH | Refills: 0 | Status: SHIPPED | OUTPATIENT
Start: 2023-08-14

## 2023-08-14 RX ORDER — DICYCLOMINE HYDROCHLORIDE 10 MG/1
10 CAPSULE ORAL 3 TIMES DAILY PRN
Qty: 40 CAPSULE | Refills: 0 | Status: SHIPPED | OUTPATIENT
Start: 2023-08-14 | End: 2023-08-24

## 2023-08-15 ENCOUNTER — TELEPHONE (OUTPATIENT)
Dept: FAMILY MEDICINE CLINIC | Facility: CLINIC | Age: 53
End: 2023-08-15

## 2023-08-15 NOTE — TELEPHONE ENCOUNTER
Using language line  ID number [id number]    Result still in process/not reviewed. Patient notified, verbalized understanding.

## 2023-08-16 LAB
DSDNA IGG SERPL IA-ACNC: 1.7 IU/ML
ENA AB SER QL IA: 0.1 UG/L
ENA AB SER QL IA: NEGATIVE

## 2023-08-18 ENCOUNTER — TELEPHONE (OUTPATIENT)
Dept: FAMILY MEDICINE CLINIC | Facility: CLINIC | Age: 53
End: 2023-08-18

## 2023-08-18 NOTE — TELEPHONE ENCOUNTER
Patient is calling to advise she saw her cardiologist and was advised it is not an emergency, it appears to be muscular. Patient states she called Pcp's office today and was advised to go to ER but she saw the following provider:   Please advise.       Dr. Naomi Pollard, [facility]  Cardiology   Phone #[phone]

## 2023-08-21 ENCOUNTER — TELEPHONE (OUTPATIENT)
Dept: INTERVENTIONAL RADIOLOGY/VASCULAR | Facility: HOSPITAL | Age: 53
End: 2023-08-21

## 2023-08-22 ENCOUNTER — APPOINTMENT (OUTPATIENT)
Dept: GENERAL RADIOLOGY | Facility: HOSPITAL | Age: 53
End: 2023-08-22
Attending: EMERGENCY MEDICINE
Payer: MEDICAID

## 2023-08-22 ENCOUNTER — APPOINTMENT (OUTPATIENT)
Dept: ULTRASOUND IMAGING | Facility: HOSPITAL | Age: 53
End: 2023-08-22
Attending: EMERGENCY MEDICINE
Payer: MEDICAID

## 2023-08-22 ENCOUNTER — HOSPITAL ENCOUNTER (EMERGENCY)
Facility: HOSPITAL | Age: 53
Discharge: HOME | End: 2023-08-22
Attending: EMERGENCY MEDICINE
Payer: MEDICAID

## 2023-08-22 ENCOUNTER — NURSE TRIAGE (OUTPATIENT)
Dept: FAMILY MEDICINE CLINIC | Facility: CLINIC | Age: 53
End: 2023-08-22

## 2023-08-22 VITALS
TEMPERATURE: 98 F | BODY MASS INDEX: 34.23 KG/M2 | HEART RATE: 61 BPM | DIASTOLIC BLOOD PRESSURE: 98 MMHG | RESPIRATION RATE: 20 BRPM | HEIGHT: 66.14 IN | OXYGEN SATURATION: 100 % | WEIGHT: 213 LBS | SYSTOLIC BLOOD PRESSURE: 143 MMHG

## 2023-08-22 DIAGNOSIS — M54.16 LUMBAR RADICULOPATHY: Primary | ICD-10-CM

## 2023-08-22 DIAGNOSIS — M54.40 BACK PAIN OF LUMBAR REGION WITH SCIATICA: ICD-10-CM

## 2023-08-22 LAB
ALBUMIN SERPL-MCNC: 3.3 G/DL (ref 3.4–5)
ALBUMIN/GLOB SERPL: 1 {RATIO} (ref 1–2)
ALP LIVER SERPL-CCNC: 72 U/L
ALT SERPL-CCNC: 25 U/L
ANION GAP SERPL CALC-SCNC: 4 MMOL/L (ref 0–18)
AST SERPL-CCNC: 21 U/L (ref 15–37)
ATRIAL RATE: 61 BPM
BASOPHILS # BLD AUTO: 0.08 X10(3) UL (ref 0–0.2)
BASOPHILS NFR BLD AUTO: 1.1 %
BILIRUB SERPL-MCNC: 0.3 MG/DL (ref 0.1–2)
BUN BLD-MCNC: 12 MG/DL (ref 7–18)
CALCIUM BLD-MCNC: 8.4 MG/DL (ref 8.5–10.1)
CHLORIDE SERPL-SCNC: 107 MMOL/L (ref 98–112)
CO2 SERPL-SCNC: 27 MMOL/L (ref 21–32)
CREAT BLD-MCNC: 0.66 MG/DL
EGFRCR SERPLBLD CKD-EPI 2021: 104 ML/MIN/1.73M2 (ref 60–?)
EOSINOPHIL # BLD AUTO: 0.16 X10(3) UL (ref 0–0.7)
EOSINOPHIL NFR BLD AUTO: 2.2 %
ERYTHROCYTE [DISTWIDTH] IN BLOOD BY AUTOMATED COUNT: 12.8 %
GLOBULIN PLAS-MCNC: 3.2 G/DL (ref 2.8–4.4)
GLUCOSE BLD-MCNC: 94 MG/DL (ref 70–99)
HCT VFR BLD AUTO: 39.9 %
HGB BLD-MCNC: 13.4 G/DL
IMM GRANULOCYTES # BLD AUTO: 0.05 X10(3) UL (ref 0–1)
IMM GRANULOCYTES NFR BLD: 0.7 %
LYMPHOCYTES # BLD AUTO: 1.99 X10(3) UL (ref 1–4)
LYMPHOCYTES NFR BLD AUTO: 27.7 %
MCH RBC QN AUTO: 28.6 PG (ref 26–34)
MCHC RBC AUTO-ENTMCNC: 33.6 G/DL (ref 31–37)
MCV RBC AUTO: 85.3 FL
MONOCYTES # BLD AUTO: 0.58 X10(3) UL (ref 0.1–1)
MONOCYTES NFR BLD AUTO: 8.1 %
NEUTROPHILS # BLD AUTO: 4.32 X10 (3) UL (ref 1.5–7.7)
NEUTROPHILS # BLD AUTO: 4.32 X10(3) UL (ref 1.5–7.7)
NEUTROPHILS NFR BLD AUTO: 60.2 %
OSMOLALITY SERPL CALC.SUM OF ELEC: 286 MOSM/KG (ref 275–295)
P AXIS: 14 DEGREES
P-R INTERVAL: 134 MS
PLATELET # BLD AUTO: 195 10(3)UL (ref 150–450)
POTASSIUM SERPL-SCNC: 3.8 MMOL/L (ref 3.5–5.1)
PROT SERPL-MCNC: 6.5 G/DL (ref 6.4–8.2)
Q-T INTERVAL: 390 MS
QRS DURATION: 82 MS
QTC CALCULATION (BEZET): 392 MS
R AXIS: -1 DEGREES
RBC # BLD AUTO: 4.68 X10(6)UL
SODIUM SERPL-SCNC: 138 MMOL/L (ref 136–145)
T AXIS: 1 DEGREES
TROPONIN I HIGH SENSITIVITY: 4 NG/L
VENTRICULAR RATE: 61 BPM
WBC # BLD AUTO: 7.2 X10(3) UL (ref 4–11)

## 2023-08-22 PROCEDURE — 99285 EMERGENCY DEPT VISIT HI MDM: CPT

## 2023-08-22 PROCEDURE — 80053 COMPREHEN METABOLIC PANEL: CPT | Performed by: EMERGENCY MEDICINE

## 2023-08-22 PROCEDURE — 96374 THER/PROPH/DIAG INJ IV PUSH: CPT

## 2023-08-22 PROCEDURE — 93005 ELECTROCARDIOGRAM TRACING: CPT

## 2023-08-22 PROCEDURE — 84484 ASSAY OF TROPONIN QUANT: CPT | Performed by: EMERGENCY MEDICINE

## 2023-08-22 PROCEDURE — 85025 COMPLETE CBC W/AUTO DIFF WBC: CPT | Performed by: EMERGENCY MEDICINE

## 2023-08-22 PROCEDURE — 93010 ELECTROCARDIOGRAM REPORT: CPT

## 2023-08-22 PROCEDURE — 72110 X-RAY EXAM L-2 SPINE 4/>VWS: CPT | Performed by: EMERGENCY MEDICINE

## 2023-08-22 PROCEDURE — 93971 EXTREMITY STUDY: CPT | Performed by: EMERGENCY MEDICINE

## 2023-08-22 RX ORDER — KETOROLAC TROMETHAMINE 15 MG/ML
15 INJECTION, SOLUTION INTRAMUSCULAR; INTRAVENOUS ONCE
Status: COMPLETED | OUTPATIENT
Start: 2023-08-22 | End: 2023-08-22

## 2023-08-22 RX ORDER — TRAMADOL HYDROCHLORIDE 50 MG/1
TABLET ORAL EVERY 6 HOURS PRN
Qty: 10 TABLET | Refills: 0 | Status: SHIPPED | OUTPATIENT
Start: 2023-08-22 | End: 2023-08-27

## 2023-08-22 RX ORDER — CYCLOBENZAPRINE HCL 10 MG
10 TABLET ORAL 3 TIMES DAILY PRN
Qty: 20 TABLET | Refills: 0 | Status: SHIPPED | OUTPATIENT
Start: 2023-08-22 | End: 2023-08-29

## 2023-08-22 NOTE — TELEPHONE ENCOUNTER
Action Requested: Summary for Provider     []  Critical Lab, Recommendations Needed  [] Need Additional Advice  [x]   FYI    []   Need Orders  [] Need Medications Sent to Pharmacy  []  Other     SUMMARY: With  ID # [id number]    Patient called asking for a CT of her hip and leg to be ordered by Shazai Olivo. Patient states her chest pain now travelled to her left leg and hip. Painful to put weight on leg. Patient advised an office appointment for an evaluation, since she has not been seen  for this problem. Patient states she will go to [facility], since pain is so bad. Patient disconnected call.      Reason for call: Leg Pain  Onset: today    Reason for Disposition   SEVERE pain (e.g., excruciating, unable to do any normal activities)    Protocols used: Hip Pain-A-OH

## 2023-08-22 NOTE — ED INITIAL ASSESSMENT (HPI)
Pt reports left leg pain. Hx of vascular disease in the leg with stent placement. Pt reports pain and swelling in the left lower leg.

## 2023-08-22 NOTE — TELEPHONE ENCOUNTER
Using language line : Aldo Plate ID number [id number]    Patient called back in asking to schedule visit. No available appointments with Dr. Aster Moody, patient declined visit with another provider and states she will go to the ER.

## 2023-08-31 ENCOUNTER — TELEMEDICINE (OUTPATIENT)
Dept: FAMILY MEDICINE CLINIC | Facility: CLINIC | Age: 53
End: 2023-08-31

## 2023-08-31 DIAGNOSIS — R39.9 URINARY TRACT INFECTION SYMPTOMS: ICD-10-CM

## 2023-08-31 DIAGNOSIS — T82.858A: ICD-10-CM

## 2023-08-31 DIAGNOSIS — M51.36 DEGENERATIVE DISC DISEASE, LUMBAR: Primary | ICD-10-CM

## 2023-08-31 RX ORDER — GABAPENTIN 100 MG/1
CAPSULE ORAL
Qty: 87 CAPSULE | Refills: 0 | Status: SHIPPED | OUTPATIENT
Start: 2023-08-31 | End: 2023-09-30

## 2023-08-31 RX ORDER — ROSUVASTATIN CALCIUM 5 MG/1
5 TABLET, COATED ORAL NIGHTLY
Qty: 90 TABLET | Refills: 0 | Status: SHIPPED | OUTPATIENT
Start: 2023-08-31 | End: 2023-09-02

## 2023-09-01 ENCOUNTER — TELEPHONE (OUTPATIENT)
Dept: FAMILY MEDICINE CLINIC | Facility: CLINIC | Age: 53
End: 2023-09-01

## 2023-09-01 NOTE — TELEPHONE ENCOUNTER
Patient calling to state pharmacy would not give rosuvastatin to patient, as stated pharmacy was listed incorrectly.  Patient verified pharmacy should be [address] in [place],

## 2023-09-01 NOTE — TELEPHONE ENCOUNTER
[facility] that the rosuvastatin and the gabapentin are not going through on patient's insurance-indicates plan limitation. Not listing anything else. Ben advised that this is the first time both Rx's have been prescribed and sent to correct pharmacy- has not been sent anywhere else. Wanted to know if alternative can be prescribed or see if can do a prior authorization?

## 2023-09-02 RX ORDER — ATORVASTATIN CALCIUM 10 MG/1
10 TABLET, FILM COATED ORAL NIGHTLY
Qty: 90 TABLET | Refills: 0 | Status: SHIPPED | OUTPATIENT
Start: 2023-09-02

## 2023-09-04 ENCOUNTER — LAB ENCOUNTER (OUTPATIENT)
Dept: LAB | Facility: HOSPITAL | Age: 53
End: 2023-09-04
Attending: FAMILY MEDICINE
Payer: MEDICAID

## 2023-09-04 DIAGNOSIS — R39.9 URINARY TRACT INFECTION SYMPTOMS: ICD-10-CM

## 2023-09-04 LAB
BILIRUB UR QL STRIP.AUTO: NEGATIVE
CLARITY UR REFRACT.AUTO: CLEAR
GLUCOSE UR STRIP.AUTO-MCNC: NORMAL MG/DL
KETONES UR STRIP.AUTO-MCNC: NEGATIVE MG/DL
LEUKOCYTE ESTERASE UR QL STRIP.AUTO: 500
NITRITE UR QL STRIP.AUTO: NEGATIVE
PH UR STRIP.AUTO: 5 [PH] (ref 5–8)
PROT UR STRIP.AUTO-MCNC: NEGATIVE MG/DL
RBC UR QL AUTO: NEGATIVE
SP GR UR STRIP.AUTO: 1.01 (ref 1–1.03)
UROBILINOGEN UR STRIP.AUTO-MCNC: NORMAL MG/DL

## 2023-09-04 PROCEDURE — 87086 URINE CULTURE/COLONY COUNT: CPT

## 2023-09-04 PROCEDURE — 87186 SC STD MICRODIL/AGAR DIL: CPT

## 2023-09-04 PROCEDURE — 81001 URINALYSIS AUTO W/SCOPE: CPT

## 2023-09-04 PROCEDURE — 87077 CULTURE AEROBIC IDENTIFY: CPT

## 2023-09-05 ENCOUNTER — TELEPHONE (OUTPATIENT)
Dept: FAMILY MEDICINE CLINIC | Facility: CLINIC | Age: 53
End: 2023-09-05

## 2023-09-05 RX ORDER — NITROFURANTOIN 25; 75 MG/1; MG/1
100 CAPSULE ORAL 2 TIMES DAILY
Qty: 20 CAPSULE | Refills: 0 | Status: SHIPPED | OUTPATIENT
Start: 2023-09-05 | End: 2023-09-06

## 2023-09-05 NOTE — TELEPHONE ENCOUNTER
Using language line : Branden Kearney ID number [id number]    Call was dropped mid-call. Using language line : Bladimir Ash ID number [id number]    Dr. Kai Espana: Patient notified of abnormal urinalysis and culture in process. Per patient she does have some intermittent burning with urination. See results below:    Verified preferred pharmacy and patient allergies. URINALYSIS WITH CULTURE REFLEX  Order: [id number]  Collected 9/4/2023  8:55 AM       Status: Final result       Dx: Urinary tract infection symptoms    Specimen Information: Urine, clean catch   0 Result Notes      Component  Ref Range & Units 9/4/23  8:55 AM   Urine Color  Yellow Light-Yellow   Clarity Urine  Clear Clear   Spec Gravity  1.005 - 1.030 1.014   Glucose Urine  Normal mg/dL Normal   Bilirubin Urine  Negative Negative   Ketones Urine  Negative mg/dL Negative   Blood Urine  Negative Negative   pH Urine  5.0 - 8.0 5.0   Protein Urine  Negative mg/dL Negative   Urobilinogen Urine  Normal mg/dL Normal   Nitrite Urine  Negative Negative   Leukocyte Esterase Urine  Negative 500 Abnormal    Comment:  Skyla/uL Interpretation  25          Trace  75          1+  250         2+  500         3+   WBC Urine  0 - 5 /HPF 11-20 Abnormal    RBC Urine  0 - 2 /HPF 0-2   Bacteria Urine  None Seen /HPF Rare Abnormal    Squamous Epi.  Cells  None Seen /HPF Few Abnormal    Renal Tubular Epithelial Cells  None Seen /HPF None Seen   Transitional Cells  None Seen /HPF None Seen   Yeast Urine  None Seen /HPF None Seen   Resulting Agency [facility])

## 2023-09-05 NOTE — TELEPHONE ENCOUNTER
Prior authorization initiated for,await 1-5 days for decision     Gabapentin 100 mg  #87 DxM79.10  PT QF#[id number]  Ph# [phone]  spoke with Kim Doherty  Last fill 8/31/23 \"Max script\"

## 2023-09-05 NOTE — TELEPHONE ENCOUNTER
[address] [facility], [address], [place]  Patient notified, stated understanding to take macrobid twice a day for 10 days. Asked if OK to take with Eliquis? Per Up to Date, no drug interaction noted, informed patient she can also ask her pharmacist.  Patient stated understanding.

## 2023-09-05 NOTE — TELEPHONE ENCOUNTER
Spoke with the patient,verified full name and     Patient is having pain,    Advised her to increase fluid, take tylenol for pain, and reviewed red flags    Pharmacy verified

## 2023-09-05 NOTE — TELEPHONE ENCOUNTER
Please inform patient results do suggest that she has a UTI but I do not have sensitivities yet, if she is having mild symptoms we can wait until we have the final sensitivity and I will be sending the prescription in the next 1 to 2 days

## 2023-09-06 ENCOUNTER — NURSE TRIAGE (OUTPATIENT)
Dept: FAMILY MEDICINE CLINIC | Facility: CLINIC | Age: 53
End: 2023-09-06

## 2023-09-06 RX ORDER — SULFAMETHOXAZOLE AND TRIMETHOPRIM 800; 160 MG/1; MG/1
1 TABLET ORAL 2 TIMES DAILY
Qty: 10 TABLET | Refills: 0 | Status: SHIPPED | OUTPATIENT
Start: 2023-09-06 | End: 2023-09-11

## 2023-09-06 NOTE — TELEPHONE ENCOUNTER
Agree with recommendation, if she is having significant abdominal pain with changes in the stool she should be evaluated in the ER

## 2023-09-06 NOTE — TELEPHONE ENCOUNTER
Action Requested: Summary for Provider     []  Critical Lab, Recommendations Needed  [x] Need Additional Advice  []   FYI    []   Need Orders  [] Need Medications Sent to Pharmacy  []  Other     SUMMARY:  Pt reporting her stool is hard and half black and half yellow. Reason for call: Acute    Kiley Campos Pt was called to give her test results. Pt then wanted me to inform you that today her stool was half black and half yellow  and her stool was hard . She did not see any bright red blood on it. Pt stated she does not feel like she has a lot of energy. Pt has not taken any iron or pepto bismol. When asked if she has any abd pain Pt stated that she does have left inguinal pain but she has been having for a few months now the pain can be a  9/10 on/off. Per pt Dr. Angeli William is aware of this. Pt denied any  fever or vomiting sometimes she feels nauseous. Pt was inform if she is having black stools she needs to go to ER for a evaluation, Pt stated that she can not go to the ER as she is at work. Pt will see how she does after work and depending on how she feel she will see if she will go to ER. Kiley Campos do you have other recommendations?       Reason for Disposition   Nursing judgment    Protocols used: No Protocol Twfrpecwn-F-PH

## 2023-09-07 ENCOUNTER — TELEPHONE (OUTPATIENT)
Dept: FAMILY MEDICINE CLINIC | Facility: CLINIC | Age: 53
End: 2023-09-07

## 2023-09-07 NOTE — TELEPHONE ENCOUNTER
Spoke to patient and informed of doctor's result note below. Pt verbalized understanding. Patient states has also not received atorvastatin. Informed script was sent on 9/2 to [facility] in [place].   Pt states will go to pharmacy today

## 2023-09-07 NOTE — TELEPHONE ENCOUNTER
Pt stated that she did not go to the ER as her stools are yellow now and not black. She will continue to monitor her s/sx. Again in form pt if abd pain returnd or she has the black stools she needs to go to the ER pt verbalized understanding.

## 2023-09-07 NOTE — TELEPHONE ENCOUNTER
With Language Line  Kishore Misael   ID # [id number]    Patient calling ( identified name and  ) asking about her antibiotics Bactrim  she has taken 2 doses with any problems    Advised to take with food and drink plenty of water      Dr. Aster Moody ---Patient is asking if the Bactrim has any interaction with her Apixaban   ( pharmacist would  not answer her question, directed her to PCP )    Please advise and thank you. Patient also mentioned the Atorvastatin was sent and the  [facility] pharmacy says the medication was picked up but she did not receive it     Called [facility]  spoke with Ashwin Gonzales  states  Medicaid will  [facility] cover so much per month \"  and patient and was told to call her insurance,  as of now Medicaid will not cover the cost for the Atorvastatin. The [facility] suggested to have a prior auth done for the medication. [facility] is also  trying to contact the insurance  to see why there is a limitation per month     Triage support please assist with above  and thank you      Patient advised we may be calling back with an  . The number will not be listed as our office, so please answer the call.   Patient agrees to answer the call     Best call back number: Zachariah Peabody  at [phone]

## 2023-09-07 NOTE — TELEPHONE ENCOUNTER
Called stating that they contacted the patient insurance and stated to visit [employer] website and view coverage details. The patient has exceeded refills.

## 2023-09-11 ENCOUNTER — TELEPHONE (OUTPATIENT)
Dept: FAMILY MEDICINE CLINIC | Facility: CLINIC | Age: 53
End: 2023-09-11

## 2023-09-11 NOTE — TELEPHONE ENCOUNTER
Kira Blood, [address] [facility] Certified Medical Assistant Signed  9/5/2023     Copy     Prior authorization initiated for,await 1-5 days for decision      Gabapentin 100 mg  #87 DxM79.10  PT TC#[id number]  Ph# [phone]  spoke with Arin Dunbar  Last fill 8/31/23 \"Max script\"

## 2023-09-13 ENCOUNTER — NURSE TRIAGE (OUTPATIENT)
Dept: FAMILY MEDICINE CLINIC | Facility: CLINIC | Age: 53
End: 2023-09-13

## 2023-09-13 ENCOUNTER — HOSPITAL ENCOUNTER (OUTPATIENT)
Age: 53
Discharge: HOME | End: 2023-09-13
Payer: MEDICAID

## 2023-09-13 VITALS
DIASTOLIC BLOOD PRESSURE: 97 MMHG | RESPIRATION RATE: 16 BRPM | TEMPERATURE: 98 F | SYSTOLIC BLOOD PRESSURE: 136 MMHG | HEART RATE: 72 BPM | OXYGEN SATURATION: 98 %

## 2023-09-13 DIAGNOSIS — J39.2 THROAT IRRITATION: ICD-10-CM

## 2023-09-13 DIAGNOSIS — T18.9XXA SWALLOWED FOREIGN BODY, INITIAL ENCOUNTER: ICD-10-CM

## 2023-09-13 DIAGNOSIS — R39.198 SUBJECTIVE CHANGE IN URINATION: Primary | ICD-10-CM

## 2023-09-13 DIAGNOSIS — H69.92 EUSTACHIAN TUBE DYSFUNCTION, LEFT: Primary | ICD-10-CM

## 2023-09-13 DIAGNOSIS — R19.5 CHANGE IN STOOL: ICD-10-CM

## 2023-09-13 PROCEDURE — 99213 OFFICE O/P EST LOW 20 MIN: CPT | Performed by: PHYSICIAN ASSISTANT

## 2023-09-15 ENCOUNTER — LAB ENCOUNTER (OUTPATIENT)
Dept: LAB | Facility: HOSPITAL | Age: 53
End: 2023-09-15
Attending: FAMILY MEDICINE
Payer: MEDICAID

## 2023-09-15 DIAGNOSIS — R39.198 SUBJECTIVE CHANGE IN URINATION: ICD-10-CM

## 2023-09-15 DIAGNOSIS — R19.5 CHANGE IN STOOL: ICD-10-CM

## 2023-09-15 PROCEDURE — 87427 SHIGA-LIKE TOXIN AG IA: CPT

## 2023-09-15 PROCEDURE — 87046 STOOL CULTR AEROBIC BACT EA: CPT

## 2023-09-15 PROCEDURE — 87045 FECES CULTURE AEROBIC BACT: CPT

## 2023-09-15 PROCEDURE — 87086 URINE CULTURE/COLONY COUNT: CPT

## 2023-09-15 PROCEDURE — 87272 CRYPTOSPORIDIUM AG IF: CPT

## 2023-09-15 PROCEDURE — 87329 GIARDIA AG IA: CPT

## 2023-09-16 LAB
CRYPTOSP AG STL QL IA: NEGATIVE
G LAMBLIA AG STL QL IA: NEGATIVE

## 2023-09-22 ENCOUNTER — NURSE TRIAGE (OUTPATIENT)
Dept: FAMILY MEDICINE CLINIC | Facility: CLINIC | Age: 53
End: 2023-09-22

## 2023-09-22 NOTE — TELEPHONE ENCOUNTER
Language line [employer] ID # [id number], Identified patient's name and . Patient said that she is still having abdominal pain and she would like testing. She would like urine testing. Relayed that her urine and stool testing one week ago was normal.     She asked to only see Dr. Angeli William. Appointment scheduled for next Wednesday. She might need to cancel if work will not give her time off. Future Appointments   Date Time Provider Kendall Villagran   2023  1:15 PM Elsa Brewer MD [facility] [place]   10/25/2023  4:00 PM DO NATHALIE Milan [facility] [place]     Rn urged her to go to ER if any worsening/severe symptoms.

## 2023-10-03 ENCOUNTER — OFFICE VISIT (OUTPATIENT)
Facility: CLINIC | Age: 53
End: 2023-10-03

## 2023-10-03 VITALS
HEIGHT: 66.14 IN | HEART RATE: 77 BPM | SYSTOLIC BLOOD PRESSURE: 156 MMHG | DIASTOLIC BLOOD PRESSURE: 91 MMHG | BODY MASS INDEX: 33.27 KG/M2 | WEIGHT: 207 LBS

## 2023-10-03 DIAGNOSIS — R10.32 ABDOMINAL PAIN, CHRONIC, LEFT LOWER QUADRANT: Primary | ICD-10-CM

## 2023-10-03 DIAGNOSIS — G89.29 ABDOMINAL PAIN, CHRONIC, LEFT LOWER QUADRANT: Primary | ICD-10-CM

## 2023-10-03 PROCEDURE — 3077F SYST BP >= 140 MM HG: CPT

## 2023-10-03 PROCEDURE — 99214 OFFICE O/P EST MOD 30 MIN: CPT

## 2023-10-03 PROCEDURE — 3080F DIAST BP >= 90 MM HG: CPT

## 2023-10-03 PROCEDURE — 3008F BODY MASS INDEX DOCD: CPT
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
## 2023-10-04 ENCOUNTER — TELEPHONE (OUTPATIENT)
Dept: FAMILY MEDICINE CLINIC | Facility: CLINIC | Age: 53
End: 2023-10-04

## 2023-10-04 NOTE — TELEPHONE ENCOUNTER
Patient wants Dr Kurtz to know that she saw the Cardiologist today Dr Bhavesh Chun, and was told that he is not able to help her and he has referred her to see another specialist.

## 2023-10-04 NOTE — TELEPHONE ENCOUNTER
Per patient, Dr. Chun recommended patient follow up with Dr. Veto Joe or Dr. Juancho Bone. Jose is requesting to have Dr. Kurtz verify if either of the providers are in network for her. Per patient, Dr. Kurtz has helped her confirm if a provider was in network for her. Please advise

## 2023-10-05 NOTE — TELEPHONE ENCOUNTER
Attempted to search providers below via \"Cardio external\" , providers not found. Patient to f/u with insurance    Managed care please advise if you are able to assist

## 2023-10-05 NOTE — TELEPHONE ENCOUNTER
Good Morning     does not have access to Medicaid and what specialist are within patients specific health plan.    Patient needs to call customer service phone number on back of her insurance card and they can assist her in finding a Cardiologist near her.     does not obtain PA for referrals for Medicaid plan.  It is patients responsibility to confirm if specialist is within network and accepts their health plan.  Is an order only.    Thank you    Airam  [facility]

## 2023-10-06 NOTE — TELEPHONE ENCOUNTER
GARTHM in Pakistani informing patient she needs to contact insurance to help her see who is in nertwork for her referrals. Callback number provided for any questions.

## 2023-10-19 ENCOUNTER — TELEPHONE (OUTPATIENT)
Dept: FAMILY MEDICINE CLINIC | Facility: CLINIC | Age: 53
End: 2023-10-19

## 2023-10-19 NOTE — TELEPHONE ENCOUNTER
Unable to pull notes from  at  via care Everywhere.  Left message at Interventional Radiology at  requesting last office notes to be faxed to [place] for .

## 2023-10-19 NOTE — TELEPHONE ENCOUNTER
Patient is calling to update PCP regarding her visit with the cardiologist.      Dr. Juancho Bone,

## 2023-10-19 NOTE — TELEPHONE ENCOUNTER
The last available note that I have is from Dr. Chun in care everywhere, I do not see any notes from Dr. Bone, can we contact the office to obtain the note? Thanks

## 2023-10-23 ENCOUNTER — TELEPHONE (OUTPATIENT)
Dept: FAMILY MEDICINE CLINIC | Facility: CLINIC | Age: 53
End: 2023-10-23

## 2023-10-23 NOTE — TELEPHONE ENCOUNTER
Reviewed notes from Dr. Bone interventional and vascular radiology, per notes they are recommending for patient recanalization with a placement of a new with stent, per the notes they do consider that this will be a difficult procedure, she also would need to change her anticoagulation to an enoxaparin for 1 week before the procedure which would require injections instead of the oral form.  Please verify what specific questions she has.  Patient is a Thai speaker

## 2023-10-23 NOTE — TELEPHONE ENCOUNTER
Dr. Kurtz Pt stated that she was inform that they have to remove the stent because the stent is blocked and put a new one. Per pt she was told the procedure can take up to 4-5 hrs. Pt stated that she is nervous about the procedure. Pt then wanted to know why the procedure was going to take 4-5 hrs. I inform pt she will have to call Dr. Bone office if she has questions about the procedure so they can explain to her the whole process. Pt verbalized understanding. Pt then wanted to know if she can get a refill for her cholesterol medication. Pt was inform that she has refill on file to call her pharmacy.

## 2023-10-24 DIAGNOSIS — M51.36 DEGENERATIVE DISC DISEASE, LUMBAR: ICD-10-CM

## 2023-10-25 ENCOUNTER — HOSPITAL ENCOUNTER (OUTPATIENT)
Dept: GENERAL RADIOLOGY | Age: 53
Discharge: HOME | End: 2023-10-25
Attending: ORTHOPAEDIC SURGERY

## 2023-10-25 ENCOUNTER — OFFICE VISIT (OUTPATIENT)
Dept: ORTHOPEDICS CLINIC | Facility: CLINIC | Age: 53
End: 2023-10-25

## 2023-10-25 DIAGNOSIS — M62.461 CONTRACTURE OF MUSCLE OF BOTH LOWER LEGS: ICD-10-CM

## 2023-10-25 DIAGNOSIS — M17.0 PRIMARY OSTEOARTHRITIS OF BOTH KNEES: ICD-10-CM

## 2023-10-25 DIAGNOSIS — M62.462 CONTRACTURE OF MUSCLE OF BOTH LOWER LEGS: ICD-10-CM

## 2023-10-25 DIAGNOSIS — M19.079 ANKLE ARTHRITIS: Primary | ICD-10-CM

## 2023-10-25 DIAGNOSIS — M19.079 ANKLE ARTHRITIS: ICD-10-CM

## 2023-10-25 PROCEDURE — 73610 X-RAY EXAM OF ANKLE: CPT | Performed by: ORTHOPAEDIC SURGERY

## 2023-10-25 PROCEDURE — 73564 X-RAY EXAM KNEE 4 OR MORE: CPT | Performed by: ORTHOPAEDIC SURGERY

## 2023-10-25 PROCEDURE — 99204 OFFICE O/P NEW MOD 45 MIN: CPT | Performed by: ORTHOPAEDIC SURGERY

## 2023-10-25 RX ORDER — GABAPENTIN 100 MG/1
CAPSULE ORAL
Qty: 87 CAPSULE | Refills: 0 | OUTPATIENT
Start: 2023-10-25 | End: 2023-11-23

## 2023-10-25 RX ORDER — ALBUTEROL SULFATE 90 UG/1
2 AEROSOL, METERED RESPIRATORY (INHALATION) EVERY 4 HOURS PRN
COMMUNITY
Start: 2022-08-19
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
## 2023-10-31 ENCOUNTER — HOSPITAL ENCOUNTER (OUTPATIENT)
Dept: MRI IMAGING | Facility: HOSPITAL | Age: 53
Discharge: HOME | End: 2023-10-31
Attending: FAMILY MEDICINE
Payer: MEDICAID

## 2023-10-31 DIAGNOSIS — G89.29 CHRONIC ABDOMINAL PAIN: ICD-10-CM

## 2023-10-31 DIAGNOSIS — R10.9 CHRONIC ABDOMINAL PAIN: ICD-10-CM

## 2023-10-31 PROCEDURE — 74183 MRI ABD W/O CNTR FLWD CNTR: CPT | Performed by: FAMILY MEDICINE

## 2023-10-31 PROCEDURE — 72197 MRI PELVIS W/O & W/DYE: CPT | Performed by: FAMILY MEDICINE

## 2023-10-31 PROCEDURE — A9575 INJ GADOTERATE MEGLUMI 0.1ML: HCPCS | Performed by: FAMILY MEDICINE

## 2023-10-31 RX ORDER — GADOTERATE MEGLUMINE 376.9 MG/ML
20 INJECTION INTRAVENOUS
Status: COMPLETED | OUTPATIENT
Start: 2023-10-31 | End: 2023-10-31

## 2023-10-31 RX ADMIN — GADOTERATE MEGLUMINE 20 ML: 376.9 INJECTION INTRAVENOUS at 18:17:00

## 2023-11-03 ENCOUNTER — HOSPITAL ENCOUNTER (OUTPATIENT)
Dept: GENERAL RADIOLOGY | Facility: HOSPITAL | Age: 53
Discharge: HOME | End: 2023-11-03
Attending: ORTHOPAEDIC SURGERY
Payer: MEDICAID

## 2023-11-03 PROCEDURE — 73564 X-RAY EXAM KNEE 4 OR MORE: CPT | Performed by: ORTHOPAEDIC SURGERY

## 2023-12-01 DIAGNOSIS — F41.1 GAD (GENERALIZED ANXIETY DISORDER): ICD-10-CM

## 2023-12-03 NOTE — TELEPHONE ENCOUNTER
Please review. Protocol failed / No protocol.     Requested Prescriptions   Pending Prescriptions Disp Refills    BUSPIRONE 5 MG Oral Tab [Pharmacy Med Name: BUSPIRONE HCL 5 MG TABLET] 90 tablet 0     Sig: TAKE 1/2 TO 1 TABLET 3 TIMES A DAY DAILY AS NEEDED       There is no refill protocol information for this order          Recent Outpatient Visits              1 month ago Ankle arthritis    [address] [facility], [address] [place]     Office Visit    2 months ago Abdominal pain, chronic, left lower quadrant    [facility], [address] [facility], [place], [place], APRN    Office Visit    2 months ago IRIS (generalized anxiety disorder)    [facility], [place] Bobby Hernandez, Zuleyma [facility]ock MD    Telemedicine    3 months ago Degenerative disc disease, lumbar    [facility], [place]    Telemedicine    3 months ago [facility], [facility], [place] Adele Whitney MD    Office Visit            Future Appointments         Provider Department Appt Notes    In 4 days [facility] MR RM4 ([address]) [facility] MRI     In 4 days [facility] MR RM4 ([address]) [facility] MRI

## 2023-12-04 RX ORDER — BUSPIRONE HYDROCHLORIDE 5 MG/1
TABLET ORAL
Qty: 90 TABLET | Refills: 0 | Status: SHIPPED | OUTPATIENT
Start: 2023-12-04

## 2023-12-07 ENCOUNTER — HOSPITAL ENCOUNTER (OUTPATIENT)
Dept: MRI IMAGING | Facility: HOSPITAL | Age: 53
Discharge: HOME | End: 2023-12-07
Attending: ORTHOPAEDIC SURGERY
Payer: MEDICAID

## 2023-12-07 DIAGNOSIS — M19.079 ANKLE ARTHRITIS: ICD-10-CM

## 2023-12-07 PROCEDURE — 73721 MRI JNT OF LWR EXTRE W/O DYE: CPT | Performed by: ORTHOPAEDIC SURGERY

## 2023-12-12 ENCOUNTER — APPOINTMENT (OUTPATIENT)
Dept: CT IMAGING | Facility: HOSPITAL | Age: 53
End: 2023-12-12
Attending: EMERGENCY MEDICINE
Payer: MEDICAID

## 2023-12-12 ENCOUNTER — HOSPITAL ENCOUNTER (EMERGENCY)
Facility: HOSPITAL | Age: 53
Discharge: HOME | End: 2023-12-13
Attending: EMERGENCY MEDICINE
Payer: MEDICAID

## 2023-12-12 VITALS
RESPIRATION RATE: 19 BRPM | HEIGHT: 66.14 IN | OXYGEN SATURATION: 99 % | HEART RATE: 53 BPM | SYSTOLIC BLOOD PRESSURE: 153 MMHG | BODY MASS INDEX: 34.55 KG/M2 | TEMPERATURE: 98 F | DIASTOLIC BLOOD PRESSURE: 94 MMHG | WEIGHT: 215 LBS

## 2023-12-12 DIAGNOSIS — Z91.148 NONCOMPLIANCE WITH MEDICATIONS: ICD-10-CM

## 2023-12-12 DIAGNOSIS — I10 HYPERTENSION, UNSPECIFIED TYPE: Primary | ICD-10-CM

## 2023-12-12 LAB
ALBUMIN SERPL-MCNC: 3.7 G/DL (ref 3.4–5)
ALBUMIN/GLOB SERPL: 1 {RATIO} (ref 1–2)
ALP LIVER SERPL-CCNC: 99 U/L
ALT SERPL-CCNC: 39 U/L
ANION GAP SERPL CALC-SCNC: 4 MMOL/L (ref 0–18)
AST SERPL-CCNC: 36 U/L (ref 15–37)
BASOPHILS # BLD AUTO: 0.06 X10(3) UL (ref 0–0.2)
BASOPHILS NFR BLD AUTO: 1 %
BILIRUB SERPL-MCNC: 0.4 MG/DL (ref 0.1–2)
BILIRUB UR QL STRIP.AUTO: NEGATIVE
BUN BLD-MCNC: 8 MG/DL (ref 9–23)
CALCIUM BLD-MCNC: 8.6 MG/DL (ref 8.5–10.1)
CHLORIDE SERPL-SCNC: 108 MMOL/L (ref 98–112)
CLARITY UR REFRACT.AUTO: CLEAR
CO2 SERPL-SCNC: 27 MMOL/L (ref 21–32)
COLOR UR AUTO: COLORLESS
CREAT BLD-MCNC: 0.87 MG/DL
EGFRCR SERPLBLD CKD-EPI 2021: 79 ML/MIN/1.73M2 (ref 60–?)
EOSINOPHIL # BLD AUTO: 0.18 X10(3) UL (ref 0–0.7)
EOSINOPHIL NFR BLD AUTO: 2.9 %
ERYTHROCYTE [DISTWIDTH] IN BLOOD BY AUTOMATED COUNT: 11.9 %
GLOBULIN PLAS-MCNC: 3.6 G/DL (ref 2.8–4.4)
GLUCOSE BLD-MCNC: 107 MG/DL (ref 70–99)
GLUCOSE UR STRIP.AUTO-MCNC: NORMAL MG/DL
HCT VFR BLD AUTO: 39.6 %
HGB BLD-MCNC: 13.4 G/DL
IMM GRANULOCYTES # BLD AUTO: 0.03 X10(3) UL (ref 0–1)
IMM GRANULOCYTES NFR BLD: 0.5 %
KETONES UR STRIP.AUTO-MCNC: NEGATIVE MG/DL
LEUKOCYTE ESTERASE UR QL STRIP.AUTO: NEGATIVE
LYMPHOCYTES # BLD AUTO: 2.02 X10(3) UL (ref 1–4)
LYMPHOCYTES NFR BLD AUTO: 32.2 %
MCH RBC QN AUTO: 29.3 PG (ref 26–34)
MCHC RBC AUTO-ENTMCNC: 33.8 G/DL (ref 31–37)
MCV RBC AUTO: 86.7 FL
MONOCYTES # BLD AUTO: 0.43 X10(3) UL (ref 0.1–1)
MONOCYTES NFR BLD AUTO: 6.8 %
NEUTROPHILS # BLD AUTO: 3.56 X10 (3) UL (ref 1.5–7.7)
NEUTROPHILS # BLD AUTO: 3.56 X10(3) UL (ref 1.5–7.7)
NEUTROPHILS NFR BLD AUTO: 56.6 %
NITRITE UR QL STRIP.AUTO: NEGATIVE
OSMOLALITY SERPL CALC.SUM OF ELEC: 287 MOSM/KG (ref 275–295)
PH UR STRIP.AUTO: 6 [PH] (ref 5–8)
PLATELET # BLD AUTO: 227 10(3)UL (ref 150–450)
POTASSIUM SERPL-SCNC: 3.9 MMOL/L (ref 3.5–5.1)
PROT SERPL-MCNC: 7.3 G/DL (ref 6.4–8.2)
PROT UR STRIP.AUTO-MCNC: NEGATIVE MG/DL
RBC # BLD AUTO: 4.57 X10(6)UL
RBC UR QL AUTO: NEGATIVE
SODIUM SERPL-SCNC: 139 MMOL/L (ref 136–145)
SP GR UR STRIP.AUTO: <1.005 (ref 1–1.03)
UROBILINOGEN UR STRIP.AUTO-MCNC: NORMAL MG/DL
WBC # BLD AUTO: 6.3 X10(3) UL (ref 4–11)

## 2023-12-12 PROCEDURE — 81003 URINALYSIS AUTO W/O SCOPE: CPT | Performed by: EMERGENCY MEDICINE

## 2023-12-12 PROCEDURE — 99285 EMERGENCY DEPT VISIT HI MDM: CPT

## 2023-12-12 PROCEDURE — 36415 COLL VENOUS BLD VENIPUNCTURE: CPT

## 2023-12-12 PROCEDURE — 70450 CT HEAD/BRAIN W/O DYE: CPT | Performed by: EMERGENCY MEDICINE

## 2023-12-12 PROCEDURE — 80053 COMPREHEN METABOLIC PANEL: CPT | Performed by: EMERGENCY MEDICINE

## 2023-12-12 PROCEDURE — 93005 ELECTROCARDIOGRAM TRACING: CPT

## 2023-12-12 PROCEDURE — 93010 ELECTROCARDIOGRAM REPORT: CPT

## 2023-12-12 PROCEDURE — 85025 COMPLETE CBC W/AUTO DIFF WBC: CPT | Performed by: EMERGENCY MEDICINE

## 2023-12-12 RX ORDER — KETOROLAC TROMETHAMINE 15 MG/ML
15 INJECTION, SOLUTION INTRAMUSCULAR; INTRAVENOUS ONCE
Status: DISCONTINUED | OUTPATIENT
Start: 2023-12-12 | End: 2023-12-13

## 2023-12-12 RX ORDER — CLONIDINE HYDROCHLORIDE 0.1 MG/1
0.1 TABLET ORAL ONCE
Status: COMPLETED | OUTPATIENT
Start: 2023-12-12 | End: 2023-12-12

## 2023-12-12 RX ORDER — ACETAMINOPHEN 500 MG
1000 TABLET ORAL ONCE
Status: COMPLETED | OUTPATIENT
Start: 2023-12-12 | End: 2023-12-12

## 2023-12-13 ENCOUNTER — TELEPHONE (OUTPATIENT)
Dept: FAMILY MEDICINE CLINIC | Facility: CLINIC | Age: 53
End: 2023-12-13

## 2023-12-13 DIAGNOSIS — I10 ESSENTIAL HYPERTENSION: ICD-10-CM

## 2023-12-13 LAB
ATRIAL RATE: 60 BPM
P AXIS: 11 DEGREES
P-R INTERVAL: 132 MS
Q-T INTERVAL: 398 MS
QRS DURATION: 80 MS
QTC CALCULATION (BEZET): 398 MS
R AXIS: -8 DEGREES
T AXIS: 53 DEGREES
VENTRICULAR RATE: 60 BPM

## 2023-12-13 RX ORDER — METOPROLOL SUCCINATE 50 MG/1
50 TABLET, EXTENDED RELEASE ORAL DAILY
Qty: 30 TABLET | Refills: 1 | Status: SHIPPED | OUTPATIENT
Start: 2023-12-13

## 2023-12-13 NOTE — TELEPHONE ENCOUNTER
As clarification I did not discontinue her metoprolol, she self discontinued and blood pressure was stable without medication.   As the blood pressure has increased I would recommend for her to start tracking her blood pressure again and resume metoprolol, I have sent refill for her medication  she can be on a wait list for tomorrow but at this time I am not able to accommodate her today or tomorrow,

## 2023-12-13 NOTE — TELEPHONE ENCOUNTER
Dr. Malu Frausto- Monda Dubin patient wanted to be seen for ongoing/slightly worsening left breast discomfort/lump  Refused appointment with Sera Walker PA-C. Became angry and stated would only see you for all appointments or will she will leave the practice. Spoke with patient (name/ confirmed) and informed patient of Dr. Malu Frausto  instructions/recommendations. Patient verbalizes understanding and agrees to the plan of care. Patient states continues to have left breast discomfort and lump. Informed patient to be seen this week by Sera Walker PA-C  Patient became upset and stated she will only see Dr. Toribio Neither or she will leave the practice  Patient scheduled for first available appointment and was strongly advised multiple times to be seen sooner by another provider if any breast changes are noted or pain becomes worse  Patient verbalized understanding and agreed     Future Appointments   Date Time Provider Kendall Villagran   1/15/2024  3:00 PM [facility]     Called patient utilizing [employer]. The 's name is Susan Andre, and their ID # is [id number].

## 2023-12-13 NOTE — TELEPHONE ENCOUNTER
With [employer]  Sylwia Miller ID# [id number]    Verified name and . Patient was seen in emergency room on 23 for hypertension. Patient states she has not been taking her blood pressure medication because it was discontinued by Dr. Herman Rivera due to controlled blood pressures. She states she will [place] [facility] [place]cl[place] her stent. She is asking if she should resume taking Metoprolol 50 mg as previously prescribed by Dr. Herman Rivera. Patient was advised that emergency room follow up appointment recommended to discuss any possible medication adjustments especially with upcoming procedure. Patient states she wants to see Dr. Heramn Rivera only and can only do a video visit today or tomorrow. She is asking that message be sent to Dr. Herman Rivera to ask to be added to th scheduled today or tomorrow for a virtual visit. Please advise and thank you.           Disposition      Discharge       ED/IC AVS (Printed 2023)      Follow-Ups: Schedule an appointment with Rj Rivas MD (Family Medicine)

## 2023-12-13 NOTE — ED INITIAL ASSESSMENT (HPI)
Patient arrives with high blood pressure at home 191/115 1 hour prior to arrival. Patient states she has a bad headache with some nausea. Patient states she took a BP med metoprolol at 1800, which she hasn't taken in a month because her pressures were very good. Patient states her headache started a few days ago and that's when her blood pressure started getting higher.

## 2024-01-02 ENCOUNTER — NURSE TRIAGE (OUTPATIENT)
Dept: FAMILY MEDICINE CLINIC | Facility: CLINIC | Age: 54
End: 2024-01-02

## 2024-01-02 NOTE — TELEPHONE ENCOUNTER
NICOLLE Kurtz  Action Requested: Summary for Provider     []  Critical Lab, Recommendations Needed  [] Need Additional Advice  [x]   FYI    []   Need Orders  [] Need Medications Sent to Pharmacy  []  Other     SUMMARY: Patient reports severe headache, paresthesias/warmth of L arm, and dizziness. Advised ED now--> agreeable. Refer to system/assessment protocol for yes/no answers. [See below for more details]     Reason for call: Headache (/) and Dizziness (/)  Onset:  2 months; 3 weeks increasing in frequency; present now     Reason for Disposition   SEVERE headache, states 'worst headache' of life    Protocols used: Headache-A-OH    Patient called in Japanese she reports she has some severe pain of her head/headache - that has been intermittent, but has increased in frequency--> present now. She states she feels that her head is going to explode, she also reports some dizziness. She has some nausea. Denies any vomiting. L arm paresthesias present/warmth intermittently  --> new onset this morning. She was advised to go to ED now--> agreeable, she will go to [facility]. Patient instructed any new or worsening symptoms [reviewed] seek immediate medical attention--> 911. Patient verbalized understanding. No further questions or concerns at this time.

## 2024-01-08 ENCOUNTER — TELEPHONE (OUTPATIENT)
Dept: FAMILY MEDICINE CLINIC | Facility: CLINIC | Age: 54
End: 2024-01-08

## 2024-01-08 DIAGNOSIS — F41.1 GAD (GENERALIZED ANXIETY DISORDER): ICD-10-CM

## 2024-01-08 DIAGNOSIS — Z12.31 BREAST CANCER SCREENING BY MAMMOGRAM: Primary | ICD-10-CM

## 2024-01-08 NOTE — TELEPHONE ENCOUNTER
busPIRone 5 MG Oral Tab, TAKE 1/2 TO 1 TABLET 3 TIMES A DAY DAILY AS NEEDED (Patient not taking: Reported on 12/12/2023), Disp: 90 tablet, Rfl: 0

## 2024-01-09 ENCOUNTER — TELEPHONE (OUTPATIENT)
Dept: FAMILY MEDICINE CLINIC | Facility: CLINIC | Age: 54
End: 2024-01-09

## 2024-01-09 DIAGNOSIS — R73.03 PREDIABETES: ICD-10-CM

## 2024-01-09 DIAGNOSIS — E55.9 VITAMIN D DEFICIENCY: Primary | ICD-10-CM

## 2024-01-09 RX ORDER — BUSPIRONE HYDROCHLORIDE 5 MG/1
TABLET ORAL
Qty: 90 TABLET | Refills: 0 | Status: SHIPPED | OUTPATIENT
Start: 2024-01-09 | End: 2024-01-15

## 2024-01-09 NOTE — TELEPHONE ENCOUNTER
Patient states that she needs to get a mammogram test done, and that she needs to get another order entered similar to last year's order. Patient states that she is not getting a regular mammogram test done.

## 2024-01-09 NOTE — TELEPHONE ENCOUNTER
Dr. Kurtz, patient is requesting a mammogram order. Previous mammogram was completed 01/25/23. Please advise on order.     BI-RADS CATEGORY:    DIAGNOSTIC CATEGORY 2--BENIGN FINDING:       RECOMMENDATIONS:    ROUTINE MAMMOGRAM AND CLINICAL EVALUATION IN 12 MONTHS.

## 2024-01-09 NOTE — TELEPHONE ENCOUNTER
Patient was left a message to call back. Transfer to triage dept [id number]  Correct order already in chart. Please clarify with patient.

## 2024-01-09 NOTE — TELEPHONE ENCOUNTER
Patient is asking Dr Kurtz to enter labwork for her. She is requesting: cholesterol vit D anemia a general exam to see her vitamins.     Send her a [employer] message when entered.  *With  Henry id# [id number]   RNCC received call today from Kassidy reporting symptoms of GI upset. These symptoms are similar to characteristics to the symptoms she experienced when she first started Metformin, which had resolved with reduced dosing. Now, the symptoms are not going away. She denies any other symptoms, changes in diet, exercise, or medications.     Symptoms present:   [x] Diarrhea: Started day 2 of metformin, when she was taking 2,000mg due  to misunderstanding direction. Noted complete resolution when she started  taking 500mg daily. Reoccured when she increased metformin dose to  500mg BID   [x] Increased Thirst: Denies increased thirst, but reports excessive dry mouth   [x] Nausea: Persistent; unrelated to food. Associated with mild stomach  \"spasms\" and a \"sour stomach\"   [] Urinary Concern: Denies any changes in frequency of urination and  denies changes in urine characteristics (color, smell)       Blood Sugars  Her blood sugars over the last 4 days are as follows:    Blood Sugars AM PM Notes   11/26/19 439  Took 20u NPH   11/26/19 76  Recheck d/t sweating and dizziness   11/25/19 234 325    11/24/19 247 268    11/23/19 237 336 Started Metformin 500mg daily   11/22/19 194 220 Last day taking 500mg BID     7 day average 270     Metformin History  Nolvia has taken various doses of Metformin since her last endocrinology visit. She initially was taking a higher dose than prescribed, as she followed the directions on the bottle. Those directions represented the goal dose, not the starting dose. When she reduced the dose, she noted complete resolution of GI sx she was experiencing and continued on 500mg daily. When she increased the dose again to 500mg BID, the symptoms returned. She reported going back to 500mg, since she didn't tolerate the increase, and the symptoms have persisted despite the change.     Metformin History Dose Taken Side Effects Instruction   Start:   11/1/19-11/5/19 2,000mg  Misunderstood instructions, and  took according to bottle Nausea, \"sour stomach\", diarrhea, abdominal pain Reduce to prescribed 500mg daily; take with food   11/5/19-11/14 500mg Daily  Noted complete resolution of sx. Resent instructions through patient portal for her to follow   11/15-11/21/19 500mg BID Reoccurrence of nausea, diarrhea, \"sour stomach\" Did not tolerate increase; went back to low dose   11/21/19-11/26/19 500mg Daily Very mild improvement in sx. Still reports feeling like she has a sour stomach        Insulin:  Maude reports taking 20mg of NPH daily. This is reduced from the prescribed 30u in the AM because she states she had some lows and reduced dose as prescribed. As for her short acting, she cannot tell me how much she is taking except to say that she is taking it. Of note, Nolvia has bottles she picked up prior to last appointment. The instructions on those bottles do not match her current order. She is unable to confirm how she is taking it, whether according to new instructions or bottle instructions.

## 2024-01-09 NOTE — TELEPHONE ENCOUNTER
Please review; protocol failed/ No protocol     Requested Prescriptions   Pending Prescriptions Disp Refills    busPIRone 5 MG Oral Tab 90 tablet 0     Sig: TAKE 1/2 TO 1 TABLET 3 TIMES A DAY DAILY AS NEEDED       There is no refill protocol information for this order        Future Appointments         Provider Department Appt Notes    In 6 days Penelope Yip MD [facility], [address], [place] left breast pain-refusing to be seen by any other provider    In 1 month Penelope Peoples DO [facility], [address], [place] f/u          Recent Outpatient Visits              2 months ago Ankle arthritis    [facility], [address], [place] Penelope Peoples DO    Office Visit    3 months ago Abdominal pain, chronic, left lower quadrant    [facility] [place] Bray APRN    Office Visit    3 months ago IRIS (generalized anxiety disorder)    [facility], [address], [place] Penelope Yip MD    Telemedicine    4 months ago Degenerative disc disease, lumbar    [facility], [address], [place] Penelope Yip MD    Telemedicine    4 months ago Myalgia    [facility], [address], [place] Penelope Yip MD    Office Visit

## 2024-01-10 NOTE — TELEPHONE ENCOUNTER
Current order is appropriate based on recommendation by radiology,  if she is having new symptoms like new masses growing she will need to be evaluated to clarify exactly where the mammogram will be to be focused on.  Please explain to patient that new symptoms new evaluations.   Order for lab work placed

## 2024-01-10 NOTE — TELEPHONE ENCOUNTER
Dr Kurtz    Patient has appointment on Monday     Here is the attached note from the upcoming  appointment :    left breast pain-refusing to be seen by any other provider      Will this issues be addressed at the appointment OR do we need to schedule something sooner with you?    Please advise and thank you.  Please reply to pool: EM RN TRIAGE        Future Appointments   Date Time Provider Department Center   1/15/2024  3:00 PM Ariza Altahona, Maria D, MD [facility] [place]   2/14/2024  3:30 PM McGann, Maria, DO [facility] [place]

## 2024-01-10 NOTE — TELEPHONE ENCOUNTER
Language line Gisela ID # [id number], Identified patient's name and . Patient is saying that she was told that she needs the \"Special mammogram\" the next time she gets it done.     Rn relayed that radiology was recommending she go back to a normal mammogram and patient insisted  that she needs the one she had last year. Her appointment notes state that she is having breat pain and she said that the \"ball\" in her left breast is getting larger.   Impression   CONCLUSION:  Multiple cysts in the left breast corresponding to palpable lumps.       BI-RADS CATEGORY:    DIAGNOSTIC CATEGORY 2--BENIGN FINDING:       RECOMMENDATIONS:    ROUTINE MAMMOGRAM AND CLINICAL EVALUATION IN 12 MONTHS         Dr. Kurtz, please advise. She is also asking for blood work orders for vitamins and cholesterol.     Future Appointments   Date Time Provider Department Center   1/15/2024  3:00 PM Penelope Yip MD [facility] [place]   2024  3:30 PM Penelope Peoples DO [facility]  [place]

## 2024-01-11 NOTE — TELEPHONE ENCOUNTER
Ok to wait until Monday   Spoke with pharmacy and let them know that we have tried requesting PA in past for brand name for lyrica however insurance has denied previously. Pharmacy stated they will fax a PA request for the generic form (pregablin) to fax# [phone]

## 2024-01-12 ENCOUNTER — APPOINTMENT (OUTPATIENT)
Dept: ULTRASOUND IMAGING | Facility: HOSPITAL | Age: 54
End: 2024-01-12
Attending: EMERGENCY MEDICINE
Payer: MEDICAID

## 2024-01-12 ENCOUNTER — HOSPITAL ENCOUNTER (EMERGENCY)
Facility: HOSPITAL | Age: 54
Discharge: HOME | End: 2024-01-12
Attending: EMERGENCY MEDICINE
Payer: MEDICAID

## 2024-01-12 ENCOUNTER — APPOINTMENT (OUTPATIENT)
Dept: CT IMAGING | Facility: HOSPITAL | Age: 54
End: 2024-01-12
Attending: EMERGENCY MEDICINE
Payer: MEDICAID

## 2024-01-12 ENCOUNTER — NURSE TRIAGE (OUTPATIENT)
Dept: FAMILY MEDICINE CLINIC | Facility: CLINIC | Age: 54
End: 2024-01-12

## 2024-01-12 VITALS
BODY MASS INDEX: 34.55 KG/M2 | SYSTOLIC BLOOD PRESSURE: 168 MMHG | DIASTOLIC BLOOD PRESSURE: 78 MMHG | WEIGHT: 215 LBS | HEIGHT: 66 IN | OXYGEN SATURATION: 98 % | RESPIRATION RATE: 16 BRPM | TEMPERATURE: 98 F | HEART RATE: 51 BPM

## 2024-01-12 DIAGNOSIS — M79.602 PAIN OF LEFT UPPER EXTREMITY: ICD-10-CM

## 2024-01-12 DIAGNOSIS — R10.9 ABDOMINAL PAIN OF UNKNOWN ETIOLOGY: ICD-10-CM

## 2024-01-12 DIAGNOSIS — M79.605 PAIN OF LEFT LOWER EXTREMITY: ICD-10-CM

## 2024-01-12 DIAGNOSIS — R51.9 NONINTRACTABLE HEADACHE, UNSPECIFIED CHRONICITY PATTERN, UNSPECIFIED HEADACHE TYPE: Primary | ICD-10-CM

## 2024-01-12 LAB
ALBUMIN SERPL-MCNC: 4 G/DL (ref 3.4–5)
ALBUMIN/GLOB SERPL: 1.2 {RATIO} (ref 1–2)
ALP LIVER SERPL-CCNC: 93 U/L
ALT SERPL-CCNC: 29 U/L
ANION GAP SERPL CALC-SCNC: 7 MMOL/L (ref 0–18)
AST SERPL-CCNC: 19 U/L (ref 15–37)
BASOPHILS # BLD AUTO: 0.07 X10(3) UL (ref 0–0.2)
BASOPHILS NFR BLD AUTO: 1.2 %
BILIRUB SERPL-MCNC: 0.5 MG/DL (ref 0.1–2)
BUN BLD-MCNC: 14 MG/DL (ref 9–23)
CALCIUM BLD-MCNC: 9.4 MG/DL (ref 8.5–10.1)
CHLORIDE SERPL-SCNC: 107 MMOL/L (ref 98–112)
CO2 SERPL-SCNC: 26 MMOL/L (ref 21–32)
CREAT BLD-MCNC: 0.77 MG/DL
EGFRCR SERPLBLD CKD-EPI 2021: 91 ML/MIN/1.73M2 (ref 60–?)
EOSINOPHIL # BLD AUTO: 0.16 X10(3) UL (ref 0–0.7)
EOSINOPHIL NFR BLD AUTO: 2.7 %
ERYTHROCYTE [DISTWIDTH] IN BLOOD BY AUTOMATED COUNT: 12.5 %
GLOBULIN PLAS-MCNC: 3.4 G/DL (ref 2.8–4.4)
GLUCOSE BLD-MCNC: 99 MG/DL (ref 70–99)
HCT VFR BLD AUTO: 40.3 %
HGB BLD-MCNC: 13.4 G/DL
IMM GRANULOCYTES # BLD AUTO: 0.01 X10(3) UL (ref 0–1)
IMM GRANULOCYTES NFR BLD: 0.2 %
LIPASE SERPL-CCNC: 33 U/L (ref 13–75)
LYMPHOCYTES # BLD AUTO: 1.99 X10(3) UL (ref 1–4)
LYMPHOCYTES NFR BLD AUTO: 33.1 %
MCH RBC QN AUTO: 28.7 PG (ref 26–34)
MCHC RBC AUTO-ENTMCNC: 33.3 G/DL (ref 31–37)
MCV RBC AUTO: 86.3 FL
MONOCYTES # BLD AUTO: 0.49 X10(3) UL (ref 0.1–1)
MONOCYTES NFR BLD AUTO: 8.2 %
NEUTROPHILS # BLD AUTO: 3.29 X10 (3) UL (ref 1.5–7.7)
NEUTROPHILS # BLD AUTO: 3.29 X10(3) UL (ref 1.5–7.7)
NEUTROPHILS NFR BLD AUTO: 54.6 %
OSMOLALITY SERPL CALC.SUM OF ELEC: 291 MOSM/KG (ref 275–295)
PLATELET # BLD AUTO: 214 10(3)UL (ref 150–450)
POTASSIUM SERPL-SCNC: 3.8 MMOL/L (ref 3.5–5.1)
PROT SERPL-MCNC: 7.4 G/DL (ref 6.4–8.2)
RBC # BLD AUTO: 4.67 X10(6)UL
SODIUM SERPL-SCNC: 140 MMOL/L (ref 136–145)
WBC # BLD AUTO: 6 X10(3) UL (ref 4–11)

## 2024-01-12 PROCEDURE — 99284 EMERGENCY DEPT VISIT MOD MDM: CPT

## 2024-01-12 PROCEDURE — 83690 ASSAY OF LIPASE: CPT | Performed by: EMERGENCY MEDICINE

## 2024-01-12 PROCEDURE — 96374 THER/PROPH/DIAG INJ IV PUSH: CPT

## 2024-01-12 PROCEDURE — 93971 EXTREMITY STUDY: CPT | Performed by: EMERGENCY MEDICINE

## 2024-01-12 PROCEDURE — 96375 TX/PRO/DX INJ NEW DRUG ADDON: CPT

## 2024-01-12 PROCEDURE — 99285 EMERGENCY DEPT VISIT HI MDM: CPT

## 2024-01-12 PROCEDURE — 96361 HYDRATE IV INFUSION ADD-ON: CPT

## 2024-01-12 PROCEDURE — 85025 COMPLETE CBC W/AUTO DIFF WBC: CPT | Performed by: EMERGENCY MEDICINE

## 2024-01-12 PROCEDURE — 74176 CT ABD & PELVIS W/O CONTRAST: CPT | Performed by: EMERGENCY MEDICINE

## 2024-01-12 PROCEDURE — 80053 COMPREHEN METABOLIC PANEL: CPT | Performed by: EMERGENCY MEDICINE

## 2024-01-12 PROCEDURE — 70450 CT HEAD/BRAIN W/O DYE: CPT | Performed by: EMERGENCY MEDICINE

## 2024-01-12 RX ORDER — MORPHINE SULFATE 4 MG/ML
2 INJECTION, SOLUTION INTRAMUSCULAR; INTRAVENOUS ONCE
Status: COMPLETED | OUTPATIENT
Start: 2024-01-12 | End: 2024-01-12

## 2024-01-12 RX ORDER — METOCLOPRAMIDE HYDROCHLORIDE 5 MG/ML
10 INJECTION INTRAMUSCULAR; INTRAVENOUS ONCE
Status: COMPLETED | OUTPATIENT
Start: 2024-01-12 | End: 2024-01-12

## 2024-01-12 RX ORDER — DIPHENHYDRAMINE HYDROCHLORIDE 50 MG/ML
25 INJECTION INTRAMUSCULAR; INTRAVENOUS ONCE
Status: COMPLETED | OUTPATIENT
Start: 2024-01-12 | End: 2024-01-12

## 2024-01-12 NOTE — ED INITIAL ASSESSMENT (HPI)
PT states that she is experiencing headache for five days (worse last two days), pressure and pain on the left leg on the area of a surgical incision (hx of unsuccessful thrombi removal on 20/12/2023 in left leg) and feeling \"warm on left arm)

## 2024-01-12 NOTE — TELEPHONE ENCOUNTER
Action Requested: Summary for Provider     []  Critical Lab, Recommendations Needed  [] Need Additional Advice  [x]   FYI    []   Need Orders  [] Need Medications Sent to Pharmacy  []  Other     SUMMARY: Per protocol disposition advised Go to ED/ICC. ER advised by this RN. Patient states she has mammogram tomorrow, will stay and go to ER. Encouraged patient to go today based on symptoms, verbalizes understanding. Patient states she will go to [facility] today.  Patient encouraged to keep appointment Monday as an office visit for physical assessment of breast pain and to follow-up after ER visit, verbalizes understanding.  Future Appointments   Date Time Provider Department Center   1/13/2024  7:20 AM  FREDDIE RM1 [facility]   1/15/2024  3:00 PM Penelope Yip MD [facility] [place]   2/14/2024  3:30 PM [facility] ER advised. Thank you.    Triage RN, 01/13/24 ER follow-up    Reason for call: Acute  Onset: a few weeks ago     Patient states she is having trouble transferring herself & getting around, would like to change Monday appointment to a virtual visit.  She is having severe pain in head and neck  Furniture fell on her about 4 months ago and has had pain since then, Dr. French is aware  Headache is constant, rating pain 6/10 - 7/10  Patient reports this week is the worst week of her headache  She also reports that she had surgery on her artery in her left leg about a month ago. She is now having pain, rating pain 6/10, over left knee/thigh and some mild swelling.  Feels she may have stretched leg out too much, stretched for 20 minutes yesterday    Reason for Disposition   Patient sounds very sick or weak to the triager   Thigh or calf pain in only one leg and present > 1 hour    Protocols used: Headache-A-OH, Leg Pain-A-OH

## 2024-01-13 ENCOUNTER — HOSPITAL ENCOUNTER (OUTPATIENT)
Dept: MAMMOGRAPHY | Facility: HOSPITAL | Age: 54
Discharge: HOME | End: 2024-01-13
Attending: FAMILY MEDICINE
Payer: MEDICAID

## 2024-01-13 DIAGNOSIS — Z12.31 BREAST CANCER SCREENING BY MAMMOGRAM: ICD-10-CM

## 2024-01-13 PROCEDURE — 77067 SCR MAMMO BI INCL CAD: CPT | Performed by: FAMILY MEDICINE

## 2024-01-13 PROCEDURE — 77063 BREAST TOMOSYNTHESIS BI: CPT | Performed by: FAMILY MEDICINE
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
## 2024-01-15 ENCOUNTER — TELEMEDICINE (OUTPATIENT)
Dept: FAMILY MEDICINE CLINIC | Facility: CLINIC | Age: 54
End: 2024-01-15

## 2024-01-15 DIAGNOSIS — F41.1 GAD (GENERALIZED ANXIETY DISORDER): ICD-10-CM

## 2024-01-15 DIAGNOSIS — E66.09 CLASS 1 OBESITY DUE TO EXCESS CALORIES WITH SERIOUS COMORBIDITY AND BODY MASS INDEX (BMI) OF 34.0 TO 34.9 IN ADULT: ICD-10-CM

## 2024-01-15 DIAGNOSIS — R73.03 PREDIABETES: ICD-10-CM

## 2024-01-15 DIAGNOSIS — I10 ESSENTIAL HYPERTENSION: Primary | ICD-10-CM

## 2024-01-15 RX ORDER — TRAZODONE HYDROCHLORIDE 50 MG/1
50 TABLET ORAL NIGHTLY
Qty: 30 TABLET | Refills: 0 | Status: SHIPPED | OUTPATIENT
Start: 2024-01-15
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
## 2024-01-17 ENCOUNTER — TELEPHONE (OUTPATIENT)
Dept: FAMILY MEDICINE CLINIC | Facility: CLINIC | Age: 54
End: 2024-01-17

## 2024-01-17 NOTE — TELEPHONE ENCOUNTER
Patient calling asking if lab orders have been entered per Dr. Carter  Labs advised to patient-advised to fast  Patient verbalized understanding   Patient had no further questions at this time

## 2024-01-19 RX ORDER — ATORVASTATIN CALCIUM 10 MG/1
10 TABLET, FILM COATED ORAL NIGHTLY
Qty: 90 TABLET | Refills: 2 | Status: SHIPPED | OUTPATIENT
Start: 2024-01-19

## 2024-01-19 NOTE — TELEPHONE ENCOUNTER
Refill passed per [facility] protocol.  Requested Prescriptions   Pending Prescriptions Disp Refills    ATORVASTATIN 10 MG Oral Tab [Pharmacy Med Name: ATORVASTATIN 10 MG TABLET] 90 tablet 0     Sig: TAKE 1 TABLET BY MOUTH EVERY DAY AT NIGHT       Cholesterol Medication Protocol Passed - 1/19/2024 12:12 AM        Passed - ALT in past 12 months        Passed - LDL in past 12 months        Passed - Last ALT < 80     Lab Results   Component Value Date    ALT 29 01/12/2024             Passed - Last LDL < 130     Lab Results   Component Value Date     (H) 07/03/2023             Passed - In person appointment or virtual visit in the past 12 mos or appointment in next 3 mos     Recent Outpatient Visits              4 days ago Essential hypertension    [facility] [address], [place] Penelope Yip MD    Telemedicine    2 months ago Ankle arthritis    [facility], [address], [place] Penelope Peoples DO    Office Visit    3 months ago Abdominal pain, chronic, left lower quadrant    [facility] [place] Chetna Bray APRN    Office Visit    3 months ago IRIS (generalized anxiety disorder)    [facility], [address], [place] Penelope Yip MD    Telemedicine    4 months ago Degenerative disc disease, lumbar    [facility], [place] Penelope Yip MD    Telemedicine          Future Appointments         Provider Department Appt Notes    In 3 weeks Penelope Peoples DO [facility], [address], [place] f/u    In 5 months Vinita Dupree APRN [address]                   Future Appointments         Provider Department Appt Notes    In 3 weeks Penelope Peoples DO [facility], [address], [place] f/u    In 5 months Vinita Dupree APRN [address]            Recent Outpatient Visits              4 days ago Essential hypertension    [facility], [address], [place] Penelope Yip MD    Telemedicine    [facility]    [facility]    3 months ago Abdominal pain, chronic, left lower quadrant    [facility], Chetna Nowak APRN    Office Visit    3 months ago IRIS (generalized anxiety disorder)    [facility] [address], [place] Penelope Yip MD    Telemedicine    4 months ago Degenerative disc disease, lumbar    [facility], [place] Penelope Yip MD    Telemedicine

## 2024-01-24 ENCOUNTER — TELEPHONE (OUTPATIENT)
Dept: FAMILY MEDICINE CLINIC | Facility: CLINIC | Age: 54
End: 2024-01-24

## 2024-01-24 NOTE — TELEPHONE ENCOUNTER
Patient calling to inform that the medication to help her sleep is not working, she is asking if there can be another medication.   She also wanted to inform that her appointment for her dietitian is not until July, and would like to know what to do for weight loss until then

## 2024-01-24 NOTE — TELEPHONE ENCOUNTER
Called and informed the patient of the below with help of   ID [id number].     Patient states understanding and agrees to plan.

## 2024-01-24 NOTE — TELEPHONE ENCOUNTER
She may increase dose of trazodone to 2 tablets per night and monitor the response.  Regarding weight management options she is specifically wanting to see dietitian and the only way to see it would be through the weight management program, she can contact her insurance and see if she can go to a different location like with [facility] but all of them are going to have a long wait time

## 2024-01-29 ENCOUNTER — TELEPHONE (OUTPATIENT)
Dept: FAMILY MEDICINE CLINIC | Facility: CLINIC | Age: 54
End: 2024-01-29

## 2024-01-29 NOTE — TELEPHONE ENCOUNTER
With [facility]  [place]   ID # [id number]    Patient calling ( identified name and  ) states she has been taking medication to help her sleep  ( Trazodone )   she cannot sleep the entire night as the medication is making her tongue feel  \"numb \"    Denies any inflammation to her tongue , does not affect her speech or breathing    Patient asking if she can try  Melatonin 5mg instead ??  She has not tried the Melatonin yet    Please advise and thank you.    Best call back number: Melisa  at [phone]          Patient advised we may be calling back with an  .  The number will not be listed as our office, so please answer the call.  Patient agrees to answer the call

## 2024-01-31 NOTE — TELEPHONE ENCOUNTER
She can try melatonin but it is important for her to be aware that the use of the trazodone was not only to assist with the sleep but also to help with her anxiety, she can try to take half tablet and see if that improves the symptoms, but she can also try melatonin, also please let her know that the lower dose of melatonin the more effective the medication tends to be, she can start with 3 to 5 mg only, increasing the dose of melatonin does not produce improving affect

## 2024-02-09 DIAGNOSIS — F41.1 GAD (GENERALIZED ANXIETY DISORDER): ICD-10-CM

## 2024-02-09 NOTE — TELEPHONE ENCOUNTER
traZODone 50 MG Oral Tab, Take 1 tablet (50 mg total) by mouth nightly., Disp: 30 tablet, Rfl: 0

## 2024-02-10 RX ORDER — TRAZODONE HYDROCHLORIDE 50 MG/1
50 TABLET ORAL NIGHTLY
Qty: 30 TABLET | Refills: 0 | Status: SHIPPED | OUTPATIENT
Start: 2024-02-10

## 2024-02-10 NOTE — TELEPHONE ENCOUNTER
Protocol Failed/ No Protocol    Requested Prescriptions   Pending Prescriptions Disp Refills    traZODone 50 MG Oral Tab 30 tablet 0     Sig: Take 1 tablet (50 mg total) by mouth nightly.       There is no refill protocol information for this order          Future Appointments         Provider Department Appt Notes    In 5 days Penelope Peoples DO [facility], [address], [place] f/u    In 4 months Vinita Dupree APRN [address]           Recent Outpatient Visits              3 weeks ago Essential hypertension    [facility], [address], [place] Penelope Yip MD    Telemedicine    3 months ago Ankle arthritis    [facility] [address], [place] Penelope Peoples DO    Office Visit    4 months ago Abdominal pain, chronic, left lower quadrant    [facility], Chetna Nowak APRN    Office Visit    4 months ago IRIS (generalized anxiety disorder)    [facility], [address], [place] Penelope Yip MD    Telemedicine    5 months ago Degenerative disc disease, lumbar    [facility] [address], [place] Penelope Yip MD    Telemedicine

## 2024-02-14 ENCOUNTER — TELEPHONE (OUTPATIENT)
Dept: PODIATRY CLINIC | Facility: CLINIC | Age: 54
End: 2024-02-14

## 2024-02-14 ENCOUNTER — TELEPHONE (OUTPATIENT)
Dept: ORTHOPEDICS CLINIC | Facility: CLINIC | Age: 54
End: 2024-02-14

## 2024-02-14 ENCOUNTER — OFFICE VISIT (OUTPATIENT)
Dept: ORTHOPEDICS CLINIC | Facility: CLINIC | Age: 54
End: 2024-02-14

## 2024-02-14 DIAGNOSIS — M62.462 CONTRACTURE OF MUSCLE OF BOTH LOWER LEGS: ICD-10-CM

## 2024-02-14 DIAGNOSIS — M76.62 ACHILLES TENDINITIS OF LEFT LOWER EXTREMITY: ICD-10-CM

## 2024-02-14 DIAGNOSIS — M17.0 PRIMARY OSTEOARTHRITIS OF BOTH KNEES: Primary | ICD-10-CM

## 2024-02-14 DIAGNOSIS — M19.071 OSTEOARTHRITIS OF RIGHT ANKLE OR FOOT: ICD-10-CM

## 2024-02-14 DIAGNOSIS — M62.461 CONTRACTURE OF MUSCLE OF BOTH LOWER LEGS: ICD-10-CM

## 2024-02-14 DIAGNOSIS — S93.491A SPRAIN OF ANTERIOR TALOFIBULAR LIGAMENT OF RIGHT ANKLE, INITIAL ENCOUNTER: ICD-10-CM

## 2024-02-14 PROCEDURE — 99213 OFFICE O/P EST LOW 20 MIN: CPT | Performed by: ORTHOPAEDIC SURGERY

## 2024-02-14 NOTE — TELEPHONE ENCOUNTER
Pt has an appointment today 2-14-24 at 3:30 pm.  Pt is not feeling well.  Can the appointment be a phone call.  Please call

## 2024-02-14 NOTE — PROGRESS NOTES
#[id number]  Telehealth outside of [facility]  Telehealth Verbal Consent   I conducted a telehealth visit with Melisa Lee today, 02/14/24, which was completed using a telephone. This has been done in good carmen to provide continuity of care in the best interest of the provider-patient relationship, due to the COVID - public health crisis/national emergency where restrictions of face-to-face office visits are ongoing. Every conscious effort was taken to allow for sufficient and adequate time to complete the visit.  The patient was made aware of the limitations of the telehealth visit, including treatment limitations as no physical exam could be performed.  The patient was advised to call 911 or to go to the ER in case there was an emergency.  The patient was also advised of the potential privacy & security concerns related to the telehealth platform.   The patient was made aware of where to find [facility]'s notice of privacy practices, telehealth consent form and other related consent forms and documents.  which are located on the [facility] website. The patient verbally agreed to telehealth consent form, related consents and the risks discussed.    Lastly, the patient confirmed that they were in [place].   Included in this visit, time may have been spent reviewing labs, medications, radiology tests and decision making. Appropriate medical decision-making and tests are ordered as detailed in the plan of care above.  Coding/billing information is submitted for this visit based on complexity of care and/or time spent for the visit.  12 minutes were spent discussing results with the patient.      HPI  Pt has continued pain to her knee and swelling to her knees and ankles. She has a history of an anspiration in the past but the swelling returned. Her maximal pain is on the lateral aspect of her left heel.     Imaging seen and reviewed by me:     MRI R ankle: chronic ATFL sprain with mild subtalar  chondromalacia  MRI L ankle: mild Achilles tendinitis  Xray knee: bone on bone medial compartment bilateral knees.  Subchondral sclerosis and osteophytes present.    Assessment/Plan: 53 year old year old female presenting with bilateral knee OA.  She has right foot subtalar arthritis and chronic ATFL sprain.  On the left she has mild Achilles tendinitis.  We discussed treatment options and I recommended physical therapy for both her knees and her ankles.  We discussed potential future injections to her knee as well as evaluation for possible total knee arthroplasties in the future.   She should continue taking Ibuprofen 600 mg q6 hrs PRN pain.  Patient will follow-up in 8 weeks to assess her progress.    Penelope Peoples, DO  02/14/24

## 2024-02-14 NOTE — TELEPHONE ENCOUNTER
Communicated with MD via Epic chat and she indicated she would be fine with a televisit. Called patient using JOSE Lund. Informed patient that MD agreeable to televisit/virtual appointment.     Patient requested to be called with . I indicated that there is a note on her chart that a  is needed that the doctor can see.

## 2024-02-14 NOTE — TELEPHONE ENCOUNTER
Patient calling stating she has been waiting for Dr call since 3:30 pm but it seems that the appt was scheduled an office visit . Patient would like to rescheduled and also to make sure she doesn't get charge as a no show.  Please advise   Kittitian speaker

## 2024-02-14 NOTE — TELEPHONE ENCOUNTER
Patient worried about not getting call yet and that she will be charged as a no-show. Please advise of time she can expect call or if should reschedule. Sent ALTO CINCO chat as well

## 2024-02-14 NOTE — TELEPHONE ENCOUNTER
Using language line Evita ID# [id number], spoke with patient to inform her that Dr. Peoples will call her around 5 or 5:30pm for her tele-health visit. Patient was happy to hear that because she was told that her appointment was canceled.

## 2024-02-16 ENCOUNTER — TELEPHONE (OUTPATIENT)
Dept: PHYSICAL THERAPY | Facility: HOSPITAL | Age: 54
End: 2024-02-16

## 2024-02-21 ENCOUNTER — TELEPHONE (OUTPATIENT)
Dept: FAMILY MEDICINE CLINIC | Facility: CLINIC | Age: 54
End: 2024-02-21

## 2024-02-21 NOTE — TELEPHONE ENCOUNTER
Per Media and care Everywhere the name of provider with Vascular and Interventional Radiology is:    Juancho Bone MD   [address] [facility] [address]   [place] [address]   [phone] (Work)   [phone] (Fax)     Patient contacted in Croatian and made aware of above contact information, she statse she was able to find info after all. Patient verbalized understanding. No further questions or concerns at this time.

## 2024-02-21 NOTE — TELEPHONE ENCOUNTER
Patient is calling to ask PCP what the name of the surgeon of a surgery she advised PCP she had in [place] regarding a stent and veins.    Please advise.

## 2024-03-11 ENCOUNTER — TELEPHONE (OUTPATIENT)
Dept: FAMILY MEDICINE CLINIC | Facility: CLINIC | Age: 54
End: 2024-03-11

## 2024-03-11 NOTE — TELEPHONE ENCOUNTER
Ama calling from patients dental office. Patient may need dental work, asking if apixaban can be held for 3 days prior? RN advised this medication is not prescribed by our office and would need to check with prescribing physician. Ama verbalized understanding.

## 2024-03-26 ENCOUNTER — OFFICE VISIT (OUTPATIENT)
Dept: PHYSICAL THERAPY | Facility: HOSPITAL | Age: 54
End: 2024-03-26
Attending: ORTHOPAEDIC SURGERY
Payer: MEDICAID

## 2024-03-26 DIAGNOSIS — S93.491A SPRAIN OF ANTERIOR TALOFIBULAR LIGAMENT OF RIGHT ANKLE, INITIAL ENCOUNTER: ICD-10-CM

## 2024-03-26 DIAGNOSIS — M17.0 PRIMARY OSTEOARTHRITIS OF BOTH KNEES: Primary | ICD-10-CM

## 2024-03-26 DIAGNOSIS — M76.62 ACHILLES TENDINITIS OF LEFT LOWER EXTREMITY: ICD-10-CM

## 2024-03-26 PROCEDURE — 97530 THERAPEUTIC ACTIVITIES: CPT

## 2024-03-26 PROCEDURE — 97162 PT EVAL MOD COMPLEX 30 MIN: CPT

## 2024-04-01 ENCOUNTER — TELEPHONE (OUTPATIENT)
Dept: PHYSICAL THERAPY | Facility: HOSPITAL | Age: 54
End: 2024-04-01

## 2024-04-02 ENCOUNTER — APPOINTMENT (OUTPATIENT)
Dept: PHYSICAL THERAPY | Facility: HOSPITAL | Age: 54
End: 2024-04-02
Attending: ORTHOPAEDIC SURGERY

## 2024-04-04 ENCOUNTER — APPOINTMENT (OUTPATIENT)
Dept: PHYSICAL THERAPY | Facility: HOSPITAL | Age: 54
End: 2024-04-04
Attending: ORTHOPAEDIC SURGERY

## 2024-04-09 ENCOUNTER — APPOINTMENT (OUTPATIENT)
Dept: PHYSICAL THERAPY | Facility: HOSPITAL | Age: 54
End: 2024-04-09
Attending: ORTHOPAEDIC SURGERY

## 2024-04-09 ENCOUNTER — TELEPHONE (OUTPATIENT)
Dept: FAMILY MEDICINE CLINIC | Facility: CLINIC | Age: 54
End: 2024-04-09

## 2024-04-09 NOTE — TELEPHONE ENCOUNTER
Patient was never on OZEMPIC.   An appointment is needed for medication discussion and request .   Upcoming appointment is for her annual physical.   Sustain[employer] message sent .       CSS=please assist  for new medication discussion.       Future Appointments   Date Time Provider Department Center   4/17/2024  3:15 PM [facility] MD [facility]

## 2024-04-09 NOTE — TELEPHONE ENCOUNTER
Patient is calling to ask if PCP may prescribe the medication OZEMPIC.   Patient mentions the insurance was not covering it before but will now cover if sent to [facility] in [place]  Please advise.

## 2024-04-11 ENCOUNTER — APPOINTMENT (OUTPATIENT)
Dept: PHYSICAL THERAPY | Facility: HOSPITAL | Age: 54
End: 2024-04-11
Attending: ORTHOPAEDIC SURGERY

## 2024-04-17 ENCOUNTER — OFFICE VISIT (OUTPATIENT)
Dept: FAMILY MEDICINE CLINIC | Facility: CLINIC | Age: 54
End: 2024-04-17

## 2024-04-17 VITALS
HEART RATE: 66 BPM | WEIGHT: 210 LBS | HEIGHT: 66 IN | SYSTOLIC BLOOD PRESSURE: 118 MMHG | RESPIRATION RATE: 17 BRPM | DIASTOLIC BLOOD PRESSURE: 83 MMHG | BODY MASS INDEX: 33.75 KG/M2

## 2024-04-17 DIAGNOSIS — Z23 NEED FOR DIPHTHERIA-TETANUS-PERTUSSIS (TDAP) VACCINE: ICD-10-CM

## 2024-04-17 DIAGNOSIS — E66.09 CLASS 1 OBESITY DUE TO EXCESS CALORIES WITH SERIOUS COMORBIDITY AND BODY MASS INDEX (BMI) OF 34.0 TO 34.9 IN ADULT: ICD-10-CM

## 2024-04-17 DIAGNOSIS — F41.1 GAD (GENERALIZED ANXIETY DISORDER): ICD-10-CM

## 2024-04-17 DIAGNOSIS — I10 ESSENTIAL HYPERTENSION: ICD-10-CM

## 2024-04-17 DIAGNOSIS — R22.0 MASS OF SCALP: Primary | ICD-10-CM

## 2024-04-17 RX ORDER — FUROSEMIDE 20 MG/1
TABLET ORAL
COMMUNITY
Start: 2024-04-15
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
## 2024-04-30 ENCOUNTER — TELEPHONE (OUTPATIENT)
Dept: INTERNAL MEDICINE CLINIC | Facility: CLINIC | Age: 54
End: 2024-04-30

## 2024-04-30 NOTE — TELEPHONE ENCOUNTER
With language line  Azul  ID # [id number] patient states she has medications she needs to add to her chart.     Zinc 50 mg daily  Chrorothyll 10 gtts daily in water  Artemisinin 100 mg daily  Probiotic 100 mg daily  Vitamin D3 08042 units daily    Patient had about 6 more medications to add but then call was disconnected. I am unsure if the above are correct as I was not able to verify with her prior to call ending. Tried to call patient back. Left message to claudine back. Total time on call was almost 30 minutes.

## 2024-05-02 ENCOUNTER — LAB ENCOUNTER (OUTPATIENT)
Dept: LAB | Facility: HOSPITAL | Age: 54
End: 2024-05-02
Attending: FAMILY MEDICINE
Payer: MEDICAID

## 2024-05-02 DIAGNOSIS — R73.03 PREDIABETES: ICD-10-CM

## 2024-05-02 DIAGNOSIS — E55.9 VITAMIN D DEFICIENCY: ICD-10-CM

## 2024-05-02 LAB
CHOLEST SERPL-MCNC: 184 MG/DL (ref ?–200)
EST. AVERAGE GLUCOSE BLD GHB EST-MCNC: 123 MG/DL (ref 68–126)
FASTING PATIENT LIPID ANSWER: YES
HBA1C MFR BLD: 5.9 % (ref ?–5.7)
HDLC SERPL-MCNC: 44 MG/DL (ref 40–59)
LDLC SERPL CALC-MCNC: 111 MG/DL (ref ?–100)
NONHDLC SERPL-MCNC: 140 MG/DL (ref ?–130)
TRIGL SERPL-MCNC: 163 MG/DL (ref 30–149)
VIT D+METAB SERPL-MCNC: 18.6 NG/ML (ref 30–100)
VLDLC SERPL CALC-MCNC: 28 MG/DL (ref 0–30)

## 2024-05-02 PROCEDURE — 36415 COLL VENOUS BLD VENIPUNCTURE: CPT

## 2024-05-02 PROCEDURE — 82306 VITAMIN D 25 HYDROXY: CPT

## 2024-05-02 PROCEDURE — 83036 HEMOGLOBIN GLYCOSYLATED A1C: CPT

## 2024-05-02 PROCEDURE — 80061 LIPID PANEL: CPT

## 2024-05-02 RX ORDER — ZINC OXIDE 13 %
100 CREAM (GRAM) TOPICAL DAILY
Qty: 1 CAPSULE | Status: CANCELLED | COMMUNITY
Start: 2024-05-02

## 2024-05-02 RX ORDER — MULTIVITAMIN WITH IRON
TABLET ORAL
Status: CANCELLED | COMMUNITY
Start: 2024-05-02

## 2024-05-02 RX ORDER — GINSENG 100 MG
CAPSULE ORAL
Status: CANCELLED | COMMUNITY
Start: 2024-05-02

## 2024-05-02 RX ORDER — MELATONIN
Status: CANCELLED | COMMUNITY
Start: 2024-05-02

## 2024-05-02 NOTE — TELEPHONE ENCOUNTER
Patient calling in to add to the list of medication and vitamins she takes.    Furosemide  20 mg  Metoprolol 50 mg  Apixaban 5 mg    Melatonin 3 mg - patient mentions she purchased without prescription    Zinc 50 mg daily  Chrorothyll 10 gtts daily in water  Artemisinin 100 mg daily  Probiotic 100 mg daily  Vitamin D3 92793 units daily    Magnesium 250 mg    Omega 3    Megafood     Anthony brantley,  sounds like but patient not sure; she purchases at [employer] - consumes 2 bottles a day -    Please advise.

## 2024-05-02 NOTE — TELEPHONE ENCOUNTER
Left message to call back. Please transfer to triage. 1st attempt.  Need to ask patient the frequency she takes the medications want to make sure its once a day also for the Vitamin D3 she takes 10,000?  Omega 3 what dose? I have pended some medications.

## 2024-05-03 ENCOUNTER — TELEPHONE (OUTPATIENT)
Dept: FAMILY MEDICINE CLINIC | Facility: CLINIC | Age: 54
End: 2024-05-03

## 2024-05-03 DIAGNOSIS — M76.62 ACHILLES TENDINITIS OF LEFT LOWER EXTREMITY: ICD-10-CM

## 2024-05-03 DIAGNOSIS — S93.491A SPRAIN OF ANTERIOR TALOFIBULAR LIGAMENT OF RIGHT ANKLE, INITIAL ENCOUNTER: ICD-10-CM

## 2024-05-03 DIAGNOSIS — I10 ESSENTIAL HYPERTENSION: ICD-10-CM

## 2024-05-03 DIAGNOSIS — M17.0 PRIMARY OSTEOARTHRITIS OF BOTH KNEES: Primary | ICD-10-CM

## 2024-05-03 DIAGNOSIS — E55.9 VITAMIN D DEFICIENCY: Primary | ICD-10-CM

## 2024-05-03 RX ORDER — MULTIVITAMIN WITH IRON
250 TABLET ORAL DAILY
COMMUNITY

## 2024-05-03 RX ORDER — METOPROLOL SUCCINATE 50 MG/1
50 TABLET, EXTENDED RELEASE ORAL DAILY
Qty: 90 TABLET | Refills: 3 | Status: SHIPPED | OUTPATIENT
Start: 2024-05-03

## 2024-05-03 RX ORDER — GINSENG 100 MG
50 CAPSULE ORAL DAILY
COMMUNITY

## 2024-05-03 RX ORDER — L.ACID/L.CASEI/B.BIF/B.LON/FOS 2B CELL-50
100 CAPSULE ORAL DAILY
COMMUNITY

## 2024-05-03 NOTE — TELEPHONE ENCOUNTER
Medication pended and routed per protocol.     Requested Prescriptions     Pending Prescriptions Disp Refills    metoprolol succinate ER 50 MG Oral Tablet 24 Hr 90 tablet 3     Sig: Take 1 tablet (50 mg total) by mouth daily.     Last Office Visit with PCP: 4/17/2024

## 2024-05-03 NOTE — TELEPHONE ENCOUNTER
Refill passed per [facility] protocol.  Requested Prescriptions   Pending Prescriptions Disp Refills    metoprolol succinate ER 50 MG Oral Tablet 24 Hr 90 tablet 3     Sig: Take 1 tablet (50 mg total) by mouth daily.       Hypertension Medications Protocol Passed - 5/3/2024  9:42 AM        Passed - CMP or BMP in past 12 months        Passed - Last BP reading less than 140/90     BP Readings from Last 1 Encounters:   04/17/24 118/83               Passed - In person appointment or virtual visit in the past 12 mos or appointment in next 3 mos     Recent Outpatient Visits              2 weeks ago Mass of scalp    [facility] [address], [place] MD    Office Visit    1 month ago Primary osteoarthritis of both knees    [facility] Angelica Cadena, PT    Office Visit    2 months ago Primary osteoarthritis of both knees    [facility] [address], [place] Penelope Peoples,     Office Visit    3 months ago Essential hypertension    [facility] [address], [place] MD    Telemedicine    6 months ago Ankle arthritis    [facility] [address], [place] Penelope Peoples, DO    Office Visit          Future Appointments         Provider Department Appt Notes    In 4 days Angelica Cadena, PT [facility] [employer] [facility]- as of 5/1/24   patient will bring in new insurance card    In 1 week Angelica Cadena, PT [facility] [employer] [facility]    In 1 week Angelica Cadena, PT [facility] [employer] [facility]    In 2 weeks Angelica Cadena, PT [facility] [employer] [facility]    In 2 weeks Angelica Cadena, PT [facility] [employer] [facility]    In 3 weeks Angelica Cadena, PT [facility] [employer] [facility]    In 1 month EH MR RM3 (3T WIDE) [facility] MRI per pt, new ins is [facility] #[id number], please re-verify after  5/1/24    In 1 month Angelica Cadena, PT [facility] [employer] [facility]    In 1 month Angelica Cadena, PT [facility] [employer] [facility]    In 1 month Angelica Cadena, PT [facility] [employer] [facility]    In 1 month Angelica Cadena, PT [facility] [employer] [facility]    In 1 month Angelica Cadena, PT [facility] [employer] [facility]    In 1 month Angelica Cadena, PT [facility] [employer] [facility]    In 1 month Vinita Dupree, [facility], [address] [place] [address]     In 1 month Angelica Cadena, PT [facility] [employer] [facility]    In 1 month Angelica Cadena, PT [facility] [employer] [facility]                    Passed - EGFRCR or GFRNAA > 50     GFR Evaluation  EGFRCR: 91 , resulted on 1/12/2024             Future Appointments         Provider Department Appt Notes    In 4 days Angelica Cadena, PT [facility] [employer] [facility]- as of 5/1/24   patient will bring in new insurance card    In 1 week Angelica Cadena, PT [facility] [employer] [facility]    In 1 week Angelica Cadena, PT [facility] [employer] [facility]    In 2 weeks Angelica Cadena, PT [facility] [employer] [facility]    In 2 weeks Angelica Cadena, PT [facility] [employer] [facility]    In 3 weeks Angelica Cadena, PT [facility] [employer] [facility]    In 1 month  MR RM3 (3T WIDE) [facility] MRI per pt, new ins is [facility] #[id number], please re-verify after 5/1/24    In 1 month Angelica Cadena, PT [facility] [employer] [facility]    In 1 month Angelica Cadena, PT [facility] [employer] [facility]    In 1 month Angelica Cadena, PT [facility] [employer] [facility]    In 1 month Angelica Cadena, PT [facility] [employer] [facility]    In 1 month Angelica Cadena, PT [facility] [employer] [facility]    In 1 month  Angelica Cadena, PT [facility] [employer] [facility]    In 1 month Vinita Dupree APRN [facility], [address] [place] [address]     In 1 month Angelica Cadena, PT [facility] [employer] [facility]    In 1 month Angelica Cadena, PT [facility] [employer] [facility]          Recent Outpatient Visits              2 weeks ago Mass of scalp    [facility] [address], [place] Penelope Yip MD    Office Visit    1 month ago Primary osteoarthritis of both knees    [facility] Angelica Cadena, PT    Office Visit    2 months ago Primary osteoarthritis of both knees    [facility] [address], [place] Penelope Peoplse DO    Office Visit    3 months ago Essential hypertension    [facility] [address], [place] Penelope Yip MD    Telemedicine    6 months ago Ankle arthritis    [facility] [address], [place] Penelope Peoples DO    Office Visit

## 2024-05-03 NOTE — TELEPHONE ENCOUNTER
Dr. Kurtz, please advise on referral, pended for review. Thank you.    Spoke to patient (name and  of patient verified).   She reports she is in need of new physical therapy referral

## 2024-05-03 NOTE — TELEPHONE ENCOUNTER
Dr. Kurtz, patient reports taking supplements listed below (added to medication list as well). She also takes Omega 3, melatonin, and drinks two bottles of propel daily. Please advise if safe to take with Eliquis or if any supplements should be discontinued. Thank you.    Spoke to patient with assistance from Hebrew Language Line  Starla ID #[id number] (Name and  of patient verified).  Requesting Metoprolol refill, see refill request telephone encounter 5/3/24.  Requesting physical therapy order, see referral telephone encounter 5/3/24.  Patient reports taking Omega 3 and Melatonin every day day, unsure of dose. Patient advised to call back when she knows dose or bring information to next office visit, verbalizes understanding. Also drinks 2 bottles of propel per day. She would like to ensure supplements are safe to take with Eliquis    Medications/supplement verified or added to medication list:  Apixaban 5 mg once daily  Metoprolol 50 mg once daily  Furosemide  20 mg once daily  Vitamin D3 26139 units daily  Magnesium 250 mg once daily  Zinc 50 mg daily  Chrorophyll 10 gtts daily in water  Artemisinin 100 mg daily  Probiotic 100 mg daily  Megafood once daily    Length of call: 14.5 minutes

## 2024-05-04 RX ORDER — ATORVASTATIN CALCIUM 20 MG/1
20 TABLET, FILM COATED ORAL NIGHTLY
Qty: 90 TABLET | Refills: 0 | Status: SHIPPED | OUTPATIENT
Start: 2024-05-04

## 2024-05-04 NOTE — TELEPHONE ENCOUNTER
She can hold the omega 3, this can prolong the bleeding time, the other supplements should not interfere , PT order signed

## 2024-05-06 NOTE — TELEPHONE ENCOUNTER
Patient contacted.   Informed of message per Dr Kurtz, hold omega 3 and ok to take other supplements in question.   Patient verbalized understanding.

## 2024-05-07 ENCOUNTER — OFFICE VISIT (OUTPATIENT)
Dept: PHYSICAL THERAPY | Facility: HOSPITAL | Age: 54
End: 2024-05-07
Attending: ORTHOPAEDIC SURGERY
Payer: MEDICAID

## 2024-05-07 PROCEDURE — 97140 MANUAL THERAPY 1/> REGIONS: CPT

## 2024-05-07 NOTE — PROGRESS NOTES
Dx: Primary osteoarthritis of both knees (M17.0)  Achilles tendinitis of left lower extremity (M76.62)  Sprain of anterior talofibular ligament of right ankle, initial encounter (S93.744G)   Authorized # of visit: FHP?  Next MD visits: none  Fall Risk: standard Precautions: none    Date: 5/7/24  Tx #: 2    Current Pain Level: not rated today      Subjective: Patient reports she continues to struggle with pain in both knees and her ankles/feet, L>R.  Patient reports she is aware of increased swelling and does wear compression socks when at work.        Objective: Treatment per flow sheet.      Assessment: Patient tolerating initial treatment today well, tolerating GIASTM without pain complaint.  Note significant swelling within the left foot and ankle with thickening of the left achilles tendon.  Bilateral ankle ROM improves to>10 deg both R/L following manual mobilizations.      Plan: Continue per plan of care.      Treatment Flow:   Manual: (x45 min)  Prone GIASTM to: (x25 min total)  - gastroc-soleus complex B  - Rolando's tendon B  STM/DTM to plantar fascia B  ST release over ATFL on L  Manual Gastroc stretch 3x30\" B  Manual soleus stretch 3x30\" B  Manual grade 3-4 posterior talar glide B, multiple bouts      Charges: 3 MAN Total Timed Treatment: 45 minutes  Total Treatment Time: 45 minutes

## 2024-05-14 ENCOUNTER — OFFICE VISIT (OUTPATIENT)
Dept: PHYSICAL THERAPY | Facility: HOSPITAL | Age: 54
End: 2024-05-14
Attending: ORTHOPAEDIC SURGERY
Payer: MEDICAID

## 2024-05-14 PROCEDURE — 97140 MANUAL THERAPY 1/> REGIONS: CPT

## 2024-05-14 NOTE — TELEPHONE ENCOUNTER
Mammo in jan 2023 states the following:  RECOMMENDATIONS:    ROUTINE MAMMOGRAM AND CLINICAL EVALUATION IN 12 MONTHS.     Per last recommendation, normal mammo is appropriate. Does patient need a diagnostic? Is she having pain? Please discuss with patient.     Pt with Cough was worsening yesterday Was seen recently with similar complaint.     Pt had discharged home with inhaler. Per mom pt having difficulty sleeping due to cough.    Pt had albuterol at 2am     Pt in NAD lungs CTAB

## 2024-05-14 NOTE — PROGRESS NOTES
Dx: Primary osteoarthritis of both knees (M17.0)  Achilles tendinitis of left lower extremity (M76.62)  Sprain of anterior talofibular ligament of right ankle, initial encounter (S93.491A)   Authorized # of visit: 6 visits (5/7-7/6 [facility])  Next MD visits: none  Fall Risk: standard Precautions: none    Date: 5/4/24  Tx #: 3    Current Pain Level: 6/10      Subjective: Patient reports her left ankle continues to be the most bothersome pain, stating the pain is worst around the lateral malleolus. C/o continued knee pain bilaterally.        Objective: Treatment per flow sheet.  Significant swelling still present throughout the L ankle and lower leg today compared to R but swelling over the ATFL is down slightly.      Assessment:     Plan: Assess for response to trial of KT taping today to the L ankle next session.  Issue HEP focusing on hip and LE stretching for B knee pain next session.      Treatment Flow:   Manual: (x30 min)  Prone GIASTM/STM to: (x10 min total)  - gastroc-soleus complex B  - Rolando's tendon B  STM/DTM to plantar fascia L  ST release over ATFL on L  Manual Gastroc stretch 3x30\" B  Manual soleus stretch 3x30\" B  KT taping to L ankle      Charges: 2 MAN Total Timed Treatment: 30 minutes (patient had to leave appt 15 min early today due to scheduling conflict)  Total Treatment Time: 30 minutes

## 2024-05-16 ENCOUNTER — OFFICE VISIT (OUTPATIENT)
Dept: PHYSICAL THERAPY | Facility: HOSPITAL | Age: 54
End: 2024-05-16
Attending: ORTHOPAEDIC SURGERY
Payer: MEDICAID

## 2024-05-16 PROCEDURE — 97110 THERAPEUTIC EXERCISES: CPT

## 2024-05-16 PROCEDURE — 97140 MANUAL THERAPY 1/> REGIONS: CPT

## 2024-05-17 ENCOUNTER — TELEPHONE (OUTPATIENT)
Dept: FAMILY MEDICINE CLINIC | Facility: CLINIC | Age: 54
End: 2024-05-17

## 2024-05-17 NOTE — TELEPHONE ENCOUNTER
Patient would like the order for her brain MRI to be faxed to [facility]. Her insurance will change at the end of the month.    Fax- [phone]    Please call the patient once it has been faxed.

## 2024-05-17 NOTE — PROGRESS NOTES
Dx: Primary osteoarthritis of both knees (M17.0)  Achilles tendinitis of left lower extremity (M76.62)  Sprain of anterior talofibular ligament of right ankle, initial encounter (S93.494S)   Authorized # of visit: 6 visits (5/7-7/6 [facility])  Next MD visits: none  Fall Risk: standard Precautions: none    Date: 5/16/24  Tx #: 4    Current Pain Level: 6/10      Subjective: Patient reports the taping of her ankle has helped a bit, stating the pain is still there but not as intense.  States both her knees have been quite painful the past several days.  Patient states she received notice that she would no longer qualify for [facility] [place] [facility] insurance and states her insurance coverage will be ending the end of this month.      Objective: Treatment per flow sheet.      Assessment: Issued HEP today consisting of self stretching including: hamstring stretch, EOB hip flexor/quad stretch with strap, ITB stretch with strap, adductor stretch, piriformis stretch, gastroc stretch on stair, and soleus stretch on stair.  Patient tolerates all stretching without c/o pain but does note significant tightness.  Thickening of the L Rolando's tendon is diminished today but still thick compared to the R.      Plan: Continue per plan of care.      Treatment Flow:   Therex: (12  min)  90/90 HSS x30\" B  EOB hip flexor/quad stretch w/ strap x30\" B  ITB stretch w/ strap x30\" B  Review of: adductor stretch, seated piriformis stretch, gastroc and soleus stretch on stair  Manual: (26 min)  Hip flexor/quad foam roll stretching by PT x3' each R/L  ITB foam roll in sdly by PT x3' each R/L  Prone STM/foam roll to gastroc-soleus complex x3' each R/L  L ankle grade 3-4 posterior talar glides, multiple bouts      Charges: 2 MAN, ALICJA Total Timed Treatment: 38 minutes   Total Treatment Time: 38 minutes

## 2024-05-21 ENCOUNTER — TELEPHONE (OUTPATIENT)
Dept: FAMILY MEDICINE CLINIC | Facility: CLINIC | Age: 54
End: 2024-05-21

## 2024-05-21 ENCOUNTER — OFFICE VISIT (OUTPATIENT)
Dept: PHYSICAL THERAPY | Facility: HOSPITAL | Age: 54
End: 2024-05-21
Attending: ORTHOPAEDIC SURGERY
Payer: MEDICAID

## 2024-05-21 ENCOUNTER — NURSE TRIAGE (OUTPATIENT)
Dept: FAMILY MEDICINE CLINIC | Facility: CLINIC | Age: 54
End: 2024-05-21

## 2024-05-21 DIAGNOSIS — I10 ESSENTIAL HYPERTENSION: ICD-10-CM

## 2024-05-21 PROCEDURE — 97140 MANUAL THERAPY 1/> REGIONS: CPT

## 2024-05-21 PROCEDURE — 97110 THERAPEUTIC EXERCISES: CPT

## 2024-05-21 RX ORDER — METOPROLOL SUCCINATE 50 MG/1
50 TABLET, EXTENDED RELEASE ORAL DAILY
Qty: 90 TABLET | Refills: 3 | Status: SHIPPED | OUTPATIENT
Start: 2024-05-21

## 2024-05-21 NOTE — TELEPHONE ENCOUNTER
Called patient  at [phone]  with [employer]   Laura  ID# [id number]        Identified patient's name and date of birth     Patient informed Medication was sent to [facility] on 5/2/24    Patient states she does not use [facility] or [facility], pharmacy list updated     RX sent to [facility] as requested

## 2024-05-21 NOTE — TELEPHONE ENCOUNTER
Office visit was not coded annual. Patient believed she completed this on 4/17/24 please advise     Visit Diagnoses  Mass of scalp R22.0    Essential hypertension I10    Need for diphtheria-tetanus-pertussis (Tdap) vaccine Z23    IRIS (generalized anxiety disorder) F41.1    Class 1 obesity due to excess calories with serious comorbidity and body mass index (BMI) of 34.0 to 34.9 in adult E66.09, Z68.34

## 2024-05-21 NOTE — TELEPHONE ENCOUNTER
Patient called and said pharmacy has not received script for medication below.       [id number] -   [place], [place] - [address]       Medication Detail    Medication Quantity Refills Start End   metoprolol succinate ER 50 MG Oral Tablet 24 Hr 90 tablet 3 5/3/2024 --   Sig:   Take 1 tablet (50 mg total) by mouth daily.     Route:   Oral     Order #:   [id number]

## 2024-05-21 NOTE — TELEPHONE ENCOUNTER
Patient needs a refill on:    furosemide 20 MG Oral Tab -- -- 4/15/2024 --   Sig:   Take 1 tablet (20 mg total) by mouth daily.       [facility] #[id number] [address], [phone], [phone] [[id number]]

## 2024-05-22 NOTE — TELEPHONE ENCOUNTER
Action Requested: Summary for Provider     []  Critical Lab, Recommendations Needed  [] Need Additional Advice  []   FYI    []   Need Orders  [] Need Medications Sent to Pharmacy  []  Other     SUMMARY: Patient states she has slight burning when she urinates and her urine is bright yellow. States she has lower abdominal pain, however states she has had this for many years.   Denies: discharge, blood in urine, itching    Patient also complains of tinge of blood in stool x1 week. States she has noticed her stools black. She states she has been constipated but states this has been ongoing for years.     Patient was offered many appts, however patient declined all appts offered due to work schedule.   Patient scheduled appt per her request.     Future Appointments   Date Time Provider Department Center   5/23/2024  5:30 PM Angelica Cadena, PT [facility]   5/28/2024  2:40 PM Odell Murphy APRN [facility]   5/28/2024  5:30 PM Angelica Cadena, PT [facility]   6/4/2024  2:00 PM EH MR RM3 (3T WIDE) [facility]   6/4/2024  4:00 PM Angelica Cadena, PT  PHYS  [facility]   6/6/2024  4:00 PM Angelica Cadena, PT [facility]   6/11/2024  4:00 PM TabAngelica casillas, PT [facility]   6/13/2024  4:00 PM Angelica Cadena, PT [facility]   6/18/2024  4:00 PM Angelica Cadena, PT  PHYS  [facility]   6/20/2024  4:00 PM Angelica Cadena, PT [facility]   6/25/2024  3:00 PM Vinita Dupree APRN [facility] [address]   6/25/2024  4:45 PM Angelica Cadena, PT [facility]   6/27/2024  4:00 PM TabAngelica casillsa E, PT [facility]         Reason for call: Urinary Symptoms and Blood In Stool  Onset: Unknow        Reason for Disposition   Age > 50 years    Protocols used: Urination Pain - Female-A-[place]

## 2024-05-22 NOTE — PROGRESS NOTES
Dx: Primary osteoarthritis of both knees (M17.0)  Achilles tendinitis of left lower extremity (M76.62)  Sprain of anterior talofibular ligament of right ankle, initial encounter (S93.496T)   Authorized # of visit: 6 visits (5/7-7/6 [facility])  Next MD visits: none  Fall Risk: standard Precautions: none    Date: 5/21/24  Tx #: 5    Current Pain Level: not rated today      Subjective: Patient reports she started a \"water-pill\" prescribed by her doctor and she is noticing this has helped to reduce the swelling in her ankles and legs.  Notes the pain in the L ankle has been less since last session but both her knees have been quite painful, stating she worked a very long day yesterday.      Objective: Treatment per flow sheet.      Assessment: Patient is demonstrating significantly less swelling throughout the L ankle and lower leg today. Focused treatment today on flexibility and soft tissue mobilization with primary tightness present throughout bilateral hip flexor/quads and ITB.  Patient demonstrating good understanding of HEP issued last session with brief review today.     Plan: Formal reassess next session to request additional authorization for treatment.      Treatment Flow:   Therex: (10  min)  90/90 HSS 2x30\" B  EOB hip flexor/quad stretch 2x30\" B  Adductor stretch 2x30\" B  Manual: (30 min)  Hip flexor/quad foam roll stretching by PT x5' each R/L  ITB foam roll in sdly by PT x5' each R/L  Prone STM/foam roll to gastroc-soleus complex x3' each R/L  L ankle grade 3-4 posterior talar glides, multiple bouts      Charges: 2 MAN, ALICJA Total Timed Treatment: 40 minutes   Total Treatment Time: 40 minutes

## 2024-05-22 NOTE — TELEPHONE ENCOUNTER
It was not a physical, please inform patient that when she comes for a physical the visit will be only preventative, this will not entail any complaints, this has been explained to the patient before as well.  I am also not sure why specifically she is requesting testing for stool testing or urine, if she is having any complaints she should be evaluated, her baseline blood work was done this month, follow-up vitamin D should be completed next month and lipid panel in 3 months

## 2024-05-23 ENCOUNTER — OFFICE VISIT (OUTPATIENT)
Dept: PHYSICAL THERAPY | Facility: HOSPITAL | Age: 54
End: 2024-05-23
Attending: ORTHOPAEDIC SURGERY
Payer: MEDICAID

## 2024-05-23 PROCEDURE — 97110 THERAPEUTIC EXERCISES: CPT

## 2024-05-23 PROCEDURE — 97140 MANUAL THERAPY 1/> REGIONS: CPT

## 2024-05-24 ENCOUNTER — TELEPHONE (OUTPATIENT)
Dept: FAMILY MEDICINE CLINIC | Facility: CLINIC | Age: 54
End: 2024-05-24

## 2024-05-24 RX ORDER — FUROSEMIDE 20 MG/1
20 TABLET ORAL DAILY
Qty: 90 TABLET | Refills: 3 | Status: SHIPPED | OUTPATIENT
Start: 2024-05-24

## 2024-05-24 NOTE — TELEPHONE ENCOUNTER
Refill passed per [facility] protocol.    Medication is listed as patient reported.- last written by Dr. Bhavesh Chun Cardiologist at [facility]    Requested Prescriptions   Pending Prescriptions Disp Refills    furosemide 20 MG Oral Tab 90 tablet 3     Sig: Take 1 tablet (20 mg total) by mouth daily.       Hypertension Medications Protocol Passed - 5/21/2024 11:26 AM        Passed - CMP or BMP in past 12 months        Passed - Last BP reading less than 140/90     BP Readings from Last 1 Encounters:   04/17/24 118/83               Passed - In person appointment or virtual visit in the past 12 mos or appointment in next 3 mos     Recent Outpatient Visits              Yesterday     [facility] Angelica Cadena, PT    Office Visit    3 days ago     [facility] Angelica Cadena, PT    Office Visit    1 week ago     [facility] Angelica Cadena, PT    Office Visit    1 week ago     [facility] Angelica Cadena, PT    Office Visit    2 weeks ago     [facility] Angelica Cadena, PT    Office Visit          Future Appointments         Provider Department Appt Notes    In 4 days Angelica Cadena, PT [facility] Last Auth Visit  6 visits 5/7 to 7/6  [id number]  [employer]    In 1 week EH MR RM3 (3T WIDE) [facility] MRI per pt, [facility] ins is [facility] #[id number], please re-verify after 5/1/24    In 1 week Angelica Cadena, PT [facility] [employer]    In 1 week Angelica Cadena, PT [facility] [employer]    In 2 weeks Angelica Cadena, PT [facility] [employer] [facility]    In 2 weeks Angelica Cadena, PT [facility] [employer] [facility]    In 3 weeks Angelica Cadena, PT [facility] [employer] [facility]    In 3 weeks Angelica Cadena, PT [facility] [employer]    In 1 month Vinita Dupree APRN [facility], [address] [place] [address]     In 1 month Angelica Cadena, PT [facility] [employer] [facility]    In 1 month Angelica Cadena, PT [facility] [employer]                    Passed - EGFRCR or GFRNAA > 50     GFR Evaluation  EGFRCR: 91 , resulted on 1/12/2024             Future Appointments         Provider Department Appt Notes    In 4 days Angelica Cadena, PT [facility] Last Auth Visit  6 visits 5/7 to 7/6  [id number]  [employer]    In 1 week EH MR RM3 (3T WIDE) [facility] per pt, new ins is [facility] #[id number], please re-verify after 5/1/24    In 1 week Angelica Cadena, PT [facility] [employer]    In 1 week Angelica Cadena, PT [facility] [employer]    In 2 weeks Angelica Cadena, PT [facility] [employer] [facility]    In 2 weeks Angelica Cadena, PT [facility] [employer]    In 3 weeks Angelica Cadena, PT [facility] [employer]    In 3 weeks Angelica Cadena, PT [facility] [employer]    In 1 month Vinita Dupree APRN [facility], [address] [place] [address]     In 1 month Angelica Cadena, PT [facility] [employer]    In 1 month Angelica Cadena, PT [facility] [employer]          Recent Outpatient Visits              Yesterday     [facility] Angelica Cadena, PT    Office Visit    3 days ago     [facility] Angelica Cadena, PT    Office Visit    1 week ago     [facility] Angelica Cadena, PT    Office Visit    1 week ago     [facility] Angelica Cadena, PT    Office Visit    2 weeks ago     [facility] Angelica Cadena, PT    Office Visit

## 2024-05-24 NOTE — PROGRESS NOTES
PROGRESS NOTE:     Diagnosis:    Primary osteoarthritis of both knees (M17.0)  Achilles tendinitis of left lower extremity (M76.62)  Sprain of anterior talofibular ligament of right ankle, initial encounter (S93.187X)      Referring Provider: Penelope Peoples  Date of Evaluation:    3/26/2024    Precautions:  None Next MD visit:   none scheduled  Date of Surgery: n/a       PATIENT SUMMARY   Melisa has attended 6 of 6 authorized session for physical therapy. She reports that her ankle pain is diminished, noting she has been experiencing less swelling since starting a \"water pill\" prescribed by her physician and this seems to have helped in addition to PT.  She reports she still has some pain in the left ankle but it is much less.  Patient reports both knees continue to be painful and this has been her more problematic issue recently.  She states the pain is note as severe on days when her activity level is minimal but when she works long hours (patient works as a ), her knees become quite painful.  Patient notes ascending stairs is not as painful as it had been.  She reports increased pain with descending stairs or activities requiring her to squat down.  She also reports increased pain with prolonged walking, stating there are days where she will walk to/from work and this takes her about 20 minutes each way and the knees become quite painful by the end of the walk.     Melisa describes prior level of function independent and very active with prolonged standing and walking, lifting, pushing, and pulling. Pt goals include to reduce her pain and be able to be active without pain.    ASSESSMENT  The patient is demonstrating progress with participation in physical therapy,  demonstrating improved dorsiflexion ROM and increased knee flexion ROM.  Patient is also demonstrating reduced swelling throughout the left ankle, especially surrounding the Rolando's tendon.  Patient continues to demonstrate decreased soft  tissue mobility/flexibility and will benefit from additional physical therapy in order to reduce muscle tension and compressive force at the knees in addition to promoting improved LE mechanics with activities to reduce knee pain with activities such as squatting and stepping.    OBJECTIVE:   Observation: Relatively neutral foot alignment in double and single limb stance. Patient has well developed musculature throughout the lower extremities.  Note \"spider veins\" throughout the lower extremities.  Note significant reduction in swelling throughout lower leg, ankle, and foot B  Palpation: Decreased soft tissue mobility present throughout gastroc-soleus complex.  Note reduction in the thickening of the L Achilles tendon      AROM: (* denotes performed with pain)  Hip Knee Foot/Ankle   WNL Flexion: R 128; L 125 (was 120*)  Extension: R 0; L 0    DF: R 10; L 10 (was 5)  PF: WNL  INV: WNL  EV: WNL     Accessory motion: improved posterior talar glide mobility on L to WNL    Flexibility:  Hip Flexor: tightness B  Hamstrings: mild tightness B  Piriformis: mild tightness B  Quads: tightness B  ITB: tightness B, R>L  Gastroc-soleus: tightness B    Strength/MMT: (* denotes performed with pain)  Hip Knee Foot/Ankle   Flexion: R 5/5; L 5/5  Extension: R 5/5; L 5/5  Abduction: R 5/5; L 5/5  ER: R 5/5; L 5/5  IR: R 5/5; L 5/5 Flexion: R 5/5; L 5/5  Extension: R 5/5; L 5/5    DF: R 5/5; L 5/5  PF: R 5/5; L 5/5  INV: R 5/5; L 5/5  EV: R 5/5; L 5/5       Special tests:   Unremarkable  LEFS (5/23/24): 57.5    Gait: pt ambulates on level ground with normal mechanics (note tendency to walk with decreased knee flexion in terminal stance/swing phase and decreased toe off with initial gait after sitting prior to treatment but normalized gait mechanics by the end of tx session)      Today’s Treatment and Response:   Formal reassessment.  Patellar mobs all planes B.  Knee flexion PROM B.  90/90 HSS 2x30\" B. Piriformis stretch x30\" B.  EOB  hip flexor/quad stretch 2x30\" B. Hip flexor/quad foam roll release x3' each B. ITB foam roll release x3' each B.  STM to gastroc-soleus complex B x10' total.      Charges: 2 MAN, ALICJA       Total Treatment Time: 45 min       PLAN OF CARE:    Goals: (to be met in 12 visits)  Patient demonstrating improved understanding of body mechanics and positioning for greater spinal safety and reporting increased tolerance to activities such as lifting, pushing, pulling with pain <4/10. MET  Patient reporting ability to sit >1 hour without onset of low back or posterior hip pain MET  Patient demonstrating improved flexibility/soft tissue mobility throughout the LE musculature, demonstrating DF ROM >10 deg both R/L for decreased [place]'s tendon pain with ADL PROGRESS  Patient reporting ability to tolerate prolonged standing >2 hours with pain <=4/10 for improved tolerance and participation in ADL and work activities PROGRESS  Patient demonstrating independence with a comprehensive HEP PROGRESSING as TOLERATED    Frequency / Duration: Patient will be seen for 2 x/week or a total of 6 additional visits over a 90 day period. Treatment will include: Manual Therapy, Neuromuscular Re-education, Therapeutic Activities, Therapeutic Exercise, and Home Exercise Program instruction    Education or treatment limitation: None  Rehab Potential:good      Patient/Family/Caregiver was advised of these findings, precautions, and treatment options and has agreed to actively participate in planning and for this course of care.    Thank you for your referral. Please co-sign or sign and return this letter via fax as soon as possible to [phone]. If you have any questions, please contact me at Dept: [phone]    Sincerely,  Electronically signed by therapist: Angelica Cadena, PT  Physician's certification required: Yes  I certify the need for these services furnished under this plan of treatment and while under my  care.    X___________________________________________________ Date____________________    Certification From: 5/23/2024  To: 8/23/2024

## 2024-05-24 NOTE — TELEPHONE ENCOUNTER
Patient notified the order is in and to get it 6 weeks from last draw, she verbalized understanding

## 2024-05-28 ENCOUNTER — APPOINTMENT (OUTPATIENT)
Dept: PHYSICAL THERAPY | Facility: HOSPITAL | Age: 54
End: 2024-05-28
Attending: ORTHOPAEDIC SURGERY
Payer: MEDICAID

## 2024-05-28 ENCOUNTER — TELEPHONE (OUTPATIENT)
Dept: PHYSICAL THERAPY | Facility: HOSPITAL | Age: 54
End: 2024-05-28

## 2024-05-30 ENCOUNTER — TELEPHONE (OUTPATIENT)
Dept: FAMILY MEDICINE CLINIC | Facility: CLINIC | Age: 54
End: 2024-05-30

## 2024-05-30 DIAGNOSIS — R22.0 MASS OF SCALP: Primary | ICD-10-CM

## 2024-05-30 NOTE — TELEPHONE ENCOUNTER
Per Ranjith Rose insurance verification department, MRI brain  was not covered by the insurance and will need a peer to peer.     She was informed that there is an appointment for the peer to peer with DR Kurtz this afternoon at 245 PM. Today is the  last day for this, it was denied on 5/24/24.   Informed that will send the message to the clinical office staff and Dr Kurtz, as of this moment, there is no appointment scheduled for today .           Future Appointments   Date Time Provider Department Center   6/4/2024  2:00 PM  MR RM3 (3T WIDE) [facility]   6/4/2024  4:00 PM Angelica Cadena, PT [facility] PHYS [facility]   6/6/2024  4:00 PM Angelica Cadena, PT [facility] PHYS [facility]   6/11/2024  4:00 PM TabAngelica casillas E, PT [facility] PHYS TH [facility]   6/13/2024  4:00 PM TabAngelica casillas, PT [facility] PHYS TH [facility]   6/18/2024  4:00 PM TabAngelica casillas, PT [facility] PHYS TH [facility]   6/20/2024  4:00 PM TabAngelica casillas, PT [facility] PHYS TH [facility]   6/25/2024  3:00 PM Vinita Dupree APRN [facility] [address]   6/25/2024  4:45 PM Angelica Cadena, PT [facility] PHYS [facility]   6/27/2024  4:00 PM TabAngelica casillas, PT [facility] PHYS TH [facility]

## 2024-05-30 NOTE — TELEPHONE ENCOUNTER
Phone number for [id number]    [phone]      Reference -[id number]    Per LUCY at [facility], appointment is for next Tuesday

## 2024-05-31 ENCOUNTER — LAB ENCOUNTER (OUTPATIENT)
Dept: LAB | Facility: HOSPITAL | Age: 54
End: 2024-05-31
Attending: FAMILY MEDICINE
Payer: MEDICAID

## 2024-05-31 DIAGNOSIS — E55.9 VITAMIN D DEFICIENCY: ICD-10-CM

## 2024-05-31 LAB — VIT D+METAB SERPL-MCNC: 46.9 NG/ML (ref 30–100)

## 2024-05-31 PROCEDURE — 36415 COLL VENOUS BLD VENIPUNCTURE: CPT

## 2024-05-31 PROCEDURE — 82306 VITAMIN D 25 HYDROXY: CPT

## 2024-05-31 NOTE — TELEPHONE ENCOUNTER
Patient will need to see specialist to evaluate plan of care for lesion, pt to cancel MRI, will not be covered by insurance, referral for surgical evaluation placed

## 2024-06-03 ENCOUNTER — TELEPHONE (OUTPATIENT)
Dept: PHYSICAL THERAPY | Facility: HOSPITAL | Age: 54
End: 2024-06-03

## 2024-06-03 ENCOUNTER — TELEPHONE (OUTPATIENT)
Dept: FAMILY MEDICINE CLINIC | Facility: CLINIC | Age: 54
End: 2024-06-03

## 2024-06-03 DIAGNOSIS — E78.5 DYSLIPIDEMIA: Primary | ICD-10-CM

## 2024-06-03 NOTE — TELEPHONE ENCOUNTER
Yes, she can continue taking vitamin D at 1000 mg over-the-counter every day.  Lipid panel should be repeated after she completes 6 weeks in the higher dose of the atorvastatin

## 2024-06-03 NOTE — TELEPHONE ENCOUNTER
Dr. Kurtz patient stated that she finished taking the Vit D 50,000.  Patient will like to know if she should take Vit D 1,000 daily or what are your recommendations? Also patient stated that she started a higher dose of cholesterol medication and she will like to know when should she get her cholesterol rechecked. Please advise        Melisa Lee,        He revisado los resultados de los examenes. Los examenes no muestran anomalías significativas. Por favor hazme saber si tienes preguntas.        Sinceramente,     Penelope Mcfarland MD   Written by Penelope Mcfarland MD on 5/31/2024 10:10 AM CDT  Seen by patient Melisa Lee on 6/3/2024 12:15 PM

## 2024-06-03 NOTE — TELEPHONE ENCOUNTER
Left message for patient to call back.   Please see provider note below.    Also sent detailed mychart message informing of message below.

## 2024-06-04 ENCOUNTER — APPOINTMENT (OUTPATIENT)
Dept: PHYSICAL THERAPY | Facility: HOSPITAL | Age: 54
End: 2024-06-04
Attending: ORTHOPAEDIC SURGERY

## 2024-06-05 ENCOUNTER — TELEPHONE (OUTPATIENT)
Dept: PHYSICAL THERAPY | Facility: HOSPITAL | Age: 54
End: 2024-06-05

## 2024-06-06 ENCOUNTER — APPOINTMENT (OUTPATIENT)
Dept: PHYSICAL THERAPY | Facility: HOSPITAL | Age: 54
End: 2024-06-06
Attending: ORTHOPAEDIC SURGERY

## 2024-06-11 ENCOUNTER — APPOINTMENT (OUTPATIENT)
Dept: PHYSICAL THERAPY | Facility: HOSPITAL | Age: 54
End: 2024-06-11
Attending: ORTHOPAEDIC SURGERY

## 2024-06-13 ENCOUNTER — APPOINTMENT (OUTPATIENT)
Dept: PHYSICAL THERAPY | Facility: HOSPITAL | Age: 54
End: 2024-06-13
Attending: ORTHOPAEDIC SURGERY

## 2024-06-13 ENCOUNTER — TELEPHONE (OUTPATIENT)
Dept: FAMILY MEDICINE CLINIC | Facility: CLINIC | Age: 54
End: 2024-06-13

## 2024-06-13 NOTE — TELEPHONE ENCOUNTER
Patient wants to know if she should continue to take Vitamin D 10,000 once a month or take Vitamin D 1 daily. 1000mg. Patient wants to know if she is able to take additional Vitamin D supplements and she also wants to know which strength to take and will need to get directions. Patient also wants to know if she is able to take Instaflex 250mg Ingredients curcama, 95/100 Curcuminodis, 100mg Resbelatrol 100mg Boswellia, Apreflex 40mg  Collagen UC 5mg acid Ihialuronico 5mg black pepper, Genius Supplement Mushrooms - Ingredients are Micelo, reishi, ganoderma, helricium. Brain Body Boost - ingredients are creatina, Taurrina, Beta alanina..    From the patient the following message is a personal message for Dr Kurtz -     Acepte mi mas profundo agradacimiento por la excelente attencion y apoyo bharath mi tratamiento. Fair experiencia y fair enfoque compasivo marcaron yemi diferencia significativa en mi proceso de curacion. Estoy Engenuinamente agradecida por fair calidez y profecionalismo. Azael Dr Kurtz., navjot la bendiga y muchisimas exitos y bendiciones en fair nueva aventura. Azael.

## 2024-06-13 NOTE — TELEPHONE ENCOUNTER
She should continue the current dose of vitamin D of 10,000 once a month as in that dosing level is at goal.  I would not recommend to add any other vitamin D supplementation.    Regarding the other supplements she can take the Insta Flex that should not affect her other medications and can help with joint pain.  Regarding genius supplement there does not seem to be any significant evidence to support the use of that supplement. Regarding the brain Body Boost some of the ingredients in the supplement can increase her blood pressure and heart rate, there is also not significant evidence that would make a difference especially if she is taking it for any brain enhancement

## 2024-06-14 RX ORDER — FOLIC ACID 1 MG/1
1 TABLET ORAL WEEKLY
Qty: 6 CAPSULE | Refills: 1 | Status: SHIPPED | OUTPATIENT
Start: 2024-06-14

## 2024-06-14 NOTE — TELEPHONE ENCOUNTER
Please Review. Protocol Failed; No Protocol   Collected 5/31/2024  8:45 AM          Component  Ref Range & Units 5/31/24  8:45 AM   Vitamin D, 25OH, Total  30.0 - 100.0 ng/mL 46.9        Requested Prescriptions   Pending Prescriptions Disp Refills    VITAMIN D3 1.25 MG (95328 UT) Oral Cap [Pharmacy Med Name: VITAMIN D3 50,000 UNIT CAPSULE] 6 capsule 0     Sig: TAKE 1 CAPSULE BY MOUTH ONE TIME PER WEEK       There is no refill protocol information for this order            Future Appointments         Provider Department Appt Notes    In 4 days Angelica Cadena, PT [facility] 4 visits 5/24 to 7/23  [id number]  [employer] [facility]    In 6 days Angelica Cadena, PT [facility] 4 visits 5/24 to 7/23  [id number]  [employer] [facility]    In 1 week Vinita Dupree APRN [facility], [address] [place] [address]     In 1 week Angelica Cadena, PT [facility] [employer] [facility]    In 1 week Angelica Cadena, PT [facility] [employer] [facility]    In 1 month  MR RM4 (3T WIDE) [facility] 06/03 @ 916 pt aware test is denied. Wants to hold off on cancelling, will contact her provider first GRACY townsend.  per pt, new ins is [facility] #[id number], please re-verify after 5/1/24          Recent Outpatient Visits              3 weeks ago     [facility] Angelica Cadena, PT    Office Visit    3 weeks ago     [facility] Angelica Cadena, PT    Office Visit    4 weeks ago     [facility] Angelica Cadena, PT    Office Visit    1 month ago     [facility] Angelica Cadena, PT    Office Visit    1 month ago     [facility] Angelica Cadena, PT    Office Visit

## 2024-06-18 ENCOUNTER — APPOINTMENT (OUTPATIENT)
Dept: PHYSICAL THERAPY | Facility: HOSPITAL | Age: 54
End: 2024-06-18
Attending: ORTHOPAEDIC SURGERY

## 2024-06-20 ENCOUNTER — APPOINTMENT (OUTPATIENT)
Dept: PHYSICAL THERAPY | Facility: HOSPITAL | Age: 54
End: 2024-06-20
Attending: ORTHOPAEDIC SURGERY

## 2024-06-25 ENCOUNTER — APPOINTMENT (OUTPATIENT)
Dept: PHYSICAL THERAPY | Facility: HOSPITAL | Age: 54
End: 2024-06-25
Attending: ORTHOPAEDIC SURGERY

## 2024-06-27 ENCOUNTER — APPOINTMENT (OUTPATIENT)
Dept: PHYSICAL THERAPY | Facility: HOSPITAL | Age: 54
End: 2024-06-27
Attending: ORTHOPAEDIC SURGERY

## 2024-09-23 ENCOUNTER — TELEPHONE (OUTPATIENT)
Dept: FAMILY MEDICINE CLINIC | Facility: CLINIC | Age: 54
End: 2024-09-23

## 2024-09-23 NOTE — TELEPHONE ENCOUNTER
[place], [place] [id number], [place].  Patient verified name/.  Former patient of Dr Kurtz.  Per Care Everywhere, saw Dr Ry Morgan 2024 and had labs done.  Worried because cholesterol elevated, wanted to know what last lipids were running.  Informed of labs from 2024.  Advised her to follow up with Dr Morgan.  Interpretor dropped from call.  Patient stated it was OK, said thank you and ended the call.

## 2025-07-28 NOTE — ADDENDUM NOTE
Addended by: Kristan Soliman on: 1/5/9268 96:90 PM     Modules accepted: Orders Psychoeducation Group Note    PATIENT'S NAME: Kenzie Jon  MRN:   [id number]  :   1995  ACCT. NUMBER: [id number]  DEVYN Gray

## (undated) DEVICE — KIT ENDO ORCAPOD 160/180/190

## (undated) DEVICE — 60 ML SYRINGE REGULAR TIP: Brand: MONOJECT

## (undated) DEVICE — FORCEP RADIAL JAW 4

## (undated) DEVICE — Device: Brand: DUAL NARE NASAL CANNULAE FEMALE LUER CON 7FT O2 TUBE

## (undated) DEVICE — KIT CLEAN ENDOKIT 1.1OZ GOWNX2

## (undated) NOTE — LETTER
1/5/2024              Melisa Aminata Hal Rosa        [address] [place] [id number]         Estimado/a Melisa,    Le enviamos esta carta para informarle que nuestra oficina cintron intentado ponerse en contacto con usted por teléfono sin éxito. El motivo de la llamada era para informarle sobre un mensaje de fair doctor...   Por favor, comuníquese con nuestra oficina llamando al número ubicado debajo para hablar sobre nimisha fernandez y para realizar los cambios necesarios a fair información de contacto en nuestro sistema. Azael por fair ayuda.    Sinceramente,    Penelope Mcfarland MD  [address] [place] [id number] [phone]        Document electronically generated by:  Joanne BRENNAN RN

## (undated) NOTE — LETTER
Date & Time: 2/1/2023, 1:06 PM  Patient: Lesa Barbosa  Encounter Provider(s):    Mauricio Cotton MD       To Whom It May Concern:    Isabella Solis was seen and treated in our department on 2/1/2023. She should not return to work until 2/2/2023.     If you have any questions or concerns, please do not hesitate to call.        _____________________________  Physician/APC Signature

## (undated) NOTE — LETTER
1/3/2023          To Whom It May Concern:    Melisa Boothe is currently under my medical care. Please excuse Zulema Covert for 1 day, Tuesday, January 3, 2023. If you require additional information please contact our office.     Sincerely,    Lesly Ramesh MD

## (undated) NOTE — LETTER
Date & Time: 4/3/2023, 3:47 PM  Patient: Pascale Woodard      To Whom It May Concern:    Sherman Cough was seen and treated in our department on 4/3/2023. Patient would benefit of pool exercises 3-5 times per week    If you have any questions or concerns, please do not hesitate to call. Eric Grier.  Arnulfo Washington MD

## (undated) NOTE — LETTER
Date: 2023      Patient Name: Sasha Koroma      : 3/15/1970        Thank you for choosing [facility] as your health care provider. Your physician has deemed the following medical service(s) necessary. However, your insurance plan may not pay for all of your health care and costs and may deny payment for this service. The fact that your insurance plan does not pay for an item or service does not mean you should not receive it. The purpose of this form is to help you make an informed decision about whether or not you want to receive this service(s) that may not be paid for by your insurance plan. CPT Code Description     Cost     LEFT knee CSi under ultrasound guidance CPT 99804,    RIGHT baker cyst aspiration and knee CSI under ultrasound guidance CPT 65624,    $4800.00      I understand that the above mentioned service(s) or supply may not be covered by my insurance company.  I agree to be financially responsible for the cost of this service or supply in the event of my insurance denies payment as a non-covered benefit.        ______________________________________________________________________  Signature of Patient or Patient's Representative  Relationship  Date    ______________________________________________________________________  Signature of Witness to signing of form   Printed Name

## (undated) NOTE — LETTER
Date: 2023      Patient Name: Placido Nassar      : 3/15/1970        Thank you for choosing [facility] as your health care provider. Your physician has deemed the following medical service(s) necessary. However, your insurance plan may not pay for all of your health care and costs and may deny payment for this service. The fact that your insurance plan does not pay for an item or service does not mean you should not receive it. The purpose of this form is to help you make an informed decision about whether or not you want to receive this service(s) that may not be paid for by your insurance plan. CPT Code Description     Cost     RIGHT baker cyst aspiration       _________ ______________________________ _____________      _________ ______________________________ _____________      I understand that the above mentioned service(s) or supply may not be covered by my insurance company.  I agree to be financially responsible for the cost of this service or supply in the event of my insurance denies payment as a non-covered benefit.        ______________________________________________________________________  Signature of Patient or Patient's Representative  Relationship  Date    ______________________________________________________________________  Signature of Witness to signing of form   Printed Name

## (undated) NOTE — LETTER
To Whom It May Concern: This certifies that Shelby Saxena was seen in my office today. Please excuse her from work today. Do not hesitate to call with any questions or concerns.         Sincerely,    Joanne Schuler MD  [facility], [address]  [phone]        Document generated by:  Stephanie Barajas MA

## (undated) NOTE — LETTER
To Whom It May Concern: This certifies that Tam Hamilton was seen in my office today for an appointment at 7:40am with Dr. Von Rodas. Do not hesitate to call with any questions or concerns.         Sincerely,    Von Rodas MD  [facility]  [phone]        Document generated by:  John Dallas MA

## (undated) NOTE — LETTER
Date: 2/22/2023    Patient Name: Ender Shultz          To Whom it may concern: The above patient was seen at the [facility] for treatment of a medical condition. This patient should be excused from work today due to an appointment at our office. Sincerely,        Vania Leos DPM

## (undated) NOTE — LETTER
Date: 8/14/2020    Patient Name: Ramsey Jo          To Whom it may concern: This letter has been written at the patient's request. The above patient was seen at the [facility] for treatment of a medical condition.     This patient may sta

## (undated) NOTE — LETTER
Date: 7/8/2020    Patient Name: Duke Alanis          To Whom it may concern: This letter has been written at the patient's request. The above patient was seen at the [facility] for treatment of a medical condition.       The patient may ret

## (undated) NOTE — LETTER
AUTHORIZATION FOR SURGICAL OPERATION OR OTHER PROCEDURE    1. I hereby authorize Dr. Sowmya Norris and the [facility] Office staff assigned to my case to perform the following operation and/or procedure at the [facility]:    RIGHT baker cyst aspiration     2. My physician has explained the nature and purpose of the operation or other procedure, possible alternative methods of treatment, the risks involved, and the possibility of complication to me. I acknowledge that no guarantee has been made as to the result that may be obtained. 3.  I recognize that, during the course of this operation, or other procedure, unforseen conditions may necessitate additional or different procedure than those listed above. I, therefore, further authorize and request that the above named physician, his/her physician assistants or designees perform such procedures as are, in his/her professional opinion, necessary and desirable. 4.  Any tissue or organs removed in the operation or other procedure may be disposed of by and at the discretion of the [facility] Office staff and [facility] AT [facility]. 5.  I understand that in the event of a medical emergency, I will be transported by local paramedics to [facility] or other hospital emergency department. 6.  I certify that I have read and fully understand the above consent to operation and/or other procedure. 7.  I acknowledge that my physician has explained sedation/analgesia administration to me including the risks and benefits. I consent to the administration of sedation/analgesia as may be necessary or desirable in the judgement of my physician. Witness signature: ___________________________________________________ Date:  ______/______/_____                    Time:  ________ A. M.  P.M.        Patient Name:  Nazia Omer  3/15/1970  [id number]       Patient signature:  ___________________________________________________                   Statement of Physician  My signature below affirms that prior to the time of the procedure, I have explained to the patient and/or his/her guardian, the risks and benefits involved in the proposed treatment and any reasonable alternative to the proposed treatment. I have also explained the risks and benefits involved in the refusal of the proposed treatment and have answered the patient's questions.                         Date:  ______/______/_______  Provider                      Signature:  __________________________________________________________       Time:  ___________ A.M    P.M.

## (undated) NOTE — LETTER
4/11/2024          Melisa Lee        [address] [place] [phone]         Dear Melisa,    This letter is to inform you that our office has made several attempts to reach you by phone without success.  We were attempting to contact you by phone regarding prescription request    Please contact our office at the number listed below as soon as you receive this letter to discuss this issue and to make the necessary changes in our system to your contact information.  Thank you for your cooperation.        Sincerely,    Penelope Mcfarland MD  [address] [place] [id number] [phone]        Document electronically generated by:  Esha FAY RN

## (undated) NOTE — LETTER
AUTHORIZATION FOR SURGICAL OPERATION OR OTHER PROCEDURE    1. I hereby authorize Dr. Shari Nicole and the [facility] staff assigned to my case to perform the following operation and/or procedure at the [facility]:    LEFT knee CSi under ultrasound guidance CPT 09035,    RIGHT baker cyst aspiration and knee CSI under ultrasound guidance CPT 07989,      2. My physician has explained the nature and purpose of the operation or other procedure, possible alternative methods of treatment, the risks involved, and the possibility of complication to me. I acknowledge that no guarantee has been made as to the result that may be obtained. 3.  I recognize that, during the course of this operation, or other procedure, unforseen conditions may necessitate additional or different procedure than those listed above. I, therefore, further authorize and request that the above named physician, his/her physician assistants or designees perform such procedures as are, in his/her professional opinion, necessary and desirable. 4.  Any tissue or organs removed in the operation or other procedure may be disposed of by and at the discretion of the [facility] staff and [facility] AT [facility]. 5.  I understand that in the event of a medical emergency, I will be transported by local paramedics to [facility] or other hospital emergency department. 6.  I certify that I have read and fully understand the above consent to operation and/or other procedure. 7.  I acknowledge that my physician has explained sedation/analgesia administration to me including the risks and benefits. I consent to the administration of sedation/analgesia as may be necessary or desirable in the judgement of my physician. Witness signature: ___________________________________________________ Date:  ______/______/_____                    Time:  ________ A. M.  P.M.        Patient Name: Ventura Bo Sheryle Brittle  3/15/1970  [id number]         Patient signature:  ___________________________________________________                  Statement of Physician  My signature below affirms that prior to the time of the procedure, I have explained to the patient and/or his/her guardian, the risks and benefits involved in the proposed treatment and any reasonable alternative to the proposed treatment. I have also explained the risks and benefits involved in the refusal of the proposed treatment and have answered the patient's questions.                         Date:  ______/______/_______  Provider                      Signature:  __________________________________________________________       Time:  ___________ JO OSCAR

## (undated) NOTE — LETTER
7/11/2023              [address]        [facility] [phone]         Dear Marco Rowland,    This letter is to inform you that our office has made several attempts to reach you by phone without success. We were attempting to contact you by phone regarding your illness or problem. Please contact our office at the number listed below as soon as you receive this letter to discuss this issue and to make the necessary changes in our system to your contact information. Thank you for your cooperation. Sincerely,    Rinku Clifford MD  [address] [place] [address] [address] [facility]   [phone]        Document electronically generated by:  Claudia Parks RN